# Patient Record
Sex: MALE | Race: WHITE | NOT HISPANIC OR LATINO | Employment: FULL TIME | URBAN - METROPOLITAN AREA
[De-identification: names, ages, dates, MRNs, and addresses within clinical notes are randomized per-mention and may not be internally consistent; named-entity substitution may affect disease eponyms.]

---

## 2017-12-27 ENCOUNTER — APPOINTMENT (EMERGENCY)
Dept: CT IMAGING | Facility: HOSPITAL | Age: 47
End: 2017-12-27
Payer: COMMERCIAL

## 2017-12-27 ENCOUNTER — HOSPITAL ENCOUNTER (EMERGENCY)
Facility: HOSPITAL | Age: 47
Discharge: HOME | End: 2017-12-27
Admitting: EMERGENCY MEDICINE
Payer: COMMERCIAL

## 2017-12-27 VITALS
OXYGEN SATURATION: 99 % | BODY MASS INDEX: 33.34 KG/M2 | WEIGHT: 220 LBS | DIASTOLIC BLOOD PRESSURE: 83 MMHG | HEIGHT: 68 IN | RESPIRATION RATE: 20 BRPM | SYSTOLIC BLOOD PRESSURE: 145 MMHG | TEMPERATURE: 98.5 F | HEART RATE: 89 BPM

## 2017-12-27 DIAGNOSIS — R10.9 RIGHT FLANK PAIN: Primary | ICD-10-CM

## 2017-12-27 LAB
ALBUMIN SERPL BCP-MCNC: 4.6 G/DL (ref 3.5–5)
ALP SERPL-CCNC: 83 U/L (ref 46–116)
ALT SERPL W P-5'-P-CCNC: 54 U/L (ref 12–78)
ANION GAP SERPL CALCULATED.3IONS-SCNC: 8 MMOL/L (ref 4–13)
AST SERPL W P-5'-P-CCNC: 28 U/L (ref 5–45)
BASOPHILS # BLD AUTO: 0.03 THOUSANDS/ΜL (ref 0–0.1)
BASOPHILS NFR BLD AUTO: 1 % (ref 0–1)
BILIRUB SERPL-MCNC: 0.5 MG/DL (ref 0.2–1)
BUN SERPL-MCNC: 9 MG/DL (ref 5–25)
CALCIUM SERPL-MCNC: 8.9 MG/DL (ref 8.3–10.1)
CHLORIDE SERPL-SCNC: 104 MMOL/L (ref 100–108)
CLARITY, POC: CLEAR
CO2 SERPL-SCNC: 27 MMOL/L (ref 21–32)
COLOR, POC: YELLOW
CREAT SERPL-MCNC: 1.04 MG/DL (ref 0.6–1.3)
EOSINOPHIL # BLD AUTO: 0.03 THOUSAND/ΜL (ref 0–0.61)
EOSINOPHIL NFR BLD AUTO: 1 % (ref 0–6)
ERYTHROCYTE [DISTWIDTH] IN BLOOD BY AUTOMATED COUNT: 12.4 % (ref 11.6–15.1)
EXT BILIRUBIN, UA: NORMAL
EXT BLOOD URINE: NORMAL
EXT GLUCOSE, UA: NORMAL
EXT KETONES: NORMAL
EXT NITRITE, UA: NORMAL
EXT PH, UA: 7.5
EXT PROTEIN, UA: NORMAL
EXT SPECIFIC GRAVITY, UA: 1.01
EXT UROBILINOGEN: 0.2
GFR SERPL CREATININE-BSD FRML MDRD: 86 ML/MIN/1.73SQ M
GLUCOSE SERPL-MCNC: 109 MG/DL (ref 65–140)
HCT VFR BLD AUTO: 47.9 % (ref 36.5–49.3)
HGB BLD-MCNC: 16.8 G/DL (ref 12–17)
LYMPHOCYTES # BLD AUTO: 0.88 THOUSANDS/ΜL (ref 0.6–4.47)
LYMPHOCYTES NFR BLD AUTO: 16 % (ref 14–44)
MCH RBC QN AUTO: 31.2 PG (ref 26.8–34.3)
MCHC RBC AUTO-ENTMCNC: 35.1 G/DL (ref 31.4–37.4)
MCV RBC AUTO: 89 FL (ref 82–98)
MONOCYTES # BLD AUTO: 0.41 THOUSAND/ΜL (ref 0.17–1.22)
MONOCYTES NFR BLD AUTO: 8 % (ref 4–12)
NEUTROPHILS # BLD AUTO: 4.06 THOUSANDS/ΜL (ref 1.85–7.62)
NEUTS SEG NFR BLD AUTO: 74 % (ref 43–75)
PLATELET # BLD AUTO: 199 THOUSANDS/UL (ref 149–390)
PMV BLD AUTO: 9.2 FL (ref 8.9–12.7)
POTASSIUM SERPL-SCNC: 4.1 MMOL/L (ref 3.5–5.3)
PROT SERPL-MCNC: 7.5 G/DL (ref 6.4–8.2)
RBC # BLD AUTO: 5.38 MILLION/UL (ref 3.88–5.62)
SODIUM SERPL-SCNC: 139 MMOL/L (ref 136–145)
WBC # BLD AUTO: 5.41 THOUSAND/UL (ref 4.31–10.16)
WBC # BLD EST: NORMAL 10*3/UL

## 2017-12-27 PROCEDURE — 80053 COMPREHEN METABOLIC PANEL: CPT | Performed by: PHYSICIAN ASSISTANT

## 2017-12-27 PROCEDURE — 36415 COLL VENOUS BLD VENIPUNCTURE: CPT | Performed by: PHYSICIAN ASSISTANT

## 2017-12-27 PROCEDURE — 85025 COMPLETE CBC W/AUTO DIFF WBC: CPT | Performed by: PHYSICIAN ASSISTANT

## 2017-12-27 PROCEDURE — 81002 URINALYSIS NONAUTO W/O SCOPE: CPT | Performed by: PHYSICIAN ASSISTANT

## 2017-12-27 PROCEDURE — 96374 THER/PROPH/DIAG INJ IV PUSH: CPT

## 2017-12-27 PROCEDURE — 74176 CT ABD & PELVIS W/O CONTRAST: CPT

## 2017-12-27 PROCEDURE — 99284 EMERGENCY DEPT VISIT MOD MDM: CPT

## 2017-12-27 RX ORDER — CYCLOBENZAPRINE HCL 10 MG
10 TABLET ORAL 3 TIMES DAILY PRN
Qty: 15 TABLET | Refills: 0 | Status: SHIPPED | OUTPATIENT
Start: 2017-12-27 | End: 2019-12-06 | Stop reason: ALTCHOICE

## 2017-12-27 RX ORDER — NAPROXEN 500 MG/1
500 TABLET ORAL 2 TIMES DAILY WITH MEALS
Qty: 20 TABLET | Refills: 0 | Status: SHIPPED | OUTPATIENT
Start: 2017-12-27 | End: 2019-12-06 | Stop reason: ALTCHOICE

## 2017-12-27 RX ORDER — LISINOPRIL 10 MG/1
TABLET ORAL
COMMUNITY
End: 2019-12-06 | Stop reason: ALTCHOICE

## 2017-12-27 RX ORDER — NAPROXEN 375 MG/1
TABLET ORAL
COMMUNITY
End: 2019-12-06 | Stop reason: ALTCHOICE

## 2017-12-27 RX ORDER — KETOROLAC TROMETHAMINE 30 MG/ML
30 INJECTION, SOLUTION INTRAMUSCULAR; INTRAVENOUS ONCE
Status: COMPLETED | OUTPATIENT
Start: 2017-12-27 | End: 2017-12-27

## 2017-12-27 RX ADMIN — KETOROLAC TROMETHAMINE 30 MG: 30 INJECTION, SOLUTION INTRAMUSCULAR at 11:42

## 2017-12-27 NOTE — DISCHARGE INSTRUCTIONS
Rest, ice for next 2 days, heat for 2 days  Take naprosyn twice a day for pain, flexeril 3 times a day for muscle spasm  Follow up with family doctor if no improvement in 5-7 days  Flank Pain   AMBULATORY CARE:   Flank pain  is felt in the area below your ribcage and above your hip bones, often in the lower back  Your pain may be dull or so severe that you cannot get comfortable  The pain may stay in one area or radiate to another area  It may worsen and lighten in waves  Flank pain is often a sign of problems with your urinary tract, such as a kidney stone or infection  Seek care immediately if:   · You have a fever  · Your heart is fluttering or jumping  · You see blood in your urine  · Your pain radiates into your lower abdomen and genital area  · You have intense pain in your low back next to your spine  · You are much more tired than usual and have no desire to eat  · You have a headache and your muscles jerk  Contact your healthcare provider if:   · You have an upset stomach and are vomiting  · You have to urinate more often, and with urgency  · Your pain worsens or does not improve, and you cannot get comfortable  · You pass a stone when you urinate  · You have questions or concerns about your condition or care  Treatment for flank pain  may include medicine to decrease pain or treat a bacterial infection  Follow up with your healthcare provider as directed:  Write down your questions so you remember to ask them during your visits  © 2017 [facility] Information is for End User's use only and may not be sold, redistributed or otherwise used for commercial purposes  All illustrations and images included in CareNotes® are the copyrighted property of [employer]  or [employer] Freeman  The above information is an  only  It is not intended as medical advice for individual conditions or treatments   Talk to your doctor, nurse or pharmacist before following any medical regimen to see if it is safe and effective for you

## 2017-12-27 NOTE — ED PROVIDER NOTES
History  Chief Complaint   Patient presents with    Flank Pain     To ED with c/o right flank pain with radiation to right groin for 2 weeks  Denies any fever or chills  Denies any radiation to pain to right LE  Patient brings to the ED with right back pain and flank pain that radiates around to RLQ  He has been taking aleve for the pain,but it hasn't been helping  He denies any injury to his back  He states lying down makes the pain better, nothing makes it worse  He denies numbness/tingling  He denies urinary symptoms  Patient does have a history of low back pain, but it normally is on the left  Patient states 3 days ago the pain worsened  History provided by:  Patient  Flank Pain   Pain location:  R flank  Pain quality: aching    Pain radiates to:  RLQ  Pain severity:  Moderate  Onset quality:  Gradual  Duration:  2 weeks  Timing:  Constant  Progression:  Worsening  Chronicity:  New  Context: not recent travel, not sick contacts and not trauma    Relieved by:  Lying down  Worsened by:  Nothing  Ineffective treatments:  NSAIDs  Associated symptoms: no chest pain, no chills, no constipation, no diarrhea, no dysuria, no fever, no hematuria, no nausea and no vomiting        Prior to Admission Medications   Prescriptions Last Dose Informant Patient Reported? Taking?   lisinopril (ZESTRIL) 10 mg tablet   Yes No   Sig: Take by mouth   naproxen (NAPROSYN) 375 mg tablet   Yes No   Sig: Take by mouth      Facility-Administered Medications: None       History reviewed  No pertinent past medical history  History reviewed  No pertinent surgical history  History reviewed  No pertinent family history  I have reviewed and agree with the history as documented  Social History   Substance Use Topics    Smoking status: Current Every Day Smoker    Smokeless tobacco: Never Used    Alcohol use Yes      Comment: social        Review of Systems   Constitutional: Negative for chills and fever  Cardiovascular: Negative for chest pain  Gastrointestinal: Negative for constipation, diarrhea, nausea and vomiting  Genitourinary: Positive for flank pain  Negative for difficulty urinating, dysuria, frequency, hematuria and urgency  Musculoskeletal: Positive for back pain  Negative for neck pain  Skin: Negative for color change and rash  Neurological: Negative for dizziness, weakness and numbness  Psychiatric/Behavioral: Negative for confusion  All other systems reviewed and are negative  Physical Exam  ED Triage Vitals [12/27/17 1121]   Temperature Pulse Respirations Blood Pressure SpO2   98 5 °F (36 9 °C) 104 20 (!) 175/105 98 %      Temp Source Heart Rate Source Patient Position - Orthostatic VS BP Location FiO2 (%)   Tympanic Monitor Sitting Right arm --      Pain Score       Worst Possible Pain           Orthostatic Vital Signs  Vitals:    12/27/17 1121 12/27/17 1411   BP: (!) 175/105 145/83   Pulse: 104 89   Patient Position - Orthostatic VS: Sitting        Physical Exam   Constitutional: He is oriented to person, place, and time  He appears well-developed and well-nourished  He is active and cooperative  He does not appear ill  No distress  HENT:   Head: Normocephalic and atraumatic  Right Ear: Hearing normal    Left Ear: Hearing normal    Nose: Nose normal    Mouth/Throat: Oropharynx is clear and moist and mucous membranes are normal    Eyes: Conjunctivae are normal    Neck: Normal range of motion  Cardiovascular: Normal rate, regular rhythm and normal heart sounds  No murmur heard  Pulmonary/Chest: Effort normal and breath sounds normal  He has no wheezes  He has no rhonchi  He has no rales  Abdominal: Soft  Normal appearance and bowel sounds are normal  There is no tenderness  There is no CVA tenderness  Musculoskeletal:        Thoracic back: Normal         Lumbar back: He exhibits normal range of motion, no tenderness, no bony tenderness and no swelling     Negative SLR on the left, Positive on the right  Neurological: He is alert and oriented to person, place, and time  He has normal strength  No cranial nerve deficit or sensory deficit  Skin: Skin is warm and dry  No rash noted  He is not diaphoretic  No pallor  Psychiatric: He has a normal mood and affect  Nursing note and vitals reviewed  ED Medications  Medications   ketorolac (TORADOL) injection 30 mg (30 mg Intravenous Given 12/27/17 1142)       Diagnostic Studies  Results Reviewed     Procedure Component Value Units Date/Time    Comprehensive metabolic panel [[id number]] Collected:  12/27/17 1141    Lab Status:  Final result Specimen:  Blood from Arm, Right Updated:  12/27/17 1209     Sodium 139 mmol/L      Potassium 4 1 mmol/L      Chloride 104 mmol/L      CO2 27 mmol/L      Anion Gap 8 mmol/L      BUN 9 mg/dL      Creatinine 1 04 mg/dL      Glucose 109 mg/dL      Calcium 8 9 mg/dL      AST 28 U/L      ALT 54 U/L      Alkaline Phosphatase 83 U/L      Total Protein 7 5 g/dL      Albumin 4 6 g/dL      Total Bilirubin 0 50 mg/dL      eGFR 86 ml/min/1 73sq m     Narrative:         National Kidney Disease Education Program recommendations are as follows:  GFR calculation is accurate only with a steady state creatinine  Chronic Kidney disease less than 60 ml/min/1 73 sq  meters  Kidney failure less than 15 ml/min/1 73 sq  meters      CBC and differential [[id number]]  (Normal) Collected:  12/27/17 1141    Lab Status:  Final result Specimen:  Blood from Arm, Right Updated:  12/27/17 1152     WBC 5 41 Thousand/uL      RBC 5 38 Million/uL      Hemoglobin 16 8 g/dL      Hematocrit 47 9 %      MCV 89 fL      MCH 31 2 pg      MCHC 35 1 g/dL      RDW 12 4 %      MPV 9 2 fL      Platelets 666 Thousands/uL      Neutrophils Relative 74 %      Lymphocytes Relative 16 %      Monocytes Relative 8 %      Eosinophils Relative 1 %      Basophils Relative 1 %      Neutrophils Absolute 4 06 Thousands/µL      Lymphocytes Absolute 0 88 Thousands/µL      Monocytes Absolute 0 41 Thousand/µL      Eosinophils Absolute 0 03 Thousand/µL      Basophils Absolute 0 03 Thousands/µL     POCT urinalysis dipstick [[id number]]  (Normal) Resulted:  12/27/17 1120    Lab Status:  Final result Specimen:  Urine Updated:  12/27/17 1147     Color, UA YELLOW     Clarity, UA CLEAR     EXT Glucose, UA NEG     EXT Bilirubin, UA (Ref: Negative) NEG     EXT Ketones, UA (Ref: Negative) NEG     EXT Spec Grav, UA 1 010     EXT Blood, UA (Ref: Negative) TARCE     EXT pH, UA 7 5     EXT Protein, UA (Ref: Negative) NEG     EXT Urobilinogen, UA (Ref: 0 2- 1 0) 0 2     EXT Leukocytes, UA (Ref: Negative) NEG     EXT Nitrite, UA (Ref: Negative) NEG                 CT renal stone study abdomen pelvis without contrast   Final Result by Lois Duran MD (12/27 0454)      No acute inflammatory changes within the abdomen or pelvis  Fatty infiltration of the partially imaged liver  No obstructive uropathy or appendicitis in this patient with right flank pain  Small fat-containing right inguinal hernia without apparent complications  Workstation performed: [id number]                    Procedures  Procedures       Phone Contacts  ED Phone Contact    ED Course  ED Course                                MDM  Number of Diagnoses or Management Options  Right flank pain: new and requires workup  Diagnosis management comments: Patient with right flank pain, will order CT to r/o stone           Amount and/or Complexity of Data Reviewed  Clinical lab tests: ordered and reviewed  Tests in the radiology section of CPT®: ordered and reviewed    Patient Progress  Patient progress: stable    CritCare Time    Disposition  Final diagnoses:   Right flank pain     Time reflects when diagnosis was documented in both MDM as applicable and the Disposition within this note     Time User Action Codes Description Comment    12/27/2017  1:59 PM Faiza Wellington Add [R10 9] Right flank pain       ED Disposition     ED Disposition Condition Comment    Discharge  Kerri Rodriguez discharge to home/self care  Condition at discharge: Stable        Follow-up Information     Follow up With Specialties Details Why Contact Info    Christella Skiff  In 1 week As needed, For recheck [address]  [address] [facility]  Shawn          Discharge Medication List as of 12/27/2017  2:01 PM      START taking these medications    Details   cyclobenzaprine (FLEXERIL) 10 mg tablet Take 1 tablet by mouth 3 (three) times a day as needed for muscle spasms, Starting Wed 12/27/2017, Print      !! naproxen (NAPROSYN) 500 mg tablet Take 1 tablet by mouth 2 (two) times a day with meals, Starting Wed 12/27/2017, Print       !! - Potential duplicate medications found  Please discuss with provider  CONTINUE these medications which have NOT CHANGED    Details   lisinopril (ZESTRIL) 10 mg tablet Take by mouth, Historical Med      !! naproxen (NAPROSYN) 375 mg tablet Take by mouth, Historical Med       !! - Potential duplicate medications found  Please discuss with provider  No discharge procedures on file      ED Provider  Electronically Signed by           Gilberto Beavers PA-C  12/27/17 2950

## 2019-12-06 ENCOUNTER — OFFICE VISIT (OUTPATIENT)
Dept: FAMILY MEDICINE CLINIC | Facility: CLINIC | Age: 49
End: 2019-12-06
Payer: COMMERCIAL

## 2019-12-06 VITALS
BODY MASS INDEX: 39.96 KG/M2 | RESPIRATION RATE: 16 BRPM | WEIGHT: 269.8 LBS | HEART RATE: 84 BPM | DIASTOLIC BLOOD PRESSURE: 100 MMHG | HEIGHT: 69 IN | SYSTOLIC BLOOD PRESSURE: 168 MMHG

## 2019-12-06 DIAGNOSIS — R53.83 FATIGUE, UNSPECIFIED TYPE: ICD-10-CM

## 2019-12-06 DIAGNOSIS — E66.01 MORBID OBESITY WITH BMI OF 40.0-44.9, ADULT (HCC): ICD-10-CM

## 2019-12-06 DIAGNOSIS — I10 ESSENTIAL HYPERTENSION: Primary | ICD-10-CM

## 2019-12-06 DIAGNOSIS — R51.9 NONINTRACTABLE HEADACHE, UNSPECIFIED CHRONICITY PATTERN, UNSPECIFIED HEADACHE TYPE: ICD-10-CM

## 2019-12-06 DIAGNOSIS — F17.200 SMOKING: ICD-10-CM

## 2019-12-06 PROCEDURE — 99203 OFFICE O/P NEW LOW 30 MIN: CPT | Performed by: PHYSICIAN ASSISTANT

## 2019-12-06 PROCEDURE — 93000 ELECTROCARDIOGRAM COMPLETE: CPT | Performed by: PHYSICIAN ASSISTANT

## 2019-12-06 RX ORDER — LISINOPRIL 10 MG/1
10 TABLET ORAL DAILY
Qty: 90 TABLET | Refills: 0 | Status: SHIPPED | OUTPATIENT
Start: 2019-12-06 | End: 2020-02-25

## 2019-12-06 NOTE — PROGRESS NOTES
Assessment/Plan:    1  Essential hypertension    - start Lisinopril once daily, stops NSAIDS, watch diet cut back on salt to less than 2 gr a day,get out and walk 4 x a week 30  Minutes, quit smoking, continue with good sleep 8 hours a night repeat BP in 1 week  - POCT ECG  - Lipid panel; Future  - Comprehensive metabolic panel; Future  - CBC and differential; Future  - TSH, 3rd generation with Free T4 reflex; Future  - UA (URINE) with reflex to Scope  - lisinopril (ZESTRIL) 10 mg tablet; Take 1 tablet (10 mg total) by mouth daily  Dispense: 90 tablet; Refill: 0    2  headache     - normal exam, due to #1, if headache gets worse must go to ER, patient agreeable    3  Morbid obesity    - encouraged patient to work on [employer], exercise  and adequate sleep for weight loss  Follow up if more help is needed    4  Smoking    - quit    F/u as needed  F/u 1 week, Tdap at visit    Subjective:   Chief Complaint   Patient presents with    Establish Care    Headache     Times 3 weeks     Dizziness     while coughing       Patient ID: Bj Hunter is a 54 y o  male  Past three weeks patient has noted a frontal headache  Resolves with sleep, but goes up as that day goes on  As soon as he showers and drinks coffee then gets to work the headache returns  Getting about 8 hours sleep  Taking Advil for headache  Patient gets dizzy only with coughing  Headache worse with bending over  Denies change in vision, memory  Has been checking BP at home 168/100  BP at home a little lower but still high diastolic in 23Q  Systolic consistenly in 225  Eats poorly, a lot of packaged frozen  Minimal vegetable and fruit  Denies exercise  Drinks water all day  Smoker 1/2 pack per day  Does not know parents      Was on lisinopril in the past but stopped because BP was normal       The following portions of the patient's history were reviewed and updated as appropriate: allergies, current medications, past family history, past medical history, past social history, past surgical history and problem list     History reviewed  No pertinent past medical history  Past Surgical History:   Procedure Laterality Date    CARPAL TUNNEL RELEASE Left      Family History   Problem Relation Age of Onset    Cancer Maternal Grandmother     Cancer Maternal Grandfather      Social History     Socioeconomic History    Marital status: Single     Spouse name: Not on file    Number of children: Not on file    Years of education: Not on file    Highest education level: Not on file   Occupational History    Not on file   Social Needs    Financial resource strain: Not on file    Food insecurity:     Worry: Not on file     Inability: Not on file    Transportation needs:     Medical: Not on file     Non-medical: Not on file   Tobacco Use    Smoking status: Current Every Day Smoker     Packs/day: 0 50    Smokeless tobacco: Never Used   Substance and Sexual Activity    Alcohol use: Yes     Comment: Everyday     Drug use: No    Sexual activity: Not on file   Lifestyle    Physical activity:     Days per week: Not on file     Minutes per session: Not on file    Stress: Not on file   Relationships    Social connections:     Talks on phone: Not on file     Gets together: Not on file     Attends [employer] service: Not on file     Active member of club or organization: Not on file     Attends meetings of clubs or organizations: Not on file     Relationship status: Not on file    Intimate partner violence:     Fear of current or ex partner: Not on file     Emotionally abused: Not on file     Physically abused: Not on file     Forced sexual activity: Not on file   Other Topics Concern    Not on file   Social History Narrative    Not on file     No current outpatient medications on file  Review of Systems   Constitutional: Negative  Eyes: Negative  Respiratory: Negative  Cardiovascular: Negative      Neurological: Positive for headaches  Objective:    Vitals:    12/06/19 1207   BP: 168/100   BP Location: Left arm   Patient Position: Sitting   Cuff Size: Adult   Pulse: 84   Resp: 16   Weight: 122 kg (269 lb 12 8 oz)   Height: 5' 8 5" (1 74 m)     BP L arm: 172/94  BP R arm: 168/90     Physical Exam   Constitutional: He is oriented to person, place, and time  He appears well-developed and well-nourished  Neck: Neck supple  Cardiovascular: Normal rate, regular rhythm, normal heart sounds and intact distal pulses  Pulmonary/Chest: Effort normal and breath sounds normal    Musculoskeletal: He exhibits no edema  Neurological: He is alert and oriented to person, place, and time  Skin: Skin is warm  Psychiatric: He has a normal mood and affect  BMI Counseling: Body mass index is 40 43 kg/m²  The BMI is above normal  Nutrition recommendations include reducing portion sizes, decreasing overall calorie intake and 3-5 servings of fruits/vegetables daily  Exercise recommendations include moderate aerobic physical activity for 150 minutes/week

## 2019-12-10 LAB
BASOPHILS # BLD AUTO: 0 X10E3/UL (ref 0–0.2)
BASOPHILS NFR BLD AUTO: 0 %
CHOLEST SERPL-MCNC: 201 MG/DL (ref 100–199)
EOSINOPHIL # BLD AUTO: 0.1 X10E3/UL (ref 0–0.4)
EOSINOPHIL NFR BLD AUTO: 3 %
ERYTHROCYTE [DISTWIDTH] IN BLOOD BY AUTOMATED COUNT: 13.6 % (ref 12.3–15.4)
HCT VFR BLD AUTO: 48.4 % (ref 37.5–51)
HDLC SERPL-MCNC: 37 MG/DL
HGB BLD-MCNC: 16.8 G/DL (ref 13–17.7)
IMM GRANULOCYTES # BLD: 0 X10E3/UL (ref 0–0.1)
IMM GRANULOCYTES NFR BLD: 0 %
LDLC SERPL CALC-MCNC: 109 MG/DL (ref 0–99)
LYMPHOCYTES # BLD AUTO: 1.2 X10E3/UL (ref 0.7–3.1)
LYMPHOCYTES NFR BLD AUTO: 22 %
MCH RBC QN AUTO: 32.3 PG (ref 26.6–33)
MCHC RBC AUTO-ENTMCNC: 34.7 G/DL (ref 31.5–35.7)
MCV RBC AUTO: 93 FL (ref 79–97)
MONOCYTES # BLD AUTO: 0.8 X10E3/UL (ref 0.1–0.9)
MONOCYTES NFR BLD AUTO: 15 %
NEUTROPHILS # BLD AUTO: 3.2 X10E3/UL (ref 1.4–7)
NEUTROPHILS NFR BLD AUTO: 60 %
PLATELET # BLD AUTO: 187 X10E3/UL (ref 150–450)
RBC # BLD AUTO: 5.2 X10E6/UL (ref 4.14–5.8)
SL AMB VLDL CHOLESTEROL CALC: 55 MG/DL (ref 5–40)
TRIGL SERPL-MCNC: 277 MG/DL (ref 0–149)
TSH SERPL DL<=0.005 MIU/L-ACNC: 2.26 UIU/ML (ref 0.45–4.5)
WBC # BLD AUTO: 5.3 X10E3/UL (ref 3.4–10.8)

## 2019-12-16 ENCOUNTER — OFFICE VISIT (OUTPATIENT)
Dept: FAMILY MEDICINE CLINIC | Facility: CLINIC | Age: 49
End: 2019-12-16
Payer: COMMERCIAL

## 2019-12-16 VITALS
HEART RATE: 84 BPM | WEIGHT: 265.2 LBS | RESPIRATION RATE: 16 BRPM | SYSTOLIC BLOOD PRESSURE: 130 MMHG | BODY MASS INDEX: 40.19 KG/M2 | DIASTOLIC BLOOD PRESSURE: 82 MMHG | HEIGHT: 68 IN

## 2019-12-16 DIAGNOSIS — E66.01 MORBID OBESITY WITH BMI OF 40.0-44.9, ADULT (HCC): ICD-10-CM

## 2019-12-16 DIAGNOSIS — F17.200 SMOKING: ICD-10-CM

## 2019-12-16 DIAGNOSIS — I10 ESSENTIAL HYPERTENSION: Primary | ICD-10-CM

## 2019-12-16 PROCEDURE — 3079F DIAST BP 80-89 MM HG: CPT | Performed by: PHYSICIAN ASSISTANT

## 2019-12-16 PROCEDURE — 99213 OFFICE O/P EST LOW 20 MIN: CPT | Performed by: PHYSICIAN ASSISTANT

## 2019-12-16 PROCEDURE — 3075F SYST BP GE 130 - 139MM HG: CPT | Performed by: PHYSICIAN ASSISTANT

## 2019-12-16 PROCEDURE — 3008F BODY MASS INDEX DOCD: CPT | Performed by: PHYSICIAN ASSISTANT

## 2019-12-16 NOTE — PROGRESS NOTES
Assessment/Plan:    1  Essential hypertension    - significant improvement with diet and lisinopril  Continue Lisinopril daily along with diet, work on quitting smoking and starting exercise  Recheck in 2-3 months  Continue with HBP monitoring    2  Smoking    - continue to work on quitting    3  Morbid obesity with BMI of 40 0-44 9, adult ([facility] [address] )  - encouraged patient to work on [employer], exercise  and adequate sleep for weight loss  Follow up if more help is neede    F/u as needed    Subjective:   Chief Complaint   Patient presents with    Hypertension      Patient ID: Aldo Magaña is a 54 y o  male  Patient here for follow up regarding BP  Patients headache completely gone  Threw out all packaged food,  Eating fruits, veggies and drinking more water  Has not started to work on quitting smoking  No complaints regarding lisinopril, denies side effects  Checking BP at home getting 130/80s  The following portions of the patient's history were reviewed and updated as appropriate: allergies, current medications, past family history, past medical history, past social history, past surgical history and problem list     No past medical history on file    Past Surgical History:   Procedure Laterality Date    CARPAL TUNNEL RELEASE Left      Family History   Problem Relation Age of Onset    No Known Problems Mother     No Known Problems Father     Cancer Maternal Grandmother     Cancer Maternal Grandfather     Substance Abuse Brother     Substance Abuse Brother     Alcohol abuse Neg Hx     Mental illness Neg Hx      Social History     Socioeconomic History    Marital status: Single     Spouse name: Not on file    Number of children: Not on file    Years of education: Not on file    Highest education level: Not on file   Occupational History    Not on file   Social Needs    Financial resource strain: Not on file    Food insecurity:     Worry: Not on file     Inability: Not on file   Aditya Hoyt Transportation needs:     Medical: Not on file     Non-medical: Not on file   Tobacco Use    Smoking status: Current Every Day Smoker     Packs/day: 0 50    Smokeless tobacco: Never Used   Substance and Sexual Activity    Alcohol use: Yes     Comment: Everyday     Drug use: No    Sexual activity: Not on file   Lifestyle    Physical activity:     Days per week: Not on file     Minutes per session: Not on file    Stress: Not on file   Relationships    Social connections:     Talks on phone: Not on file     Gets together: Not on file     Attends [employer] service: Not on file     Active member of club or organization: Not on file     Attends meetings of clubs or organizations: Not on file     Relationship status: Not on file    Intimate partner violence:     Fear of current or ex partner: Not on file     Emotionally abused: Not on file     Physically abused: Not on file     Forced sexual activity: Not on file   Other Topics Concern    Not on file   Social History Narrative    Not on file       Current Outpatient Medications:     lisinopril (ZESTRIL) 10 mg tablet, Take 1 tablet (10 mg total) by mouth daily, Disp: 90 tablet, Rfl: 0    Review of Systems   Constitutional: Negative  Eyes: Negative  Respiratory: Negative  Cardiovascular: Negative  Neurological: Negative  Objective:    Vitals:    12/16/19 0934   BP: 130/70   BP Location: Left arm   Patient Position: Sitting   Cuff Size: Large   Pulse: 84   Resp: 16   Weight: 120 kg (265 lb 3 2 oz)   Height: 5' 8 25" (1 734 m)     BP Readings from Last 3 Encounters:   12/16/19 130/82   12/06/19 168/100   12/27/17 145/83        Physical Exam   Constitutional: He is oriented to person, place, and time  He appears well-developed and well-nourished  Neck: Neck supple  Cardiovascular: Normal rate, regular rhythm, normal heart sounds and intact distal pulses     Pulmonary/Chest: Effort normal and breath sounds normal    Musculoskeletal: He exhibits no edema  Neurological: He is alert and oriented to person, place, and time  Skin: Skin is warm  Psychiatric: He has a normal mood and affect

## 2020-01-24 ENCOUNTER — TELEPHONE (OUTPATIENT)
Dept: FAMILY MEDICINE CLINIC | Facility: CLINIC | Age: 50
End: 2020-01-24

## 2020-01-24 ENCOUNTER — OFFICE VISIT (OUTPATIENT)
Dept: FAMILY MEDICINE CLINIC | Facility: CLINIC | Age: 50
End: 2020-01-24
Payer: COMMERCIAL

## 2020-01-24 ENCOUNTER — HOSPITAL ENCOUNTER (OUTPATIENT)
Dept: NON INVASIVE DIAGNOSTICS | Facility: HOSPITAL | Age: 50
Discharge: HOME | End: 2020-01-24
Payer: COMMERCIAL

## 2020-01-24 VITALS
HEIGHT: 68 IN | HEART RATE: 70 BPM | WEIGHT: 263.4 LBS | SYSTOLIC BLOOD PRESSURE: 124 MMHG | BODY MASS INDEX: 39.92 KG/M2 | DIASTOLIC BLOOD PRESSURE: 82 MMHG | TEMPERATURE: 98.4 F | RESPIRATION RATE: 18 BRPM

## 2020-01-24 DIAGNOSIS — M79.661 PAIN OF RIGHT CALF: ICD-10-CM

## 2020-01-24 DIAGNOSIS — E66.01 MORBID OBESITY WITH BMI OF 40.0-44.9, ADULT (HCC): ICD-10-CM

## 2020-01-24 DIAGNOSIS — I10 ESSENTIAL HYPERTENSION: ICD-10-CM

## 2020-01-24 DIAGNOSIS — M79.661 PAIN OF RIGHT CALF: Primary | ICD-10-CM

## 2020-01-24 PROCEDURE — 3079F DIAST BP 80-89 MM HG: CPT | Performed by: PHYSICIAN ASSISTANT

## 2020-01-24 PROCEDURE — 3008F BODY MASS INDEX DOCD: CPT | Performed by: PHYSICIAN ASSISTANT

## 2020-01-24 PROCEDURE — 99214 OFFICE O/P EST MOD 30 MIN: CPT | Performed by: PHYSICIAN ASSISTANT

## 2020-01-24 PROCEDURE — 3074F SYST BP LT 130 MM HG: CPT | Performed by: PHYSICIAN ASSISTANT

## 2020-01-24 PROCEDURE — 93971 EXTREMITY STUDY: CPT

## 2020-01-24 NOTE — PROGRESS NOTES
Assessment/Plan:    1  Pain of right calf    - will do STAT Doppler right LE to r/o DV, if positive will start Eliquis 10 mg bid x 7 days then 5 mg bid for a possible 3 months  Will repeat u/s for resolution in 3 months  Consider arterial doppler due to pain increasing with exercise/decrease at rest and history of heavy smoking  - VAS lower limb venous duplex study, unilateral/limited; Future    2  HTN    - excellent control,  C/w Lisinopril 10 mg once daily    3  Morbid obesity    - encouraged patient to work on [employer], exercise  and adequate sleep for weight loss  Follow up if more help is needed    F/u pending results  F/u as needed      Subjective:   Chief Complaint   Patient presents with    Calf pain     right leg       Patient ID: Bull Bradley is a 54 y o  male  Patient with pain in right leg starting a week ago, in calf region  No pain at rest, only with activity  Gets worse with activity, then stops better  Takes an hour lunch at work no problem  As soon as he gets up to walk to locker it is back  Walking on heel makes it better  Denies travel, trauma  Went to bed one night and woke up the next morning and had calf pain with waking  The following portions of the patient's history were reviewed and updated as appropriate: allergies, current medications, past family history, past medical history, past social history, past surgical history and problem list     History reviewed  No pertinent past medical history    Past Surgical History:   Procedure Laterality Date    CARPAL TUNNEL RELEASE Left      Family History   Problem Relation Age of Onset    No Known Problems Mother     No Known Problems Father     Cancer Maternal Grandmother     Cancer Maternal Grandfather     Substance Abuse Brother     Substance Abuse Brother     Alcohol abuse Neg Hx     Mental illness Neg Hx      Social History     Socioeconomic History    Marital status: Single     Spouse name: Not on file    Number of children: Not on file    Years of education: Not on file    Highest education level: Not on file   Occupational History    Not on file   Social Needs    Financial resource strain: Not on file    Food insecurity:     Worry: Not on file     Inability: Not on file    Transportation needs:     Medical: Not on file     Non-medical: Not on file   Tobacco Use    Smoking status: Current Every Day Smoker     Packs/day: 0 50    Smokeless tobacco: Never Used   Substance and Sexual Activity    Alcohol use: Yes     Comment: Everyday     Drug use: No    Sexual activity: Not on file   Lifestyle    Physical activity:     Days per week: Not on file     Minutes per session: Not on file    Stress: Not on file   Relationships    Social connections:     Talks on phone: Not on file     Gets together: Not on file     Attends Christian service: Not on file     Active member of club or organization: Not on file     Attends meetings of clubs or organizations: Not on file     Relationship status: Not on file    Intimate partner violence:     Fear of current or ex partner: Not on file     Emotionally abused: Not on file     Physically abused: Not on file     Forced sexual activity: Not on file   Other Topics Concern    Not on file   Social History Narrative    Not on file       Current Outpatient Medications:     lisinopril (ZESTRIL) 10 mg tablet, Take 1 tablet (10 mg total) by mouth daily, Disp: 90 tablet, Rfl: 0    Review of Systems          Objective:    Vitals:    01/24/20 1419   BP: 124/82   BP Location: Left arm   Patient Position: Sitting   Cuff Size: Large   Pulse: 70   Resp: 18   Temp: 98 4 °F (36 9 °C)   TempSrc: Oral   Weight: 119 kg (263 lb 6 4 oz)   Height: 5' 8" (1 727 m)        Physical Exam   Constitutional: He is oriented to person, place, and time  He appears well-developed and well-nourished  Neck: Neck supple  Cardiovascular: Normal rate, regular rhythm, normal heart sounds and intact distal pulses  Pulmonary/Chest: Effort normal and breath sounds normal    Musculoskeletal: He exhibits no edema  Right calf tender over medial gastrocnemius muscle, positive Hohmann, no swelling  Pulses +2, skin with normal color, appearance   Neurological: He is alert and oriented to person, place, and time  Skin: Skin is warm  Psychiatric: He has a normal mood and affect  BMI Counseling: Body mass index is 40 05 kg/m²  The BMI is above normal  Nutrition recommendations include reducing portion sizes and decreasing overall calorie intake  Exercise recommendations include moderate aerobic physical activity for 150 minutes/week

## 2020-01-24 NOTE — TELEPHONE ENCOUNTER
Per Lizzette Roque :     I called pt and told him there is no clot, he should rest all weekend, and put heat on his calf  If the symptoms get worse then he should go to the ER       I Left a MALENA

## 2020-01-25 PROCEDURE — 93971 EXTREMITY STUDY: CPT | Performed by: SURGERY

## 2020-01-27 ENCOUNTER — TELEPHONE (OUTPATIENT)
Dept: FAMILY MEDICINE CLINIC | Facility: CLINIC | Age: 50
End: 2020-01-27

## 2020-01-27 NOTE — TELEPHONE ENCOUNTER
----- Message from Monica Harris PA-C sent at 1/27/2020 11:10 AM EST -----  Please call patient and see how his leg pain is, he was notified already on Friday that there was no clot  Left detailed message

## 2020-02-25 DIAGNOSIS — I10 ESSENTIAL HYPERTENSION: ICD-10-CM

## 2020-02-25 RX ORDER — LISINOPRIL 10 MG/1
TABLET ORAL
Qty: 30 TABLET | Refills: 2 | Status: SHIPPED | OUTPATIENT
Start: 2020-02-25 | End: 2020-05-24

## 2020-03-17 ENCOUNTER — OFFICE VISIT (OUTPATIENT)
Dept: FAMILY MEDICINE CLINIC | Facility: CLINIC | Age: 50
End: 2020-03-17
Payer: COMMERCIAL

## 2020-03-17 VITALS
DIASTOLIC BLOOD PRESSURE: 76 MMHG | BODY MASS INDEX: 39.54 KG/M2 | HEART RATE: 80 BPM | HEIGHT: 68 IN | RESPIRATION RATE: 16 BRPM | SYSTOLIC BLOOD PRESSURE: 122 MMHG | WEIGHT: 260.9 LBS

## 2020-03-17 DIAGNOSIS — E66.01 CLASS 2 SEVERE OBESITY DUE TO EXCESS CALORIES WITH SERIOUS COMORBIDITY AND BODY MASS INDEX (BMI) OF 39.0 TO 39.9 IN ADULT (HCC): ICD-10-CM

## 2020-03-17 DIAGNOSIS — I10 ESSENTIAL HYPERTENSION: Primary | ICD-10-CM

## 2020-03-17 DIAGNOSIS — M62.838 NECK MUSCLE SPASM: ICD-10-CM

## 2020-03-17 PROBLEM — E66.812 CLASS 2 SEVERE OBESITY DUE TO EXCESS CALORIES WITH SERIOUS COMORBIDITY IN ADULT (HCC): Status: ACTIVE | Noted: 2020-03-17

## 2020-03-17 PROCEDURE — 3078F DIAST BP <80 MM HG: CPT | Performed by: PHYSICIAN ASSISTANT

## 2020-03-17 PROCEDURE — 3008F BODY MASS INDEX DOCD: CPT | Performed by: PHYSICIAN ASSISTANT

## 2020-03-17 PROCEDURE — 99214 OFFICE O/P EST MOD 30 MIN: CPT | Performed by: PHYSICIAN ASSISTANT

## 2020-03-17 PROCEDURE — 3074F SYST BP LT 130 MM HG: CPT | Performed by: PHYSICIAN ASSISTANT

## 2020-03-17 RX ORDER — CYCLOBENZAPRINE HCL 10 MG
10 TABLET ORAL
Qty: 20 TABLET | Refills: 0 | Status: SHIPPED | OUTPATIENT
Start: 2020-03-17 | End: 2020-04-02

## 2020-03-17 NOTE — PROGRESS NOTES
Assessment/Plan:    1  Essential hypertension    - excellent control, c/w diet, weight loss and lisinopril 10 mg once daily  Continue to work on quitting smoking    2  Neck muscle spasm    - heat and stretching discussed with patient, try Flexeril at night before bed, if no improvement can refer to PT or chiro, patient not interested at this time  - cyclobenzaprine (FLEXERIL) 10 mg tablet; Take 1 tablet (10 mg total) by mouth daily at bedtime as needed for muscle spasms  Dispense: 20 tablet; Refill: 0    3  Class 2 severe obesity due to excess calories with serious comorbidity and body mass index (BMI) of 39 0 to 39 9 in [facility])    - improving! Keep up the good work    F/u 6 months or sooner if needed        Subjective:   Chief Complaint   Patient presents with    Hypertension      Patient ID: Fam Arana is a 54 y o  male  Patient here for BP check  Also with a pain in neck starts at base of skull and goes into back  Has been going on for 2 weeks  Taking Aleve in morning with no relief  Only comfortable with laying on left side  Denies no radiation  No weakness  The following portions of the patient's history were reviewed and updated as appropriate: allergies, current medications, past family history, past medical history, past social history, past surgical history and problem list     History reviewed  No pertinent past medical history    Past Surgical History:   Procedure Laterality Date    CARPAL TUNNEL RELEASE Left      Family History   Problem Relation Age of Onset    No Known Problems Mother     No Known Problems Father     Cancer Maternal Grandmother     Cancer Maternal Grandfather     Substance Abuse Brother     Substance Abuse Brother     Alcohol abuse Neg Hx     Mental illness Neg Hx      Social History     Socioeconomic History    Marital status: Single     Spouse name: Not on file    Number of children: Not on file    Years of education: Not on file    Highest education level: Not on file   Occupational History    Not on file   Social Needs    Financial resource strain: Not on file    Food insecurity:     Worry: Not on file     Inability: Not on file    Transportation needs:     Medical: Not on file     Non-medical: Not on file   Tobacco Use    Smoking status: Current Every Day Smoker     Packs/day: 0 50    Smokeless tobacco: Never Used   Substance and Sexual Activity    Alcohol use: Yes     Comment: Everyday     Drug use: No    Sexual activity: Yes     Partners: Female   Lifestyle    Physical activity:     Days per week: Not on file     Minutes per session: Not on file    Stress: Not on file   Relationships    Social connections:     Talks on phone: Not on file     Gets together: Not on file     Attends Yazidism service: Not on file     Active member of club or organization: Not on file     Attends meetings of clubs or organizations: Not on file     Relationship status: Not on file    Intimate partner violence:     Fear of current or ex partner: Not on file     Emotionally abused: Not on file     Physically abused: Not on file     Forced sexual activity: Not on file   Other Topics Concern    Not on file   Social History Narrative    Not on file       Current Outpatient Medications:     lisinopril (ZESTRIL) 10 mg tablet, TAKE 1 TABLET BY MOUTH EVERY DAY, Disp: 30 tablet, Rfl: 2    Review of Systems          Objective:    Vitals:    03/17/20 1656   BP: 124/70   BP Location: Left arm   Patient Position: Sitting   Cuff Size: Large   Pulse: 80   Resp: 16   Weight: 118 kg (260 lb 14 4 oz)   Height: 5' 8" (1 727 m)     BP Readings from Last 3 Encounters:   03/17/20 122/76   01/24/20 124/82   12/16/19 130/82      Physical Exam   Constitutional: He is oriented to person, place, and time  He appears well-developed and well-nourished  Neck: Neck supple  Cardiovascular: Normal rate, regular rhythm, normal heart sounds and intact distal pulses     Pulmonary/Chest: Effort normal and breath sounds normal    Musculoskeletal: He exhibits no edema  c-spine not tender, right cervical spinal muscles tight with spasm, full ROM with some discomfort   Neurological: He is alert and oriented to person, place, and time  Skin: Skin is warm  Psychiatric: He has a normal mood and affect  BMI Counseling: Body mass index is 39 67 kg/m²  The BMI is above normal  Nutrition recommendations include reducing portion sizes and decreasing overall calorie intake  Exercise recommendations include vigorous aerobic physical activity for 75 minutes/week

## 2020-03-26 ENCOUNTER — TELEPHONE (OUTPATIENT)
Dept: FAMILY MEDICINE CLINIC | Facility: CLINIC | Age: 50
End: 2020-03-26

## 2020-03-26 ENCOUNTER — TELEMEDICINE (OUTPATIENT)
Dept: FAMILY MEDICINE CLINIC | Facility: CLINIC | Age: 50
End: 2020-03-26
Payer: COMMERCIAL

## 2020-03-26 VITALS — DIASTOLIC BLOOD PRESSURE: 64 MMHG | TEMPERATURE: 97.7 F | HEART RATE: 82 BPM | SYSTOLIC BLOOD PRESSURE: 122 MMHG

## 2020-03-26 DIAGNOSIS — I10 ESSENTIAL HYPERTENSION: ICD-10-CM

## 2020-03-26 DIAGNOSIS — J06.9 VIRAL URI: Primary | ICD-10-CM

## 2020-03-26 PROCEDURE — 99213 OFFICE O/P EST LOW 20 MIN: CPT | Performed by: PHYSICIAN ASSISTANT

## 2020-03-26 NOTE — PROGRESS NOTES
Virtual Regular Visit       Reason for visit is congestion    Encounter provider Martha Ledezma PA-C    Provider located at [address] [place] [address] [id number] [facility]  [phone]      Recent Visits  No visits were found meeting these conditions  Showing recent visits within past 7 days and meeting all other requirements     Today's Visits  Date Type Provider Dept   03/26/20 Telemedicine Martha Ledezma PA-C [address] [facility]   Showing today's visits and meeting all other requirements     Future Appointments  Date Type Provider Dept   03/26/20 Telemedicine Martha Ledezma PA-C [address] [facility]   Showing future appointments within next 150 days and meeting all other requirements        After connecting through [employer], the patient was identified by name and date of birth  Fam Arana was informed that this is a telemedicine visit and that the visit is being conducted through [employer] and patient was informed that this is not a secure, HIPAA-complaint platform  he agrees to proceed  which may not be secure and therefore, might not be HIPAA-compliant  My office door was closed  No one else was in the room  He acknowledged consent and understanding of privacy and security of the video platform  The patient has agreed to participate and understands they can discontinue the visit at any time  Lucía Lind is a 54 y o  male complaining of starting with slight sore throat with waking this morning, "feels like post nasal drip" and some nasal congestion, clear rhinorrhea  Denies cough, ear pain, fever, chills, sob, wheeze, body aches, chills, feeling sick  Tried no OTC  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  They do business with [employer] and have been getting nonstop [place] from [place]   The warehouse is taking proper precautions, no one is sick at work, they are taking daily temps  History reviewed  No pertinent past medical history  Past Surgical History:   Procedure Laterality Date    CARPAL TUNNEL RELEASE Left        Current Outpatient Medications   Medication Sig Dispense Refill    cyclobenzaprine (FLEXERIL) 10 mg tablet Take 1 tablet (10 mg total) by mouth daily at bedtime as needed for muscle spasms 20 tablet 0    lisinopril (ZESTRIL) 10 mg tablet TAKE 1 TABLET BY MOUTH EVERY DAY 30 tablet 2     No current facility-administered medications for this visit  No Known Allergies    Review of Systems   Constitutional: Negative  HENT: Positive for congestion, postnasal drip, rhinorrhea and sore throat  Negative for sinus pressure and sinus pain  Respiratory: Negative  Cardiovascular: Negative  Gastrointestinal: Negative  Genitourinary: Negative  Neurological: Negative  Psychiatric/Behavioral: Negative  Vitals:    03/26/20 1227   BP: 122/64   Pulse: 82   Temp: 97 7 °F (36 5 °C)         Physical Exam   Constitutional: He is oriented to person, place, and time  He appears well-developed and well-nourished  Neurological: He is alert and oriented to person, place, and time  Psychiatric: He has a normal mood and affect  Judgment normal       Assessment/Plan:    1  Viral URI - fluids, rest, reassurance, continue to monitor temp, take Tylenol as needed  If fever >100 F and cough/SOB please call    2  HTN - well controlled on Lisinopril, continue as is    Follow up as needed  Follow up as scheduled    I spent 15 minutes with the patient during this visit

## 2020-03-26 NOTE — LETTER
March 26, 2020     Patient: Law Dotson   YOB: 1970   Date of Visit: 3/26/2020       To Whom it May Concern:    Mindy Lora is under my professional care  He was seen via a televisit on 3/26/2020  He was told to stay home from work from 3/26 to return 3/30/2020 assuming he is feeling well  He will be calling on Monday for evaluation to be cleared to return on 3/30/2020  If you have any questions or concerns, please don't hesitate to call           Sincerely,          Debbie Green PA-C        CC: No Recipients

## 2020-03-26 NOTE — TELEPHONE ENCOUNTER
Willene Denver is requesting the note for work be faxed to his employer, [employer] at [phone] (fax)

## 2020-04-02 DIAGNOSIS — M62.838 NECK MUSCLE SPASM: ICD-10-CM

## 2020-04-02 RX ORDER — CYCLOBENZAPRINE HCL 10 MG
10 TABLET ORAL
Qty: 20 TABLET | Refills: 0 | Status: SHIPPED | OUTPATIENT
Start: 2020-04-02 | End: 2020-07-17

## 2020-05-24 DIAGNOSIS — I10 ESSENTIAL HYPERTENSION: ICD-10-CM

## 2020-05-24 RX ORDER — LISINOPRIL 10 MG/1
TABLET ORAL
Qty: 30 TABLET | Refills: 2 | Status: SHIPPED | OUTPATIENT
Start: 2020-05-24 | End: 2020-06-30

## 2020-06-24 ENCOUNTER — OFFICE VISIT (OUTPATIENT)
Dept: FAMILY MEDICINE CLINIC | Facility: CLINIC | Age: 50
End: 2020-06-24
Payer: COMMERCIAL

## 2020-06-24 VITALS
HEART RATE: 100 BPM | SYSTOLIC BLOOD PRESSURE: 132 MMHG | DIASTOLIC BLOOD PRESSURE: 80 MMHG | HEIGHT: 68 IN | TEMPERATURE: 98.9 F | RESPIRATION RATE: 16 BRPM | BODY MASS INDEX: 40.19 KG/M2 | WEIGHT: 265.2 LBS

## 2020-06-24 DIAGNOSIS — R06.81 WITNESSED EPISODE OF APNEA: ICD-10-CM

## 2020-06-24 DIAGNOSIS — G47.19 EXCESSIVE DAYTIME SLEEPINESS: ICD-10-CM

## 2020-06-24 DIAGNOSIS — R51.9 NONINTRACTABLE HEADACHE, UNSPECIFIED CHRONICITY PATTERN, UNSPECIFIED HEADACHE TYPE: Primary | ICD-10-CM

## 2020-06-24 DIAGNOSIS — I10 ESSENTIAL HYPERTENSION: ICD-10-CM

## 2020-06-24 PROCEDURE — 3075F SYST BP GE 130 - 139MM HG: CPT | Performed by: FAMILY MEDICINE

## 2020-06-24 PROCEDURE — 3008F BODY MASS INDEX DOCD: CPT | Performed by: FAMILY MEDICINE

## 2020-06-24 PROCEDURE — 3079F DIAST BP 80-89 MM HG: CPT | Performed by: FAMILY MEDICINE

## 2020-06-24 PROCEDURE — 99214 OFFICE O/P EST MOD 30 MIN: CPT | Performed by: FAMILY MEDICINE

## 2020-06-30 ENCOUNTER — TELEPHONE (OUTPATIENT)
Dept: FAMILY MEDICINE CLINIC | Facility: CLINIC | Age: 50
End: 2020-06-30

## 2020-06-30 ENCOUNTER — OFFICE VISIT (OUTPATIENT)
Dept: FAMILY MEDICINE CLINIC | Facility: CLINIC | Age: 50
End: 2020-06-30
Payer: COMMERCIAL

## 2020-06-30 VITALS
BODY MASS INDEX: 40.48 KG/M2 | DIASTOLIC BLOOD PRESSURE: 70 MMHG | HEIGHT: 68 IN | SYSTOLIC BLOOD PRESSURE: 122 MMHG | HEART RATE: 72 BPM | WEIGHT: 267.1 LBS | RESPIRATION RATE: 16 BRPM

## 2020-06-30 DIAGNOSIS — L25.9 CONTACT DERMATITIS, UNSPECIFIED CONTACT DERMATITIS TYPE, UNSPECIFIED TRIGGER: Primary | ICD-10-CM

## 2020-06-30 DIAGNOSIS — I10 ESSENTIAL HYPERTENSION: Primary | ICD-10-CM

## 2020-06-30 DIAGNOSIS — I10 ESSENTIAL HYPERTENSION: ICD-10-CM

## 2020-06-30 DIAGNOSIS — R55 SYNCOPE, UNSPECIFIED SYNCOPE TYPE: ICD-10-CM

## 2020-06-30 DIAGNOSIS — R51.9 ACUTE NONINTRACTABLE HEADACHE, UNSPECIFIED HEADACHE TYPE: ICD-10-CM

## 2020-06-30 LAB
ALBUMIN SERPL-MCNC: 4.4 G/DL (ref 4–5)
ALBUMIN/GLOB SERPL: 2.1 {RATIO} (ref 1.2–2.2)
ALP SERPL-CCNC: 69 IU/L (ref 39–117)
ALT SERPL-CCNC: 55 IU/L (ref 0–44)
APPEARANCE UR: CLEAR
AST SERPL-CCNC: 29 IU/L (ref 0–40)
B BURGDOR IGG+IGM SER-ACNC: <0.91 ISR (ref 0–0.9)
BACTERIA URNS QL MICRO: NORMAL
BASOPHILS # BLD AUTO: 0 X10E3/UL (ref 0–0.2)
BASOPHILS NFR BLD AUTO: 1 %
BILIRUB SERPL-MCNC: 0.4 MG/DL (ref 0–1.2)
BILIRUB UR QL STRIP: NEGATIVE
BUN SERPL-MCNC: 10 MG/DL (ref 6–24)
BUN/CREAT SERPL: 11 (ref 9–20)
CALCIUM SERPL-MCNC: 8.9 MG/DL (ref 8.7–10.2)
CHLORIDE SERPL-SCNC: 100 MMOL/L (ref 96–106)
CHOLEST SERPL-MCNC: 183 MG/DL (ref 100–199)
CO2 SERPL-SCNC: 26 MMOL/L (ref 20–29)
COLOR UR: YELLOW
CREAT SERPL-MCNC: 0.93 MG/DL (ref 0.76–1.27)
EOSINOPHIL # BLD AUTO: 0.1 X10E3/UL (ref 0–0.4)
EOSINOPHIL NFR BLD AUTO: 2 %
EPI CELLS #/AREA URNS HPF: NORMAL /HPF (ref 0–10)
ERYTHROCYTE [DISTWIDTH] IN BLOOD BY AUTOMATED COUNT: 12.2 % (ref 11.6–15.4)
GLOBULIN SER-MCNC: 2.1 G/DL (ref 1.5–4.5)
GLUCOSE SERPL-MCNC: 117 MG/DL (ref 65–99)
GLUCOSE UR QL: NEGATIVE
HCT VFR BLD AUTO: 45.3 % (ref 37.5–51)
HDLC SERPL-MCNC: 41 MG/DL
HGB BLD-MCNC: 15.4 G/DL (ref 13–17.7)
HGB UR QL STRIP: NEGATIVE
IMM GRANULOCYTES # BLD: 0 X10E3/UL (ref 0–0.1)
IMM GRANULOCYTES NFR BLD: 0 %
KETONES UR QL STRIP: NEGATIVE
LDLC SERPL CALC-MCNC: 119 MG/DL (ref 0–99)
LEUKOCYTE ESTERASE UR QL STRIP: NEGATIVE
LYMPHOCYTES # BLD AUTO: 0.9 X10E3/UL (ref 0.7–3.1)
LYMPHOCYTES NFR BLD AUTO: 19 %
MCH RBC QN AUTO: 31.8 PG (ref 26.6–33)
MCHC RBC AUTO-ENTMCNC: 34 G/DL (ref 31.5–35.7)
MCV RBC AUTO: 94 FL (ref 79–97)
MICRO URNS: ABNORMAL
MICRO URNS: ABNORMAL
MONOCYTES # BLD AUTO: 0.6 X10E3/UL (ref 0.1–0.9)
MONOCYTES NFR BLD AUTO: 12 %
NEUTROPHILS # BLD AUTO: 2.9 X10E3/UL (ref 1.4–7)
NEUTROPHILS NFR BLD AUTO: 66 %
NITRITE UR QL STRIP: NEGATIVE
PH UR STRIP: 8 [PH] (ref 5–7.5)
PLATELET # BLD AUTO: 194 X10E3/UL (ref 150–450)
POTASSIUM SERPL-SCNC: 4.6 MMOL/L (ref 3.5–5.2)
PROT SERPL-MCNC: 6.5 G/DL (ref 6–8.5)
PROT UR QL STRIP: NEGATIVE
RBC # BLD AUTO: 4.84 X10E6/UL (ref 4.14–5.8)
RBC #/AREA URNS HPF: NORMAL /HPF (ref 0–2)
SL AMB EGFR AFRICAN AMERICAN: 113 ML/MIN/1.73
SL AMB EGFR NON AFRICAN AMERICAN: 97 ML/MIN/1.73
SL AMB URINALYSIS REFLEX: ABNORMAL
SL AMB VLDL CHOLESTEROL CALC: 23 MG/DL (ref 5–40)
SODIUM SERPL-SCNC: 139 MMOL/L (ref 134–144)
SP GR UR: 1.01 (ref 1–1.03)
TRIGL SERPL-MCNC: 117 MG/DL (ref 0–149)
TSH SERPL DL<=0.005 MIU/L-ACNC: 2.25 UIU/ML (ref 0.45–4.5)
UROBILINOGEN UR STRIP-ACNC: 0.2 MG/DL (ref 0.2–1)
WBC # BLD AUTO: 4.5 X10E3/UL (ref 3.4–10.8)
WBC #/AREA URNS HPF: NORMAL /HPF (ref 0–5)

## 2020-06-30 PROCEDURE — 3074F SYST BP LT 130 MM HG: CPT | Performed by: PHYSICIAN ASSISTANT

## 2020-06-30 PROCEDURE — 99214 OFFICE O/P EST MOD 30 MIN: CPT | Performed by: PHYSICIAN ASSISTANT

## 2020-06-30 PROCEDURE — 3008F BODY MASS INDEX DOCD: CPT | Performed by: PHYSICIAN ASSISTANT

## 2020-06-30 PROCEDURE — 3078F DIAST BP <80 MM HG: CPT | Performed by: PHYSICIAN ASSISTANT

## 2020-06-30 RX ORDER — MOMETASONE FUROATE 1 MG/G
CREAM TOPICAL DAILY
Qty: 45 G | Refills: 0 | Status: SHIPPED | OUTPATIENT
Start: 2020-06-30 | End: 2020-10-06

## 2020-06-30 RX ORDER — LOSARTAN POTASSIUM 50 MG/1
50 TABLET ORAL DAILY
Qty: 90 TABLET | Refills: 3 | Status: SHIPPED | OUTPATIENT
Start: 2020-06-30 | End: 2021-06-15 | Stop reason: SDUPTHER

## 2020-06-30 RX ORDER — LOSARTAN POTASSIUM 50 MG/1
50 TABLET ORAL DAILY
Qty: 90 TABLET | Refills: 3 | Status: SHIPPED | OUTPATIENT
Start: 2020-06-30 | End: 2020-06-30 | Stop reason: SDUPTHER

## 2020-06-30 NOTE — PROGRESS NOTES
Assessment/Plan:    1  Essential hypertension    - will stop lisinopril 10 mg due to cough, will start Cozaar 50 mg once daily, work on diet, weight loss    2  Syncope, unspecified syncope type    - possible syncope? Will check MRI as patients cough seemed to trigger this almost syncopal episode  May need neuro  If s/s get worse will need to go to ER  - MRI brain w wo contrast; Future    3  Acute nonintractable headache, unspecified headache type    - new onset headache, increased HA with coughing causing almost syncopal episode r/o brain mass, if s/s get worse should go to ER   - MRI brain w wo contrast; Future    F/u pending labs  F/u 1 month for repeat BP    Subjective:   Chief Complaint   Patient presents with    Hypertension      Patient ID: Corinna Crouch is a 52 y o  male  Patient here c/o having a "killer headache"  Was seen last week for the headache  When patient coughs with headache will almost pass out  Coughed at home and headache got so bad brought him down and he bumped his head  Headache constant, taking Aleve with some relief  Started wearing glasses because having trouble seeing but no improvement in headache  Does not wake up with it, gets worse as the day goes on  10:10  Started getting ehadache while on vacation last week then came here  Taking Lisinopril 10 mg once daily and BP looks good  Saw eye doctor 4 months, dilated eye exam and was given new glasses  The following portions of the patient's history were reviewed and updated as appropriate: allergies, current medications, past family history, past medical history, past social history, past surgical history and problem list     History reviewed  No pertinent past medical history    Past Surgical History:   Procedure Laterality Date    CARPAL TUNNEL RELEASE Left      Family History   Problem Relation Age of Onset    No Known Problems Mother     No Known Problems Father     Cancer Maternal Grandmother     Cancer Maternal Grandfather     Substance Abuse Brother     Substance Abuse Brother     Alcohol abuse Neg Hx     Mental illness Neg Hx      Social History     Socioeconomic History    Marital status: Single     Spouse name: Not on file    Number of children: Not on file    Years of education: Not on file    Highest education level: Not on file   Occupational History    Not on file   Social Needs    Financial resource strain: Not on file    Food insecurity:     Worry: Not on file     Inability: Not on file    Transportation needs:     Medical: Not on file     Non-medical: Not on file   Tobacco Use    Smoking status: Current Every Day Smoker     Packs/day: 0 50    Smokeless tobacco: Never Used   Substance and Sexual Activity    Alcohol use: Yes     Comment: Everyday     Drug use: No    Sexual activity: Yes     Partners: Female   Lifestyle    Physical activity:     Days per week: Not on file     Minutes per session: Not on file    Stress: Not on file   Relationships    Social connections:     Talks on phone: Not on file     Gets together: Not on file     Attends Quaker service: Not on file     Active member of club or organization: Not on file     Attends meetings of clubs or organizations: Not on file     Relationship status: Not on file    Intimate partner violence:     Fear of current or ex partner: Not on file     Emotionally abused: Not on file     Physically abused: Not on file     Forced sexual activity: Not on file   Other Topics Concern    Not on file   Social History Narrative    Not on file       Current Outpatient Medications:     cyclobenzaprine (FLEXERIL) 10 mg tablet, TAKE 1 TABLET (10 MG TOTAL) BY MOUTH DAILY AT BEDTIME AS NEEDED FOR MUSCLE SPASMS, Disp: 20 tablet, Rfl: 0    lisinopril (ZESTRIL) 10 mg tablet, TAKE 1 TABLET BY MOUTH EVERY DAY, Disp: 30 tablet, Rfl: 2    Review of Systems          Objective:    Vitals:    06/30/20 1422   BP: 122/70   BP Location: Left arm   Patient Position: Sitting   Cuff Size: Large   Pulse: 72   Resp: 16   Weight: 121 kg (267 lb 1 6 oz)   Height: 5' 8 25" (1 734 m)        Physical Exam   Constitutional: He is oriented to person, place, and time  He appears well-developed and well-nourished  HENT:   Head: Normocephalic and atraumatic  Right Ear: Hearing, tympanic membrane, external ear and ear canal normal    Left Ear: Hearing, tympanic membrane, external ear and ear canal normal    Nose: Nose normal    Mouth/Throat: Oropharynx is clear and moist    Eyes: Pupils are equal, round, and reactive to light  Conjunctivae and EOM are normal    Neck: Normal range of motion  Neck supple  No thyromegaly present  Cardiovascular: Normal rate, regular rhythm, normal heart sounds and intact distal pulses  No murmur heard  Pulmonary/Chest: Effort normal and breath sounds normal  No respiratory distress  He has no wheezes  He has no rales  Musculoskeletal: Normal range of motion  He exhibits no edema  Lymphadenopathy:     He has no cervical adenopathy  Neurological: He is alert and oriented to person, place, and time  He has normal reflexes  He displays normal reflexes  No cranial nerve deficit  Coordination normal    Skin: Skin is warm and dry  Psychiatric: He has a normal mood and affect  Judgment normal          No visits with results within 1 Day(s) from this visit     Latest known visit with results is:   Orders Only on 06/27/2020   Component Date Value Ref Range Status    White Blood Cell Count 06/27/2020 4 5  3 4 - 10 8 x10E3/uL Final    Red Blood Cell Count 06/27/2020 4 84  4 14 - 5 80 x10E6/uL Final    Hemoglobin 06/27/2020 15 4  13 0 - 17 7 g/dL Final    HCT 06/27/2020 45 3  37 5 - 51 0 % Final    MCV 06/27/2020 94  79 - 97 fL Final    MCH 06/27/2020 31 8  26 6 - 33 0 pg Final    MCHC 06/27/2020 34 0  31 5 - 35 7 g/dL Final    RDW 06/27/2020 12 2  11 6 - 15 4 % Final    Platelet Count 75/17/0120 194  150 - 450 x10E3/uL Final    Neutrophils 06/27/2020 66  Not Estab  % Final    Lymphocytes 06/27/2020 19  Not Estab  % Final    Monocytes 06/27/2020 12  Not Estab  % Final    Eosinophils 06/27/2020 2  Not Estab  % Final    Basophils PCT 06/27/2020 1  Not Estab  % Final    Neutrophils (Absolute) 06/27/2020 2 9  1 4 - 7 0 x10E3/uL Final    Lymphocytes (Absolute) 06/27/2020 0 9  0 7 - 3 1 x10E3/uL Final    Monocytes (Absolute) 06/27/2020 0 6  0 1 - 0 9 x10E3/uL Final    Eosinophils (Absolute) 06/27/2020 0 1  0 0 - 0 4 x10E3/uL Final    Basophils ABS 06/27/2020 0 0  0 0 - 0 2 x10E3/uL Final    Immature Granulocytes 06/27/2020 0  Not Estab  % Final    Immature Granulocytes (Absolute) 06/27/2020 0 0  0 0 - 0 1 x10E3/uL Final    Comment: A hand-written panel/profile was received from your office  In  accordance with the [facility] Ambiguous Test Code Policy dated July 5278, we have assigned CBC with Differential/Platelet, Test Code  #178608 to this request  If this is not the testing you wished to  receive on this specimen, please contact the [facility] Department to clarify the test order  We  appreciate your business        Glucose, Random 06/27/2020 117* 65 - 99 mg/dL Final    BUN 06/27/2020 10  6 - 24 mg/dL Final    Creatinine 06/27/2020 0 93  0 76 - 1 27 mg/dL Final    eGFR Non African American 06/27/2020 97  >59 mL/min/1 73 Final    eGFR  06/27/2020 113  >59 mL/min/1 73 Final    SL AMB BUN/CREATININE RATIO 06/27/2020 11  9 - 20 Final    Sodium 06/27/2020 139  134 - 144 mmol/L Final    Potassium 06/27/2020 4 6  3 5 - 5 2 mmol/L Final    Chloride 06/27/2020 100  96 - 106 mmol/L Final    CO2 06/27/2020 26  20 - 29 mmol/L Final    CALCIUM 06/27/2020 8 9  8 7 - 10 2 mg/dL Final    Protein, Total 06/27/2020 6 5  6 0 - 8 5 g/dL Final    Albumin 06/27/2020 4 4  4 0 - 5 0 g/dL Final    Globulin, Total 06/27/2020 2 1  1 5 - 4 5 g/dL Final    Albumin/Globulin Ratio 06/27/2020 2 1  1 2 - 2 2 Final  TOTAL BILIRUBIN 06/27/2020 0 4  0 0 - 1 2 mg/dL Final    Alk Phos Isoenzymes 06/27/2020 69  39 - 117 IU/L Final    AST 06/27/2020 29  0 - 40 IU/L Final    ALT 06/27/2020 55* 0 - 44 IU/L Final    Specific Gravity 06/27/2020 1 007  1 005 - 1 030 Final    Ph 06/27/2020 8 0* 5 0 - 7 5 Final    Color UA 06/27/2020 Yellow  Yellow Final    Urine Appearance 06/27/2020 Clear  Clear Final    Leukocyte Esterase 06/27/2020 Negative  Negative Final    Protein 06/27/2020 Negative  Negative/Trace Final    Glucose, 24 HR Urine 06/27/2020 Negative  Negative Final    Ketone, Urine 06/27/2020 Negative  Negative Final    Blood, Urine 06/27/2020 Negative  Negative Final    Bilirubin, Urine 06/27/2020 Negative  Negative Final    Urobilinogen Urine 06/27/2020 0 2  0 2 - 1 0 mg/dL Final    SL AMB NITRITES URINE, QUAL  06/27/2020 Negative  Negative Final    Microscopic Examination 06/27/2020 Comment   Final    Microscopic follows if indicated   Microscopic Examination 06/27/2020 See below:   Final    Urinalysis Reflex 06/27/2020 Comment   Final    This specimen will not reflex to a Urine Culture      SL AMB WBC, URINE 06/27/2020 0-5  0 - 5 /hpf Final    RBC, Urine 06/27/2020 0-2  0 - 2 /hpf Final    Epithelial Cells (non renal) 06/27/2020 None seen  0 - 10 /hpf Final    Bacteria, Urine 06/27/2020 None seen  None seen/Few Final    Cholesterol, Total 06/27/2020 183  100 - 199 mg/dL Final    Triglycerides 06/27/2020 117  0 - 149 mg/dL Final    HDL 06/27/2020 41  >39 mg/dL Final    VLDL Cholesterol Calculated 06/27/2020 23  5 - 40 mg/dL Final    LDL Calculated 06/27/2020 119* 0 - 99 mg/dL Final    TSH 06/27/2020 2 250  0 450 - 4 500 uIU/mL Final   ]

## 2020-07-08 ENCOUNTER — HOSPITAL ENCOUNTER (OUTPATIENT)
Dept: MRI IMAGING | Facility: HOSPITAL | Age: 50
Discharge: HOME | End: 2020-07-08
Payer: COMMERCIAL

## 2020-07-08 DIAGNOSIS — R51.9 ACUTE NONINTRACTABLE HEADACHE, UNSPECIFIED HEADACHE TYPE: ICD-10-CM

## 2020-07-08 DIAGNOSIS — R55 SYNCOPE, UNSPECIFIED SYNCOPE TYPE: ICD-10-CM

## 2020-07-08 PROCEDURE — A9585 GADOBUTROL INJECTION: HCPCS | Performed by: RADIOLOGY

## 2020-07-08 PROCEDURE — 70553 MRI BRAIN STEM W/O & W/DYE: CPT

## 2020-07-08 RX ADMIN — GADOBUTROL 12 ML: 604.72 INJECTION INTRAVENOUS at 10:45

## 2020-07-14 ENCOUNTER — TELEPHONE (OUTPATIENT)
Dept: FAMILY MEDICINE CLINIC | Facility: CLINIC | Age: 50
End: 2020-07-14

## 2020-07-14 NOTE — TELEPHONE ENCOUNTER
----- Message from Amy Pindea PA-C sent at 7/14/2020  1:00 PM EDT -----  Please call patient, let him know he has a SLIGHT abnormality on his MRI, I would look him to follow up with neuro  Is he still having headache?

## 2020-07-14 NOTE — TELEPHONE ENCOUNTER
Patient called back, he said    He has a bad headache,  He needs an order to see neuro,  And he would like something other then Aleve for the headache because it is not helping him  And    He wants to know what the abnormality is  He would also like us to help him with scheduling      [facility]    [phone]

## 2020-07-14 NOTE — TELEPHONE ENCOUNTER
Patient was informed and given [facility]  And [address] [facility] phone number  He states that he is still having headaches   MRP

## 2020-07-14 NOTE — TELEPHONE ENCOUNTER
Can you call neuro  I asked him to go to the ER for the headache and he is refusing due to the $500 copay  Can you beg for an appointment  If not let him know and he will have to go to the Er  Thank you

## 2020-07-15 ENCOUNTER — HOSPITAL ENCOUNTER (EMERGENCY)
Facility: HOSPITAL | Age: 50
Discharge: HOME | End: 2020-07-15
Attending: EMERGENCY MEDICINE | Admitting: EMERGENCY MEDICINE
Payer: COMMERCIAL

## 2020-07-15 ENCOUNTER — APPOINTMENT (EMERGENCY)
Dept: RADIOLOGY | Facility: HOSPITAL | Age: 50
End: 2020-07-15
Attending: EMERGENCY MEDICINE
Payer: COMMERCIAL

## 2020-07-15 VITALS
RESPIRATION RATE: 18 BRPM | WEIGHT: 267 LBS | HEART RATE: 73 BPM | SYSTOLIC BLOOD PRESSURE: 131 MMHG | TEMPERATURE: 98.1 F | OXYGEN SATURATION: 96 % | DIASTOLIC BLOOD PRESSURE: 72 MMHG | BODY MASS INDEX: 40.3 KG/M2

## 2020-07-15 DIAGNOSIS — R90.89 ABNORMAL BRAIN MRI: ICD-10-CM

## 2020-07-15 DIAGNOSIS — R51.9 HEADACHE: Primary | ICD-10-CM

## 2020-07-15 LAB
ANION GAP SERPL CALCULATED.3IONS-SCNC: 7 MMOL/L (ref 4–13)
BASOPHILS # BLD AUTO: 0.03 THOUSANDS/ΜL (ref 0–0.1)
BASOPHILS NFR BLD AUTO: 1 % (ref 0–1)
BUN SERPL-MCNC: 11 MG/DL (ref 5–25)
CALCIUM SERPL-MCNC: 8.8 MG/DL (ref 8.3–10.1)
CHLORIDE SERPL-SCNC: 104 MMOL/L (ref 100–108)
CO2 SERPL-SCNC: 29 MMOL/L (ref 21–32)
CREAT SERPL-MCNC: 1.11 MG/DL (ref 0.6–1.3)
EOSINOPHIL # BLD AUTO: 0.09 THOUSAND/ΜL (ref 0–0.61)
EOSINOPHIL NFR BLD AUTO: 2 % (ref 0–6)
ERYTHROCYTE [DISTWIDTH] IN BLOOD BY AUTOMATED COUNT: 11.6 % (ref 11.6–15.1)
GFR SERPL CREATININE-BSD FRML MDRD: 78 ML/MIN/1.73SQ M
GLUCOSE SERPL-MCNC: 109 MG/DL (ref 65–140)
HCT VFR BLD AUTO: 45.4 % (ref 36.5–49.3)
HGB BLD-MCNC: 15.7 G/DL (ref 12–17)
IMM GRANULOCYTES # BLD AUTO: 0.02 THOUSAND/UL (ref 0–0.2)
IMM GRANULOCYTES NFR BLD AUTO: 1 % (ref 0–2)
LYMPHOCYTES # BLD AUTO: 0.92 THOUSANDS/ΜL (ref 0.6–4.47)
LYMPHOCYTES NFR BLD AUTO: 23 % (ref 14–44)
MCH RBC QN AUTO: 32.4 PG (ref 26.8–34.3)
MCHC RBC AUTO-ENTMCNC: 34.6 G/DL (ref 31.4–37.4)
MCV RBC AUTO: 94 FL (ref 82–98)
MONOCYTES # BLD AUTO: 0.66 THOUSAND/ΜL (ref 0.17–1.22)
MONOCYTES NFR BLD AUTO: 16 % (ref 4–12)
NEUTROPHILS # BLD AUTO: 2.34 THOUSANDS/ΜL (ref 1.85–7.62)
NEUTS SEG NFR BLD AUTO: 57 % (ref 43–75)
NRBC BLD AUTO-RTO: 0 /100 WBCS
PLATELET # BLD AUTO: 175 THOUSANDS/UL (ref 149–390)
PMV BLD AUTO: 9.3 FL (ref 8.9–12.7)
POTASSIUM SERPL-SCNC: 4.1 MMOL/L (ref 3.5–5.3)
RBC # BLD AUTO: 4.85 MILLION/UL (ref 3.88–5.62)
SODIUM SERPL-SCNC: 140 MMOL/L (ref 136–145)
TROPONIN I SERPL-MCNC: <0.02 NG/ML
WBC # BLD AUTO: 4.06 THOUSAND/UL (ref 4.31–10.16)

## 2020-07-15 PROCEDURE — 84484 ASSAY OF TROPONIN QUANT: CPT | Performed by: EMERGENCY MEDICINE

## 2020-07-15 PROCEDURE — 99284 EMERGENCY DEPT VISIT MOD MDM: CPT

## 2020-07-15 PROCEDURE — 96365 THER/PROPH/DIAG IV INF INIT: CPT

## 2020-07-15 PROCEDURE — 80048 BASIC METABOLIC PNL TOTAL CA: CPT | Performed by: EMERGENCY MEDICINE

## 2020-07-15 PROCEDURE — 71045 X-RAY EXAM CHEST 1 VIEW: CPT

## 2020-07-15 PROCEDURE — 85025 COMPLETE CBC W/AUTO DIFF WBC: CPT | Performed by: EMERGENCY MEDICINE

## 2020-07-15 PROCEDURE — 96375 TX/PRO/DX INJ NEW DRUG ADDON: CPT

## 2020-07-15 PROCEDURE — 93005 ELECTROCARDIOGRAM TRACING: CPT

## 2020-07-15 PROCEDURE — 36415 COLL VENOUS BLD VENIPUNCTURE: CPT | Performed by: EMERGENCY MEDICINE

## 2020-07-15 PROCEDURE — 99285 EMERGENCY DEPT VISIT HI MDM: CPT | Performed by: EMERGENCY MEDICINE

## 2020-07-15 RX ORDER — METHYLPREDNISOLONE 4 MG/1
TABLET ORAL
Qty: 21 TABLET | Refills: 0 | Status: SHIPPED | OUTPATIENT
Start: 2020-07-15 | End: 2020-07-23

## 2020-07-15 RX ORDER — NAPROXEN 500 MG/1
500 TABLET ORAL 2 TIMES DAILY WITH MEALS
Qty: 14 TABLET | Refills: 0 | Status: SHIPPED | OUTPATIENT
Start: 2020-07-15 | End: 2020-08-04

## 2020-07-15 RX ORDER — DIPHENHYDRAMINE HYDROCHLORIDE 50 MG/ML
25 INJECTION INTRAMUSCULAR; INTRAVENOUS ONCE
Status: COMPLETED | OUTPATIENT
Start: 2020-07-15 | End: 2020-07-15

## 2020-07-15 RX ORDER — KETOROLAC TROMETHAMINE 30 MG/ML
15 INJECTION, SOLUTION INTRAMUSCULAR; INTRAVENOUS ONCE
Status: COMPLETED | OUTPATIENT
Start: 2020-07-15 | End: 2020-07-15

## 2020-07-15 RX ORDER — MAGNESIUM SULFATE HEPTAHYDRATE 40 MG/ML
2 INJECTION, SOLUTION INTRAVENOUS ONCE
Status: COMPLETED | OUTPATIENT
Start: 2020-07-15 | End: 2020-07-15

## 2020-07-15 RX ORDER — METOCLOPRAMIDE HYDROCHLORIDE 5 MG/ML
10 INJECTION INTRAMUSCULAR; INTRAVENOUS ONCE
Status: COMPLETED | OUTPATIENT
Start: 2020-07-15 | End: 2020-07-15

## 2020-07-15 RX ORDER — DEXAMETHASONE SODIUM PHOSPHATE 10 MG/ML
10 INJECTION, SOLUTION INTRAMUSCULAR; INTRAVENOUS ONCE
Status: COMPLETED | OUTPATIENT
Start: 2020-07-15 | End: 2020-07-15

## 2020-07-15 RX ADMIN — MAGNESIUM SULFATE HEPTAHYDRATE 2 G: 40 INJECTION, SOLUTION INTRAVENOUS at 06:40

## 2020-07-15 RX ADMIN — KETOROLAC TROMETHAMINE 15 MG: 30 INJECTION, SOLUTION INTRAMUSCULAR at 06:36

## 2020-07-15 RX ADMIN — METOCLOPRAMIDE HYDROCHLORIDE 10 MG: 5 INJECTION INTRAMUSCULAR; INTRAVENOUS at 06:39

## 2020-07-15 RX ADMIN — SODIUM CHLORIDE 1000 ML: 0.9 INJECTION, SOLUTION INTRAVENOUS at 06:35

## 2020-07-15 RX ADMIN — DIPHENHYDRAMINE HYDROCHLORIDE 25 MG: 50 INJECTION INTRAMUSCULAR; INTRAVENOUS at 06:38

## 2020-07-15 RX ADMIN — DEXAMETHASONE SODIUM PHOSPHATE 10 MG: 10 INJECTION, SOLUTION INTRAMUSCULAR; INTRAVENOUS at 07:24

## 2020-07-15 NOTE — ED PROVIDER NOTES
History  Chief Complaint   Patient presents with    Headache     extreme ongoing headaches 10/10 pain     57-year-old male with no significant past medical history presents for evaluation of headaches which have been going on for approximately 1 month  Patient states that the headaches are constant throughout the day though he does not noticed him when he sleeps  The headaches do not wax and wane, there are not positional, not exertional   There only minimally relieved by ibuprofen which he last took last night  There is no associated nausea or vomiting, no associated neck pain, no associated visual change  No numbness or tingling extremities  No fevers or chills  He does state that every few days in the morning he has a coughing fit that last several minutes, he usually feels like he is about to "black out" and did syncopal eyes once or twice, most recently approximately 2 weeks ago  He is under the care of his primary care provider who had blood work done, sent him for an MRI which showed 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, otherwise no abnormalities  Blood work was unremarkable including Lyme and TSH  He is in the process of getting in touch with the neurologist however because he continued having symptoms and called his PCP for further treatment recommendations he was told to come to the emergency department for evaluation given continued symptoms  Patient denies any new or worsening symptoms  No further syncopal episodes  Of note he recently got switched from lisinopril to losartan because of this continued cough though continues to have coughing episodes mostly in the morning when he wakes up, cough is not productive no associated shortness of breath  No coughing throughout the day  Patient's brother had Chiari malformation which the patient states was severe and symptomatic, however he himself has never been diagnosed with that or has ever had symptoms prior to this  Prior to Admission Medications   Prescriptions Last Dose Informant Patient Reported? Taking? cyclobenzaprine (FLEXERIL) 10 mg tablet   No No   Sig: TAKE 1 TABLET (10 MG TOTAL) BY MOUTH DAILY AT BEDTIME AS NEEDED FOR MUSCLE SPASMS   losartan (Cozaar) 50 mg tablet   No No   Sig: Take 1 tablet (50 mg total) by mouth daily   mometasone (ELOCON) 0 1 % cream   No No   Sig: Apply topically daily      Facility-Administered Medications: None       No past medical history on file  Past Surgical History:   Procedure Laterality Date    CARPAL TUNNEL RELEASE Left        Family History   Problem Relation Age of Onset    No Known Problems Mother     No Known Problems Father     Cancer Maternal Grandmother     Cancer Maternal Grandfather     Substance Abuse Brother     Substance Abuse Brother     Alcohol abuse Neg Hx     Mental illness Neg Hx      I have reviewed and agree with the history as documented  E-Cigarette/Vaping    E-Cigarette Use Never User      E-Cigarette/Vaping Substances     Social History     Tobacco Use    Smoking status: Current Every Day Smoker     Packs/day: 0 50    Smokeless tobacco: Never Used   Substance Use Topics    Alcohol use: Yes     Comment: Everyday     Drug use: No       Review of Systems   Constitutional: Negative for appetite change, chills and fever  HENT: Negative for rhinorrhea and sore throat  Eyes: Negative for photophobia and visual disturbance  Respiratory: Positive for cough  Negative for shortness of breath  Cardiovascular: Negative for chest pain and palpitations  Gastrointestinal: Negative for abdominal pain and diarrhea  Genitourinary: Negative for dysuria, frequency and urgency  Skin: Negative for rash  Neurological: Positive for syncope, light-headedness and headaches  Negative for dizziness and weakness  All other systems reviewed and are negative        Physical Exam  Physical Exam   Constitutional: He is oriented to person, place, and time  He appears well-developed and well-nourished  HENT:   Head: Normocephalic and atraumatic  Right Ear: External ear normal    Left Ear: External ear normal    Mouth/Throat: Oropharynx is clear and moist    Eyes: Pupils are equal, round, and reactive to light  Conjunctivae and EOM are normal    Neck: Normal range of motion  Neck supple  No JVD present  No tracheal deviation present  Cardiovascular: Normal rate, regular rhythm and normal heart sounds  Exam reveals no gallop and no friction rub  No murmur heard  Pulmonary/Chest: Effort normal  No stridor  No respiratory distress  He has no wheezes  He has no rales  Abdominal: Soft  He exhibits no distension and no mass  There is no tenderness  There is no rebound and no guarding  Musculoskeletal: Normal range of motion  He exhibits no edema  Neurological: He is alert and oriented to person, place, and time  No cranial nerve deficit or sensory deficit  He exhibits normal muscle tone  Coordination normal    Normal speech, normal gait, extraocular movements intact with no nystagmus, visual fields full by confrontation    No dysmetria  No drift  Muscle strength 5/5 bilateral upper lower extremity   Skin: Skin is warm and dry  No rash noted  No erythema  No pallor  Psychiatric: He has a normal mood and affect  Nursing note and vitals reviewed        Vital Signs  ED Triage Vitals   Temperature Pulse Respirations Blood Pressure SpO2   07/15/20 0619 07/15/20 0619 07/15/20 0619 07/15/20 0619 07/15/20 0619   98 1 °F (36 7 °C) 91 18 (!) 173/98 98 %      Temp Source Heart Rate Source Patient Position - Orthostatic VS BP Location FiO2 (%)   07/15/20 0619 07/15/20 0619 07/15/20 0619 07/15/20 0619 --   Temporal Monitor Lying Right arm       Pain Score       07/15/20 0622       Worst Possible Pain           Vitals:    07/15/20 0630 07/15/20 0700 07/15/20 0715 07/15/20 0730   BP: 149/84 140/75  131/72   Pulse: 81 81 78 73   Patient Position - Orthostatic VS: Visual Acuity  Visual Acuity      Most Recent Value   L Pupil Size (mm)  3   R Pupil Size (mm)  3          ED Medications  Medications   sodium chloride 0 9 % bolus 1,000 mL (0 mL Intravenous Stopped 7/15/20 0727)   ketorolac (TORADOL) injection 15 mg (15 mg Intravenous Given 7/15/20 0636)   metoclopramide (REGLAN) injection 10 mg (10 mg Intravenous Given 7/15/20 0639)   diphenhydrAMINE (BENADRYL) injection 25 mg (25 mg Intravenous Given 7/15/20 0638)   magnesium sulfate 2 g/50 mL IVPB (premix) 2 g (0 g Intravenous Stopped 7/15/20 0727)   dexamethasone (PF) (DECADRON) injection 10 mg (10 mg Intravenous Given 7/15/20 0724)       Diagnostic Studies  Results Reviewed     Procedure Component Value Units Date/Time    Troponin I [[id number]]  (Normal) Collected:  07/15/20 0640    Lab Status:  Final result Specimen:  Blood from Arm, Right Updated:  07/15/20 0708     Troponin I <0 02 ng/mL     Basic metabolic panel [[id number]] Collected:  07/15/20 0640    Lab Status:  Final result Specimen:  Blood from Arm, Right Updated:  07/15/20 0700     Sodium 140 mmol/L      Potassium 4 1 mmol/L      Chloride 104 mmol/L      CO2 29 mmol/L      ANION GAP 7 mmol/L      BUN 11 mg/dL      Creatinine 1 11 mg/dL      Glucose 109 mg/dL      Calcium 8 8 mg/dL      eGFR 78 ml/min/1 73sq m     Narrative:       Samuel guidelines for Chronic Kidney Disease (CKD):     Stage 1 with normal or high GFR (GFR > 90 mL/min/1 73 square meters)    Stage 2 Mild CKD (GFR = 60-89 mL/min/1 73 square meters)    Stage 3A Moderate CKD (GFR = 45-59 mL/min/1 73 square meters)    Stage 3B Moderate CKD (GFR = 30-44 mL/min/1 73 square meters)    Stage 4 Severe CKD (GFR = 15-29 mL/min/1 73 square meters)    Stage 5 End Stage CKD (GFR <15 mL/min/1 73 square meters)  Note: GFR calculation is accurate only with a steady state creatinine    CBC and differential [[id number]]  (Abnormal) Collected:  07/15/20 0640    Lab Status:  Final result Specimen:  Blood from Arm, Right Updated:  07/15/20 0648     WBC 4 06 Thousand/uL      RBC 4 85 Million/uL      Hemoglobin 15 7 g/dL      Hematocrit 45 4 %      MCV 94 fL      MCH 32 4 pg      MCHC 34 6 g/dL      RDW 11 6 %      MPV 9 3 fL      Platelets 306 Thousands/uL      nRBC 0 /100 WBCs      Neutrophils Relative 57 %      Immat GRANS % 1 %      Lymphocytes Relative 23 %      Monocytes Relative 16 %      Eosinophils Relative 2 %      Basophils Relative 1 %      Neutrophils Absolute 2 34 Thousands/µL      Immature Grans Absolute 0 02 Thousand/uL      Lymphocytes Absolute 0 92 Thousands/µL      Monocytes Absolute 0 66 Thousand/µL      Eosinophils Absolute 0 09 Thousand/µL      Basophils Absolute 0 03 Thousands/µL                  XR chest portable   Final Result by Cooper Jeff MD (07/15 0725)      No acute cardiopulmonary disease  Workstation performed: [id number]                    Procedures  Procedures         ED Course  ED Course as of Jul 15 2244   Wed Jul 15, 2020   3906 MRI No mass effect, acute intracranial hemorrhage or evidence of recent infarction  No abnormal parenchymal or leptomeningeal enhancement identified      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  0710 Procedure Note: EKG  Date/Time: 07/15/20 7:10 AM   Performed by: Omar Crowe  Authorized by: Omar Crowe  Indications / Diagnosis: CP  ECG reviewed by me, the ED Provider: yes   The EKG demonstrates:  Rhythm: normal sinus  Intervals: normal intervals  Axis: normal axis  QRS/Blocks: normal QRS  ST Changes: No acute ST Changes, no STD/LISSETH           8826 Spoke to Dr Joy Forman, Neurology on call , discussed the case including imaging and patient exam, given that patient is neurologically intact neurology recommends symptomatic treatment with migraine cocktail and starting on Medrol Dosepak, patient's information provided to Neurology and the office will be contacting patient for an appointment as soon as possible  Careful return precautions provided          US AUDIT      Most Recent Value   Initial Alcohol Screen: US AUDIT-C    1  How often do you have a drink containing alcohol? 6 Filed at: 07/15/2020 0624   2  How many drinks containing alcohol do you have on a typical day you are drinking? 3 Filed at: 07/15/2020 0624   3a  Male UNDER 65: How often do you have five or more drinks on one occasion? 0 Filed at: 07/15/2020 0624   3b  FEMALE Any Age, or MALE 65+: How often do you have 4 or more drinks on one occassion? 0 Filed at: 07/15/2020 0624   Audit-C Score  (!) 9 Filed at: 07/15/2020 5614   Full Alcohol Screen: US AUDIT   4  How often during the last year have you found that you were not able to stop drinking once you had started? 0 Filed at: 07/15/2020 0624   5  How often during past year have you failed to do what was normally expected of you because of drinking? 0 Filed at: 07/15/2020 0624   6  How often in past year have you needed a first drink in the morning to get yourself going after a heavy drinking session? 0 Filed at: 07/15/2020 0624   7  How often in past year have you had feeling of guilt or remorse after drinking? 0 Filed at: 07/15/2020 0624   8  How often in past year have you been unable to remember what happened night before because you had been drinking? 0 Filed at: 07/15/2020 0624   9  Have you or someone else been injured as a result of your drinking? 0 Filed at: 07/15/2020 0624   10  Has a relative, friend, doctor or other health worker been concerned about your drinking and suggested you cut down?   0 Filed at: 07/15/2020 9987   AUDIT Total Score  9 Filed at: 07/15/2020 4675                  AMARI/DAST-10      Most Recent Value   How many times in the past year have you    Used an illegal drug or used a prescription medication for non-medical reasons?   Never Filed at: 07/15/2020 0959                                MDM  Number of Diagnoses or Management Options  Diagnosis management comments: 81-year-old male presents for evaluation of continued headaches x1 month, recent MRI with 5 mm of cerebellar ectopia  Neuro exam unremarkable  Will contact neurology for further input        Disposition  Final diagnoses:   Headache   Abnormal brain MRI     Time reflects when diagnosis was documented in both MDM as applicable and the Disposition within this note     Time User Action Codes Description Comment    7/15/2020  7:16 AM Gary Earing Add [R51] Headache     7/15/2020  7:16 AM Gary Earing Add [R93 89] Abnormal MRI     7/15/2020  7:16 AM Gary Earing Remove [R93 89] Abnormal MRI     7/15/2020  7:16 AM Gary Earing Add [R90 89] Abnormal brain MRI       ED Disposition     ED Disposition Condition Date/Time Comment    Discharge Stable Wed Jul 15, 2020  7:59 AM Una Polite discharge to home/self care              Follow-up Information     Follow up With Specialties Details Why Contact Info Additional Information    Abdullahi Luis PA-C Family Medicine, Physician Assistant Schedule an appointment as soon as possible for a visit  As needed [address] [facility], [address] [facility] 207 Emergency Department Emergency Medicine  If symptoms worsen [address] [place], [address]  [phone]  ED, [address] [facility], [facility], [address]    [facility] Neurology Schedule an appointment as soon as possible for a visit   [address] [facility] [phone]  [address] [facility] Neurology [address], [address], [address] [facility], [place], [place], [address] [facility]    [address] [place] [address] Neurosurgery Schedule an appointment as soon as possible for a visit   [address] [facility] [address] [address]  [address] [facility], [address], [address], [place], [place], [address]   [phone]          Discharge Medication List as of 7/15/2020  8:00 AM      START taking these medications    Details   methylPREDNISolone 4 MG tablet therapy pack Use as directed on package, Normal      naproxen (NAPROSYN) 500 mg tablet Take 1 tablet (500 mg total) by mouth 2 (two) times a day with meals, Starting Wed 7/15/2020, Normal         CONTINUE these medications which have NOT CHANGED    Details   cyclobenzaprine (FLEXERIL) 10 mg tablet TAKE 1 TABLET (10 MG TOTAL) BY MOUTH DAILY AT BEDTIME AS NEEDED FOR MUSCLE SPASMS, Starting Thu 4/2/2020, Normal      losartan (Cozaar) 50 mg tablet Take 1 tablet (50 mg total) by mouth daily, Starting Tue 6/30/2020, Normal      mometasone (ELOCON) 0 1 % cream Apply topically daily, Starting Tue 6/30/2020, Normal               PDMP Review     None          ED Provider  Electronically Signed by           Maria Antonia Evans MD  07/15/20 1078

## 2020-07-15 NOTE — DISCHARGE INSTRUCTIONS
Please follow-up with neurology as soon as possible for further care, if symptoms worsen or you develop visual changes, vomiting, numbness or tingling of your hands or feet, weakness or any other concerning symptoms please return to the emergency department

## 2020-07-15 NOTE — ED CARE HANDOFF
Emergency Department Sign Out Note        Sign out and transfer of care from Dr Jasen Horner  See Separate Emergency Department note  The patient, Mario Sena, was evaluated by the previous provider for headache with abnormal MRI  Workup Completed:  Neurology consulted  Patient to receive medrol dose pack for symptomatic treatment with outpatient follow up with neurology and neurosurgery  Currently receiving migraine cocktail  ED Course / Workup Pending (followup): Patient reports headache improved on re-assessment  Discussed return precautions with patient  Procedures  MDM    Disposition  Final diagnoses:   Headache   Abnormal brain MRI     Time reflects when diagnosis was documented in both MDM as applicable and the Disposition within this note     Time User Action Codes Description Comment    7/15/2020  7:16 AM Janet Civatte Add [R51] Headache     7/15/2020  7:16 AM Janet Civatte Add [R93 89] Abnormal MRI     7/15/2020  7:16 AM Janet Civatte Remove [R93 89] Abnormal MRI     7/15/2020  7:16 AM Janet Civatte Add [R90 89] Abnormal brain MRI       ED Disposition     ED Disposition Condition Date/Time Comment    Discharge Stable Wed Jul 15, 2020  7:59 AM Mario Sena discharge to home/self care              Follow-up Information     Follow up With Specialties Details Why Contact Info Additional Information    Lacey Sandoval PA-C Family Medicine, Physician Assistant Schedule an appointment as soon as possible for a visit  As needed [address] [facility] [address]70 Emergency Department Emergency Medicine  If symptoms worsen [address] [place],[address]  [phone]  ED, [address] [place], [facility], [address]    [facility] Neurology Schedule an appointment as soon as possible for a visit   [address] [facility] [address] [facility] [address]  [address] [facility] [address], [address], [address], [place], [place], [address]    T[facility] [address] Neurosurgery Schedule an appointment as soon as possible for a visit   [address] [facility] [address] [address]  Darryn 1 Neurosurgical [address], [address], [address], [place], [place], [address] [phone]        Patient's Medications   Discharge Prescriptions    METHYLPREDNISOLONE 4 MG TABLET THERAPY PACK    Use as directed on package       Start Date: 7/15/2020 End Date: --       Order Dose: --       Quantity: 21 tablet    Refills: 0    NAPROXEN (NAPROSYN) 500 MG TABLET    Take 1 tablet (500 mg total) by mouth 2 (two) times a day with meals       Start Date: 7/15/2020 End Date: --       Order Dose: 500 mg       Quantity: 14 tablet    Refills: 0            ED Provider  Electronically Signed by     Bishop Maryjane MD  07/15/20 5586

## 2020-07-16 ENCOUNTER — TELEPHONE (OUTPATIENT)
Dept: NEUROLOGY | Facility: CLINIC | Age: 50
End: 2020-07-16

## 2020-07-16 NOTE — TELEPHONE ENCOUNTER
Best contact number for patient:  [phone]  Emergency Contact name and number:  NPVT  Referring provider and telephone number:  Lisa Roberto  Primary Care Provider Name and if affiliated with [facility]:     Reason for Appointment/Dx:  Headache - abnormal MRI  Neurology Location patient would like to be seen:  [place]  Order received? Yes                                                 Records Received? Yes    Have you ever seen another Neurologist?       No    Insurance Information    Insurance Name:  Raelene Kussmaul  ID/Policy #:    Secondary Insurance:    ID/Policy#: Workman's Comp/ Accident/ School  Information      Workman's Comp/Accident/School related? No    If yes name of Insurance company:    Date of Injury:    Type of Injury:    [address] Name and Telephone Number:    Notes:                   Appointment date:     New pt appt made with Dr Lizeth Sinclair 7/17 @ 1:45 - asked pt to arrive early for ppwk

## 2020-07-16 NOTE — TELEPHONE ENCOUNTER
Please schedule patient for HOSP F/U  Thank you! Also transferred the call to Central Scheduling  Thank you!

## 2020-07-16 NOTE — TELEPHONE ENCOUNTER
Patient called, said he has not heard anything from neuro and he needs a note for work stating that with him blacking out it is dangerous for him to be working  I told him that he should really be getting that from Neuro and that I will call them and try to get him in  I called, and spoke to them, " the awesome  and " got him in for tomorrow to see Dr Jomar Peraza in the [facility] office @ 1:30  I just did not want him to fall thru the cracks  Called patient, gave him time and address of appointment

## 2020-07-17 ENCOUNTER — CONSULT (OUTPATIENT)
Dept: NEUROLOGY | Facility: CLINIC | Age: 50
End: 2020-07-17
Payer: COMMERCIAL

## 2020-07-17 VITALS
HEIGHT: 68 IN | DIASTOLIC BLOOD PRESSURE: 81 MMHG | TEMPERATURE: 97.6 F | RESPIRATION RATE: 18 BRPM | HEART RATE: 82 BPM | WEIGHT: 270 LBS | SYSTOLIC BLOOD PRESSURE: 138 MMHG | BODY MASS INDEX: 40.92 KG/M2

## 2020-07-17 DIAGNOSIS — R90.89 ABNORMAL BRAIN MRI: ICD-10-CM

## 2020-07-17 DIAGNOSIS — R06.83 SNORING: ICD-10-CM

## 2020-07-17 DIAGNOSIS — G44.52 NEW DAILY PERSISTENT HEADACHE: Primary | ICD-10-CM

## 2020-07-17 DIAGNOSIS — R55 NEAR SYNCOPE: ICD-10-CM

## 2020-07-17 PROCEDURE — 99244 OFF/OP CNSLTJ NEW/EST MOD 40: CPT | Performed by: PSYCHIATRY & NEUROLOGY

## 2020-07-17 RX ORDER — BACLOFEN 10 MG/1
TABLET ORAL
Qty: 90 TABLET | Refills: 2 | Status: SHIPPED | OUTPATIENT
Start: 2020-07-17 | End: 2020-10-06

## 2020-07-17 NOTE — LETTER
2020     Patient: Jesse Barbosa   YOB: 1970   Date of Visit: 2020       To Whom it May Concern:    Nina [place]oswood is under my professional care  He was seen in my office on 2020  Given the nature of his his job, he is not to return to work until cleared following further evaluation  If you have any questions or concerns, please don't hesitate to call           Sincerely,          Karthik Corbett MD        CC: No Recipients

## 2020-07-17 NOTE — ASSESSMENT & PLAN NOTE
Reportedly excessively with observed apneic gaps  Despite the fact that he is currently not describing daytime sleepiness, obstructive sleep apnea is being considered  --ambulatory referral to sleep medicine has already been placed

## 2020-07-17 NOTE — PATIENT INSTRUCTIONS
Begin baclofen 10 mg tablets taking 1 tablet at bedtime daily for 3 days then 1 tablet twice daily  Continue not using your Flexeril since she or starting baclofen  Call in 2 weeks with a status update or before then should you have any questions or concerns  Please discuss with your primary care provider an appropriate approach to smoking cessation

## 2020-07-17 NOTE — LETTER
July 17, 2020     William Rosales PA-C  [address] [place] [address]    Patient: Mario Valencia   YOB: 1970   Date of Visit: 7/17/2020       Dear Dr Cheatham Page: Thank you for referring Lindsay Singh to me for evaluation  Below are my notes for this consultation  If you have questions, please do not hesitate to call me  I look forward to following your patient along with you  Sincerely,        Nichelle Mejia MD        CC: MD Nichelle Humphries MD  7/17/2020  2:55 PM  Sign at close encounter  Patient ID: Mario Valencia is a 52 y o  male  Assessment/Plan:    New daily persistent headache  As described fits best into a tension-type or myogenic origin  Has taken on an every day all day presence  Question the possibility of some aggravation in the presence of a possible primary sleep disturbance  --for symptomatic purposes, trial on baclofen 10 mg tablets beginning with 1 tablet at bedtime daily for 3 days followed by 1 tablet twice daily  This can be further advanced pending clinical response and tolerance  Potential side effects reviewed  --patient has been prescribed Flexeril in the past but has not been taking it and is aware that he is not to take it while on baclofen  --instructed to call the office in 2 weeks with a status update and to discuss possible further change in his baclofen schedule  He will call before then should he have questions or concerns  Near syncope, recurrent  As described appears to represent cough near-syncope, with 1 brief episode of loss of conscious in the recent past   Likely aggravated by the fact that he continues smoking  Will require additional study to confirm  --sleep-deprived EEG   --24 hour Holter monitor study  --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  He is presently not working as result    He will continue out of work pending the results of his additional studies  --patient advised to speak with his PCP regarding an appropriate smoking cessation program     Snoring  Reportedly excessively with observed apneic gaps  Despite the fact that he is currently not describing daytime sleepiness, obstructive sleep apnea is being considered  --ambulatory referral to sleep medicine has already been placed  Abnormal brain MRI  Appears to represent an incidental finding  Study demonstrates 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  --being scheduled to see Neurosurgery  He will follow up in 8 weeks    Subjective:    HPI  Patient, 52years of age and right-handed, with the additional diagnosis hypertension, presents for further evaluation regarding several issues  The 1st is that of headache  Over the past several months he has been experiencing the presence of a holocranial headache, described as a tightness which has become present from the moment he gets up through till the moment he goes to bed at night  He he generally does not use any over-the-counter agent for symptomatic purposes ingest works with the headache  He denies any significant posterior cervical issues in association  He describes any past history of similar headache or other significant headache issues  A 2nd issue is that of recurrent episodes of lightheadedness and 1 recent past episode of brief loss of consciousness that occur in the immediate aftermath of a persistent coughing fit  He has no past history of documented seizure  He has no known significant cardiac history  He is, however, smoker  He did have a brain MRI performed  That study was reviewed  It was unremarkable except for a 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  He is also to be seen by Neurosurgery for this issue  His last issue is that of heavy snoring with witnessed apneic gaps    He comments that he does not have significant issues with daytime sleepiness but nonetheless is in the process of an appointment with Sleep Medicine for further evaluation  Studies reviewed:  -brain MRI as outlined above  -CBC with hemoglobin 15 7, hematocrit 45 4, white count 4 06 and platelet count 093  -BMP with sodium 140, potassium 4 1 and creatinine 1 11  -AST 29 and ALT 55  History reviewed  No pertinent past medical history    Past Surgical History:   Procedure Laterality Date    CARPAL TUNNEL RELEASE Left      Social History     Socioeconomic History    Marital status: Single     Spouse name: None    Number of children: None    Years of education: None    Highest education level: None   Occupational History    None   Social Needs    Financial resource strain: None    Food insecurity:     Worry: None     Inability: None    Transportation needs:     Medical: None     Non-medical: None   Tobacco Use    Smoking status: Current Every Day Smoker     Packs/day: 0 50    Smokeless tobacco: Never Used   Substance and Sexual Activity    Alcohol use: Yes     Comment: Everyday     Drug use: No    Sexual activity: Yes     Partners: Female   Lifestyle    Physical activity:     Days per week: None     Minutes per session: None    Stress: None   Relationships    Social connections:     Talks on phone: None     Gets together: None     Attends Oriental orthodox service: None     Active member of club or organization: None     Attends meetings of clubs or organizations: None     Relationship status: None    Intimate partner violence:     Fear of current or ex partner: None     Emotionally abused: None     Physically abused: None     Forced sexual activity: None   Other Topics Concern    None   Social History Narrative    None     Family History   Problem Relation Age of Onset    No Known Problems Mother     No Known Problems Father     Cancer Maternal Grandmother     Cancer Maternal Grandfather     Substance Abuse Brother     Substance Abuse Brother     Alcohol abuse Neg Hx     Mental illness Neg Hx      Allergies   Allergen Reactions    Lisinopril Cough       Current Outpatient Medications:     losartan (Cozaar) 50 mg tablet, Take 1 tablet (50 mg total) by mouth daily, Disp: 90 tablet, Rfl: 3    methylPREDNISolone 4 MG tablet therapy pack, Use as directed on package, Disp: 21 tablet, Rfl: 0    mometasone (ELOCON) 0 1 % cream, Apply topically daily, Disp: 45 g, Rfl: 0    naproxen (NAPROSYN) 500 mg tablet, Take 1 tablet (500 mg total) by mouth 2 (two) times a day with meals, Disp: 14 tablet, Rfl: 0    baclofen 10 mg tablet, By mouth take 1 tablet 3 times daily or as directed, Disp: 90 tablet, Rfl: 2    Objective:    Blood pressure 138/81, pulse 82, temperature 97 6 °F (36 4 °C), resp  rate 18, height 5' 8" (1 727 m), weight 122 kg (270 lb)  Physical Exam  BMI 41 05  Head normocephalic  Eyes nonicteric  Very, very small posterior oropharyngeal aperture  No audible head or anterior neck bruits  Lungs clear to auscultation  Rhythm regular  GI (abdomen) soft nontender with bowel sounds present  No significant lower extremity edema  Neurological Exam  Alert  Pleasantly and appropriately conversational   Fully oriented  No dysarthria  Unremarkable spontaneous gait  Able to tandem walk  Romberg maneuver performed unremarkably  Cranial Nerves:   I: Olfactory sense intact bilaterally  II: Visual fields full to confrontation  Pupils equal, round, reactive to light with normal accomodation  Fundus: with bilaterally marginated discs  III,IV,VI: Extraocular muscles EOMI, no nystagmus  V: Masseter and pterygoid strength  Sensation in the V1 through V3 distributions intact to pinprick and light touch bilaterally  VII: Face is symmetric with no weakness noted  VIII: Audition intact to finger rub bilaterally  IX/X: Uvula midline  Soft palate elevation symmetric  XI: Trapezius and SCM strength 5/5 bilaterally    XII: Tongue midline with no atrophy or fasciculations with appropriate movement  Accurate with finger-to-nose and heel-to-shin maneuvers bilaterally  Full symmetrical strength throughout the 4 extremities  No upper extremity drift  Sensory testing grossly intact to pin, position and vibration throughout  Muscle stretch reflexes bilaterally 1 throughout the upper extremities and bilaterally 1+ at the knees and bilaterally 2 at the ankles  Toe response downgoing bilaterally  ROS:    Review of Systems   Constitutional: Negative  Negative for appetite change and fever  HENT: Negative  Negative for hearing loss, tinnitus, trouble swallowing and voice change  Eyes: Negative  Negative for photophobia and pain  Respiratory: Negative  Negative for shortness of breath  Cardiovascular: Negative  Negative for palpitations  Gastrointestinal: Negative  Negative for nausea and vomiting  Endocrine: Negative  Negative for cold intolerance  Genitourinary: Negative  Negative for dysuria, frequency and urgency  Musculoskeletal: Negative  Negative for myalgias and neck pain  Skin: Negative  Negative for rash  Allergic/Immunologic: Negative  Neurological: Positive for dizziness, syncope (2 weeks ago), light-headedness, numbness (right ear goes down to neck and right shoulder at times) and headaches  Negative for tremors, seizures, facial asymmetry, speech difficulty and weakness  Hematological: Negative  Does not bruise/bleed easily  Psychiatric/Behavioral: Negative  Negative for confusion, hallucinations and sleep disturbance  I personally reviewed the ROS as entered by the medical assistant  *Please note this document was created using voice recognition software and may contain sound-alike word errors  *

## 2020-07-17 NOTE — ASSESSMENT & PLAN NOTE
As described fits best into a tension-type or myogenic origin  Has taken on an every day all day presence  Question the possibility of some aggravation in the presence of a possible primary sleep disturbance  --for symptomatic purposes, trial on baclofen 10 mg tablets beginning with 1 tablet at bedtime daily for 3 days followed by 1 tablet twice daily  This can be further advanced pending clinical response and tolerance  Potential side effects reviewed  --patient has been prescribed Flexeril in the past but has not been taking it and is aware that he is not to take it while on baclofen  --instructed to call the office in 2 weeks with a status update and to discuss possible further change in his baclofen schedule  He will call before then should he have questions or concerns

## 2020-07-17 NOTE — PROGRESS NOTES
Patient ID: Debbie Stacy is a 52 y o  male  Assessment/Plan:    New daily persistent headache  As described fits best into a tension-type or myogenic origin  Has taken on an every day all day presence  Question the possibility of some aggravation in the presence of a possible primary sleep disturbance  --for symptomatic purposes, trial on baclofen 10 mg tablets beginning with 1 tablet at bedtime daily for 3 days followed by 1 tablet twice daily  This can be further advanced pending clinical response and tolerance  Potential side effects reviewed  --patient has been prescribed Flexeril in the past but has not been taking it and is aware that he is not to take it while on baclofen  --instructed to call the office in 2 weeks with a status update and to discuss possible further change in his baclofen schedule  He will call before then should he have questions or concerns  Near syncope, recurrent  As described appears to represent cough near-syncope, with 1 brief episode of loss of conscious in the recent past   Likely aggravated by the fact that he continues smoking  Will require additional study to confirm  --sleep-deprived EEG   --24 hour Holter monitor study  --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  He is presently not working as result  He will continue out of work pending the results of his additional studies  --patient advised to speak with his PCP regarding an appropriate smoking cessation program     Snoring  Reportedly excessively with observed apneic gaps  Despite the fact that he is currently not describing daytime sleepiness, obstructive sleep apnea is being considered  --ambulatory referral to sleep medicine has already been placed  Abnormal brain MRI  Appears to represent an incidental finding  Study demonstrates 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    --being scheduled to see Neurosurgery  He will follow up in 8 weeks    Subjective:    HPI  Patient, 52years of age and right-handed, with the additional diagnosis hypertension, presents for further evaluation regarding several issues  The 1st is that of headache  Over the past several months he has been experiencing the presence of a holocranial headache, described as a tightness which has become present from the moment he gets up through till the moment he goes to bed at night  He he generally does not use any over-the-counter agent for symptomatic purposes ingest works with the headache  He denies any significant posterior cervical issues in association  He describes any past history of similar headache or other significant headache issues  A 2nd issue is that of recurrent episodes of lightheadedness and 1 recent past episode of brief loss of consciousness that occur in the immediate aftermath of a persistent coughing fit  He has no past history of documented seizure  He has no known significant cardiac history  He is, however, smoker  He did have a brain MRI performed  That study was reviewed  It was unremarkable except for a 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  He is also to be seen by Neurosurgery for this issue  His last issue is that of heavy snoring with witnessed apneic gaps  He comments that he does not have significant issues with daytime sleepiness but nonetheless is in the process of an appointment with Sleep Medicine for further evaluation  Studies reviewed:  -brain MRI as outlined above  -CBC with hemoglobin 15 7, hematocrit 45 4, white count 4 06 and platelet count 039  -BMP with sodium 140, potassium 4 1 and creatinine 1 11  -AST 29 and ALT 55  History reviewed  No pertinent past medical history    Past Surgical History:   Procedure Laterality Date    CARPAL TUNNEL RELEASE Left      Social History     Socioeconomic History    Marital status: Single     Spouse name: None  Number of children: None    Years of education: None    Highest education level: None   Occupational History    None   Social Needs    Financial resource strain: None    Food insecurity:     Worry: None     Inability: None    Transportation needs:     Medical: None     Non-medical: None   Tobacco Use    Smoking status: Current Every Day Smoker     Packs/day: 0 50    Smokeless tobacco: Never Used   Substance and Sexual Activity    Alcohol use: Yes     Comment: Everyday     Drug use: No    Sexual activity: Yes     Partners: Female   Lifestyle    Physical activity:     Days per week: None     Minutes per session: None    Stress: None   Relationships    Social connections:     Talks on phone: None     Gets together: None     Attends Rastafarian service: None     Active member of club or organization: None     Attends meetings of clubs or organizations: None     Relationship status: None    Intimate partner violence:     Fear of current or ex partner: None     Emotionally abused: None     Physically abused: None     Forced sexual activity: None   Other Topics Concern    None   Social History Narrative    None     Family History   Problem Relation Age of Onset    No Known Problems Mother     No Known Problems Father     Cancer Maternal Grandmother     Cancer Maternal Grandfather     Substance Abuse Brother     Substance Abuse Brother     Alcohol abuse Neg Hx     Mental illness Neg Hx      Allergies   Allergen Reactions    Lisinopril Cough       Current Outpatient Medications:     losartan (Cozaar) 50 mg tablet, Take 1 tablet (50 mg total) by mouth daily, Disp: 90 tablet, Rfl: 3    methylPREDNISolone 4 MG tablet therapy pack, Use as directed on package, Disp: 21 tablet, Rfl: 0    mometasone (ELOCON) 0 1 % cream, Apply topically daily, Disp: 45 g, Rfl: 0    naproxen (NAPROSYN) 500 mg tablet, Take 1 tablet (500 mg total) by mouth 2 (two) times a day with meals, Disp: 14 tablet, Rfl: 0   baclofen 10 mg tablet, By mouth take 1 tablet 3 times daily or as directed, Disp: 90 tablet, Rfl: 2    Objective:    Blood pressure 138/81, pulse 82, temperature 97 6 °F (36 4 °C), resp  rate 18, height 5' 8" (1 727 m), weight 122 kg (270 lb)  Physical Exam  BMI 41 05  Head normocephalic  Eyes nonicteric  Very, very small posterior oropharyngeal aperture  No audible head or anterior neck bruits  Lungs clear to auscultation  Rhythm regular  GI (abdomen) soft nontender with bowel sounds present  No significant lower extremity edema  Neurological Exam  Alert  Pleasantly and appropriately conversational   Fully oriented  No dysarthria  Unremarkable spontaneous gait  Able to tandem walk  Romberg maneuver performed unremarkably  Cranial Nerves:   I: Olfactory sense intact bilaterally  II: Visual fields full to confrontation  Pupils equal, round, reactive to light with normal accomodation  Fundus: with bilaterally marginated discs  III,IV,VI: Extraocular muscles EOMI, no nystagmus  V: Masseter and pterygoid strength  Sensation in the V1 through V3 distributions intact to pinprick and light touch bilaterally  VII: Face is symmetric with no weakness noted  VIII: Audition intact to finger rub bilaterally  IX/X: Uvula midline  Soft palate elevation symmetric  XI: Trapezius and SCM strength 5/5 bilaterally  XII: Tongue midline with no atrophy or fasciculations with appropriate movement  Accurate with finger-to-nose and heel-to-shin maneuvers bilaterally  Full symmetrical strength throughout the 4 extremities  No upper extremity drift  Sensory testing grossly intact to pin, position and vibration throughout  Muscle stretch reflexes bilaterally 1 throughout the upper extremities and bilaterally 1+ at the knees and bilaterally 2 at the ankles  Toe response downgoing bilaterally  ROS:    Review of Systems   Constitutional: Negative  Negative for appetite change and fever  HENT: Negative  Negative for hearing loss, tinnitus, trouble swallowing and voice change  Eyes: Negative  Negative for photophobia and pain  Respiratory: Negative  Negative for shortness of breath  Cardiovascular: Negative  Negative for palpitations  Gastrointestinal: Negative  Negative for nausea and vomiting  Endocrine: Negative  Negative for cold intolerance  Genitourinary: Negative  Negative for dysuria, frequency and urgency  Musculoskeletal: Negative  Negative for myalgias and neck pain  Skin: Negative  Negative for rash  Allergic/Immunologic: Negative  Neurological: Positive for dizziness, syncope (2 weeks ago), light-headedness, numbness (right ear goes down to neck and right shoulder at times) and headaches  Negative for tremors, seizures, facial asymmetry, speech difficulty and weakness  Hematological: Negative  Does not bruise/bleed easily  Psychiatric/Behavioral: Negative  Negative for confusion, hallucinations and sleep disturbance  I personally reviewed the ROS as entered by the medical assistant  *Please note this document was created using voice recognition software and may contain sound-alike word errors  *

## 2020-07-17 NOTE — ASSESSMENT & PLAN NOTE
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

## 2020-07-17 NOTE — ASSESSMENT & PLAN NOTE
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

## 2020-07-18 LAB
ATRIAL RATE: 90 BPM
P AXIS: 77 DEGREES
PR INTERVAL: 150 MS
QRS AXIS: 91 DEGREES
QRSD INTERVAL: 102 MS
QT INTERVAL: 374 MS
QTC INTERVAL: 457 MS
T WAVE AXIS: 56 DEGREES
VENTRICULAR RATE: 90 BPM

## 2020-07-18 PROCEDURE — 93010 ELECTROCARDIOGRAM REPORT: CPT | Performed by: INTERNAL MEDICINE

## 2020-07-20 ENCOUNTER — TELEPHONE (OUTPATIENT)
Dept: FAMILY MEDICINE CLINIC | Facility: CLINIC | Age: 50
End: 2020-07-20

## 2020-07-20 ENCOUNTER — TELEPHONE (OUTPATIENT)
Dept: NEUROLOGY | Facility: CLINIC | Age: 50
End: 2020-07-20

## 2020-07-20 NOTE — TELEPHONE ENCOUNTER
Patient calling in stating he is supposed to see a neurorugeon  Made him aware that a referral was placed and they should reach out to him to schedule  Gave patient neurosx phone # and transferred call to neurosx for patient to schedule

## 2020-07-20 NOTE — TELEPHONE ENCOUNTER
This is Stephanie's patient  He was sent to neurology and had his consult last week  The neurologist said he wants Batool Jamesmihaela to see a neurosurgeon but did not give him any info  He was given an order when he went to the ER, but it doesn't have any specific provider on it  Who should he see?

## 2020-07-21 ENCOUNTER — HOSPITAL ENCOUNTER (OUTPATIENT)
Dept: NON INVASIVE DIAGNOSTICS | Age: 50
Discharge: HOME | End: 2020-07-21
Payer: COMMERCIAL

## 2020-07-21 ENCOUNTER — TELEPHONE (OUTPATIENT)
Dept: NEUROLOGY | Facility: CLINIC | Age: 50
End: 2020-07-21

## 2020-07-21 DIAGNOSIS — R55 NEAR SYNCOPE: ICD-10-CM

## 2020-07-21 DIAGNOSIS — G44.52 NEW DAILY PERSISTENT HEADACHE: Primary | ICD-10-CM

## 2020-07-21 PROCEDURE — 93226 XTRNL ECG REC<48 HR SCAN A/R: CPT

## 2020-07-21 PROCEDURE — 93225 XTRNL ECG REC<48 HRS REC: CPT

## 2020-07-21 RX ORDER — ACETAMINOPHEN, ASPIRIN AND CAFFEINE 250; 250; 65 MG/1; MG/1; MG/1
1 TABLET, FILM COATED ORAL EVERY 6 HOURS PRN
Qty: 7 TABLET | Refills: 0 | Status: SHIPPED | OUTPATIENT
Start: 2020-07-21 | End: 2020-08-04

## 2020-07-21 NOTE — TELEPHONE ENCOUNTER
Patient calling in stating that his head / pressure feels unchanged since appt on 7/17  HE has been taking Baclofen 10mg tab, 1 tab TID, but feels that this is not helpful for him at all  He has also tried naproxen but that didn't help either  Wondering if there is another medication that he could try   Please advise    [phone]  ok to leave a detailed message

## 2020-07-21 NOTE — TELEPHONE ENCOUNTER
Patient calling about message below  Informed patient that Dr Richard Ventura was out of the office and has a covering physician that is also treating patients  Informed patient that if pain becomes unbearable too seek treatment in the ED  States understanding  Will wait to hear from our office

## 2020-07-22 ENCOUNTER — TELEPHONE (OUTPATIENT)
Dept: NEUROLOGY | Facility: CLINIC | Age: 50
End: 2020-07-22

## 2020-07-22 NOTE — TELEPHONE ENCOUNTER
Covering for Dr Vitaly Bush  I see Dr Vitaly Bush saw him for the first time on 7/17/20  It has only been four days since then  Patient was to try the baclofen plan for two weeks before giving an update  The clinic instructions were to take the 10mg qHS for three days before advancing to 10mg BID  The script instructions however says just to take it 10mg TID so I'm not sure which is correct  The note did not say what he has tried previously on  Given he is thought to have a tension type headache and maybe even triggered by his sleep apnea, therapies with caffeine, acetaminophen and aspirin combo are superior as an acute treatment to any one of them alone  Script for this , 7 pills, sent to pharmacy if interested  Bringing this to Dr Kayli Helms attention

## 2020-07-22 NOTE — TELEPHONE ENCOUNTER
Received request for records from [facility]  Scanned request into media and faxed to [phone]  Also included with request is Attending Physician's Statement  Attached to this encounter for review    If willing to complete form please advise so charges can be dropped

## 2020-07-22 NOTE — TELEPHONE ENCOUNTER
Patient calling and made aware of message  States that he will give the new medication a try and will continue to take baclofen for at least 2 weeks  Did confirm that patient is taking medication tid because those are the instructions that are on the bottle  He will call the office to update us  Does say that the ED is not an option at the moment because he is out of work and it will cost him $500 if he would go

## 2020-07-23 ENCOUNTER — CONSULT (OUTPATIENT)
Dept: NEUROSURGERY | Facility: CLINIC | Age: 50
End: 2020-07-23
Payer: COMMERCIAL

## 2020-07-23 VITALS
DIASTOLIC BLOOD PRESSURE: 100 MMHG | RESPIRATION RATE: 16 BRPM | HEART RATE: 99 BPM | HEIGHT: 68 IN | WEIGHT: 270 LBS | SYSTOLIC BLOOD PRESSURE: 152 MMHG | BODY MASS INDEX: 40.92 KG/M2 | TEMPERATURE: 98.6 F

## 2020-07-23 DIAGNOSIS — R90.89 ABNORMAL BRAIN MRI: ICD-10-CM

## 2020-07-23 DIAGNOSIS — G44.52 NEW DAILY PERSISTENT HEADACHE: Primary | ICD-10-CM

## 2020-07-23 PROCEDURE — 93227 XTRNL ECG REC<48 HR R&I: CPT | Performed by: INTERNAL MEDICINE

## 2020-07-23 PROCEDURE — 99243 OFF/OP CNSLTJ NEW/EST LOW 30: CPT | Performed by: PHYSICIAN ASSISTANT

## 2020-07-23 NOTE — LETTER
2020     Brain MARILIA Hernandez  [address] [facility][1], [address] [place] [address]    Patient: Jesse Barbosa   YOB: 1970   Date of Visit: 2020       Dear Dr Allen Salvador: Thank you for referring Nina Cotton to me for evaluation  Below are my notes for this consultation  If you have questions, please do not hesitate to call me  I look forward to following your patient along with you  Sincerely,        Alicia Dumont PA-C        CC: MD Karthik Vargas MD Dawna Slay, PA-C  2020  9:44 AM  Sign at close encounter  Patient ID: Jesse Barbosa is a 52 y o  male  Diagnoses and all orders for this visit:    New daily persistent headache  -     MRI cervical spine with and without contrast; Future  -     MRI thoracic spine with and without contrast; Future    Abnormal brain MRI  -     Ambulatory referral to Neurosurgery  -     MRI cervical spine with and without contrast; Future  -     MRI thoracic spine with and without contrast; Future          Assessment/Plan:    Very pleasant 77-year-old male, referred by PCP, Dr Wendi Sanchez to evaluate MRI brain, cerebellar tonsillar ectopy  The patient had a history of new onset of headaches approximately 1 month ago, he reports these are present every day, all day long, and involve his entire head  He is unaware of any event traumatic are otherwise preceded the onset of these headaches  As part of his ongoing evaluation he underwent MRI of the brain 20, this study was carefully reviewed in detail, the radiologist reports 5 mm of cerebellar ectopia below the foramen magnum, on review there is no excessive crowding, and this finding is felt to be within normal limits and not meet the typical standards for significant cut Chiari malformation, in the portion of the cervical spine which was visible, there was no evidence of syrinx (syringomyelia) noted      On questioning the patient denies tussive headaches, that is specifically associated with cough, sneeze, Valsalva maneuver, bending and lifting, they are not posterior headaches, they are not associated with exertion  He also notes that occasionally with a cough or a sneeze he will have ("a brief episode of passing out") likely a syncopal episode,  This is also new with his headache onset  He otherwise denies muscle weakness in either the upper or lower extremities, denies any radicular pain in the upper or lower extremities  Denies double vision or blurred vision  Denies numbness and tingling in the hands or the feet or any change in hand dexterity (no reported myelopathic symptoms)    Denies bladder issues, continence, incomplete emptying, urgency or frequency  Denies swallowing difficulties  He reports he has had cardiac imaging and has pending follow-up with cardiology to review the results of these studies  He has consultation pending with sleep medicine to rule out obstructive sleep apnea  On examination today he is awake, alert, oriented x3, motor exam of the upper and lower extremities is 5 x 5 for power, reflexes; triceps, biceps and brachioradialis as well as patella and Achilles are intact, symmetric, +2  [place] was negative, there was no ankle clonus Sensation sharp dull and vibratory were intact throughout, rapid alternating movements are intact, finger-nose is intact, Romberg was negative, sharpend Romberg was also negative  There is no pronator drift, cranial nerves are grossly intact  At this juncture further follow-up is advised after MRI cervical spine and MRI thoracic spine to rule out a skipped or missed syrinx/syringomyelia  Return to Neurosurgery after imaging for further management recommendations  Continue to follow with Dr Destiny Macdonald, Neurology per his planned testing  Keep planned consultation with sleep medicine  Keep follow-up with cardiology      These findings, impressions  and recommendations are reviewed in great detail with the patient, he expressed understanding and agreement, his questions were answered completely and to his satisfaction  Return in about 6 weeks (around 9/3/2020) for To review MRI cervical and thoracic spine  Chief Complaint  Chronic daily headache, review MRI findings    HPI       The following portions of the patient's history were reviewed and updated as appropriate: allergies, current medications, past family history, past medical history, past social history and past surgical history  Review of Systems   Constitutional: Negative  HENT: Negative  Eyes: Negative for visual disturbance  Eye pain: with HA  Respiratory: Negative  Cardiovascular: Negative  Gastrointestinal: Negative  Endocrine: Negative  Genitourinary: Negative  Musculoskeletal: Negative  Skin: Negative  Allergic/Immunologic: Negative  Neurological: Positive for dizziness, syncope (with coughing or sneezing), light-headedness, numbness (behind right ear running down to shoulder) and headaches (all over,started one month ago  Constant ever since)  Hematological: Negative  Objective:    Physical Exam   Constitutional: He is oriented to person, place, and time  He appears well-developed and well-nourished  Morbidly obese male, BMI 41  HENT:   Head: Normocephalic and atraumatic  Eyes: Pupils are equal, round, and reactive to light  EOM are normal    Cardiovascular: Normal rate  Pulmonary/Chest: Effort normal and breath sounds normal    Neurological: He is alert and oriented to person, place, and time  Skin: Skin is warm and dry  Psychiatric: He has a normal mood and affect  Vitals reviewed  Neurologic Exam     Mental Status   Oriented to person, place, and time  Cranial Nerves     CN III, IV, VI   Pupils are equal, round, and reactive to light    Extraocular motions are normal           MRI BRAIN WITH AND WITHOUT CONTRAST 7/8/20     INDICATION: R55: Syncope and collapse  R51: Headache      COMPARISON:  None      TECHNIQUE:  Sagittal T1, axial T2, axial FLAIR, axial T1, axial [place], axial diffusion  Sagittal, axial T1 postcontrast   Axial bravo postcontrast with coronal reconstructions           IV Contrast:  12 mL of gadobutrol injection (MULTI-DOSE)      IMAGE QUALITY:   Diagnostic      FINDINGS:     BRAIN PARENCHYMA:  There is no discrete mass, mass effect or midline shift  There is no intracranial hemorrhage  Normal posterior fossa  Diffusion imaging is unremarkable        There are no white matter changes in the cerebral hemispheres      Postcontrast imaging of the brain demonstrates no abnormal enhancement      VENTRICLES:  Normal for the patient's age      SELLA AND PITUITARY GLAND:  Normal      ORBITS:  Normal      PARANASAL SINUSES:  Normal      VASCULATURE:  Evaluation of the major intracranial vasculature demonstrates appropriate flow voids      CALVARIUM AND SKULL BASE:  There is cerebellar ectopia measuring up to 5 mm      EXTRACRANIAL SOFT TISSUES:  Unremarkable      IMPRESSION:     No mass effect, acute intracranial hemorrhage or evidence of recent infarction  No abnormal parenchymal or leptomeningeal enhancement identified      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

## 2020-07-23 NOTE — PROGRESS NOTES
Patient ID: Phillip Bueno is a 52 y o  male  Diagnoses and all orders for this visit:    New daily persistent headache  -     MRI cervical spine with and without contrast; Future  -     MRI thoracic spine with and without contrast; Future    Abnormal brain MRI  -     Ambulatory referral to Neurosurgery  -     MRI cervical spine with and without contrast; Future  -     MRI thoracic spine with and without contrast; Future          Assessment/Plan:    Very pleasant 57-year-old male, referred by PCP, Dr Jose Crespo to evaluate MRI brain, cerebellar tonsillar ectopy  The patient had a history of new onset of headaches approximately 1 month ago, he reports these are present every day, all day long, and involve his entire head  He is unaware of any event traumatic are otherwise preceded the onset of these headaches  As part of his ongoing evaluation he underwent MRI of the brain 7/8/20, this study was carefully reviewed in detail, the radiologist reports 5 mm of cerebellar ectopia below the foramen magnum, on review there is no excessive crowding, and this finding is felt to be within normal limits and not meet the typical standards for significant cut Chiari malformation, in the portion of the cervical spine which was visible, there was no evidence of syrinx (syringomyelia) noted  On questioning the patient denies tussive headaches, that is specifically associated with cough, sneeze, Valsalva maneuver, bending and lifting, they are not posterior headaches, they are not associated with exertion  He also notes that occasionally with a cough or a sneeze he will have ("a brief episode of passing out") likely a syncopal episode,  This is also new with his headache onset  He otherwise denies muscle weakness in either the upper or lower extremities, denies any radicular pain in the upper or lower extremities  Denies double vision or blurred vision      Denies numbness and tingling in the hands or the feet or any change in hand dexterity (no reported myelopathic symptoms)    Denies bladder issues, continence, incomplete emptying, urgency or frequency  Denies swallowing difficulties  He reports he has had cardiac imaging and has pending follow-up with cardiology to review the results of these studies  He has consultation pending with sleep medicine to rule out obstructive sleep apnea  On examination today he is awake, alert, oriented x3, motor exam of the upper and lower extremities is 5 x 5 for power, reflexes; triceps, biceps and brachioradialis as well as patella and Achilles are intact, symmetric, +2  Raiza Silverio was negative, there was no ankle clonus Sensation sharp dull and vibratory were intact throughout, rapid alternating movements are intact, finger-nose is intact, Romberg was negative, sharpend Romberg was also negative  There is no pronator drift, cranial nerves are grossly intact  At this juncture further follow-up is advised after MRI cervical spine and MRI thoracic spine to rule out a skipped or missed syrinx/syringomyelia  Return to Neurosurgery after imaging for further management recommendations  Continue to follow with Dr Benjamin Sky, Neurology per his planned testing  Keep planned consultation with sleep medicine  Keep follow-up with cardiology  These findings, impressions  and recommendations are reviewed in great detail with the patient, he expressed understanding and agreement, his questions were answered completely and to his satisfaction  Return in about 6 weeks (around 9/3/2020) for To review MRI cervical and thoracic spine  Chief Complaint  Chronic daily headache, review MRI findings    HPI       The following portions of the patient's history were reviewed and updated as appropriate: allergies, current medications, past family history, past medical history, past social history and past surgical history  Review of Systems   Constitutional: Negative  HENT: Negative  Eyes: Negative for visual disturbance  Eye pain: with HA  Respiratory: Negative  Cardiovascular: Negative  Gastrointestinal: Negative  Endocrine: Negative  Genitourinary: Negative  Musculoskeletal: Negative  Skin: Negative  Allergic/Immunologic: Negative  Neurological: Positive for dizziness, syncope (with coughing or sneezing), light-headedness, numbness (behind right ear running down to shoulder) and headaches (all over,started one month ago  Constant ever since)  Hematological: Negative  Objective:    Physical Exam   Constitutional: He is oriented to person, place, and time  He appears well-developed and well-nourished  Morbidly obese male, BMI 41  HENT:   Head: Normocephalic and atraumatic  Eyes: Pupils are equal, round, and reactive to light  EOM are normal    Cardiovascular: Normal rate  Pulmonary/Chest: Effort normal and breath sounds normal    Neurological: He is alert and oriented to person, place, and time  Skin: Skin is warm and dry  Psychiatric: He has a normal mood and affect  Vitals reviewed  Neurologic Exam     Mental Status   Oriented to person, place, and time  Cranial Nerves     CN III, IV, VI   Pupils are equal, round, and reactive to light  Extraocular motions are normal           MRI BRAIN WITH AND WITHOUT CONTRAST   7/8/20     INDICATION: R55: Syncope and collapse  R51: Headache      COMPARISON:  None      TECHNIQUE:  Sagittal T1, axial T2, axial FLAIR, axial T1, axial [place], axial diffusion  Sagittal, axial T1 postcontrast   Axial bravo postcontrast with coronal reconstructions           IV Contrast:  12 mL of gadobutrol injection (MULTI-DOSE)      IMAGE QUALITY:   Diagnostic      FINDINGS:     BRAIN PARENCHYMA:  There is no discrete mass, mass effect or midline shift  There is no intracranial hemorrhage  Normal posterior fossa    Diffusion imaging is unremarkable        There are no white matter changes in the cerebral hemispheres      Postcontrast imaging of the brain demonstrates no abnormal enhancement      VENTRICLES:  Normal for the patient's age      SELLA AND PITUITARY GLAND:  Normal      ORBITS:  Normal      PARANASAL SINUSES:  Normal      VASCULATURE:  Evaluation of the major intracranial vasculature demonstrates appropriate flow voids      CALVARIUM AND SKULL BASE:  There is cerebellar ectopia measuring up to 5 mm      EXTRACRANIAL SOFT TISSUES:  Unremarkable      IMPRESSION:     No mass effect, acute intracranial hemorrhage or evidence of recent infarction  No abnormal parenchymal or leptomeningeal enhancement identified      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

## 2020-07-23 NOTE — PATIENT INSTRUCTIONS
Per seed for MRI cervical spine as discussed  Proceed for MRI thoracic spine as discussed  Return to Neurosurgery after imaging for further management recommendations  Continue to follow with Neurology per Dr Jennifer Dominguez protocol  Proceed for testing as advised by Dr Jennifer Dominguez

## 2020-07-24 ENCOUNTER — HOSPITAL ENCOUNTER (EMERGENCY)
Facility: HOSPITAL | Age: 50
Discharge: HOME | End: 2020-07-24
Attending: EMERGENCY MEDICINE | Admitting: EMERGENCY MEDICINE
Payer: COMMERCIAL

## 2020-07-24 ENCOUNTER — TELEPHONE (OUTPATIENT)
Dept: NEUROSURGERY | Facility: CLINIC | Age: 50
End: 2020-07-24

## 2020-07-24 ENCOUNTER — TELEPHONE (OUTPATIENT)
Dept: FAMILY MEDICINE CLINIC | Facility: CLINIC | Age: 50
End: 2020-07-24

## 2020-07-24 VITALS
WEIGHT: 268 LBS | DIASTOLIC BLOOD PRESSURE: 69 MMHG | SYSTOLIC BLOOD PRESSURE: 130 MMHG | BODY MASS INDEX: 40.75 KG/M2 | RESPIRATION RATE: 18 BRPM | HEART RATE: 92 BPM | TEMPERATURE: 97.5 F | OXYGEN SATURATION: 96 %

## 2020-07-24 DIAGNOSIS — R51.9 HEADACHE: Primary | ICD-10-CM

## 2020-07-24 PROCEDURE — 96361 HYDRATE IV INFUSION ADD-ON: CPT

## 2020-07-24 PROCEDURE — 96374 THER/PROPH/DIAG INJ IV PUSH: CPT

## 2020-07-24 PROCEDURE — 99283 EMERGENCY DEPT VISIT LOW MDM: CPT

## 2020-07-24 PROCEDURE — 96375 TX/PRO/DX INJ NEW DRUG ADDON: CPT

## 2020-07-24 PROCEDURE — 99284 EMERGENCY DEPT VISIT MOD MDM: CPT | Performed by: EMERGENCY MEDICINE

## 2020-07-24 RX ORDER — BACLOFEN 20 MG/1
20 TABLET ORAL 3 TIMES DAILY
Qty: 21 TABLET | Refills: 0 | Status: SHIPPED | OUTPATIENT
Start: 2020-07-24 | End: 2020-08-04

## 2020-07-24 RX ORDER — KETOROLAC TROMETHAMINE 30 MG/ML
15 INJECTION, SOLUTION INTRAMUSCULAR; INTRAVENOUS ONCE
Status: COMPLETED | OUTPATIENT
Start: 2020-07-24 | End: 2020-07-24

## 2020-07-24 RX ORDER — METOCLOPRAMIDE HYDROCHLORIDE 5 MG/ML
10 INJECTION INTRAMUSCULAR; INTRAVENOUS ONCE
Status: COMPLETED | OUTPATIENT
Start: 2020-07-24 | End: 2020-07-24

## 2020-07-24 RX ADMIN — SODIUM CHLORIDE 1000 ML: 0.9 INJECTION, SOLUTION INTRAVENOUS at 12:06

## 2020-07-24 RX ADMIN — KETOROLAC TROMETHAMINE 15 MG: 30 INJECTION, SOLUTION INTRAMUSCULAR at 12:05

## 2020-07-24 RX ADMIN — METOCLOPRAMIDE 10 MG: 5 INJECTION, SOLUTION INTRAMUSCULAR; INTRAVENOUS at 12:05

## 2020-07-24 NOTE — DISCHARGE INSTRUCTIONS
You have been seen for headache  You should return to the ED if you develop persistent vomiting, headache that is different from her normal headaches, or other worsening symptoms  Follow up with your primary care doctor to discuss continuing Excedrin for headache  Follow-up with your neurologist to discuss past often does and if he should continue a higher dose  Follow-up with Neurosurgery to discuss the MRI findings from the prior visit  Continue to take baclofen as directed for headaches  Take 20 mg 3 times a day  Go for sleep study to diagnose sleep apnea

## 2020-07-24 NOTE — ED ATTENDING ATTESTATION
7/24/2020  I, Justen Galeano MD, saw and evaluated the patient  I have discussed the patient with the resident/non-physician practitioner and agree with the resident's/non-physician practitioner's findings, Plan of Care, and MDM as documented in the resident's/non-physician practitioner's note, except where noted  All available labs and Radiology studies were reviewed  I was present for key portions of any procedure(s) performed by the resident/non-physician practitioner and I was immediately available to provide assistance  At this point I agree with the current assessment done in the Emergency Department  I have conducted an independent evaluation of this patient a history and physical is as follows:    ED Course     82-year-old male presenting to the emergency department for evaluation of headache  Patient reports that he has been getting chronic daily headaches that begin sometimes in the morning, gradually worsened throughout the day, getting more severe at night  Patient reports no headache when he is sleeping  Patient has had evaluation including MRI, has seen a neurologist, was started on Excedrin and baclofen, and had an appointment with a neurosurgeon  Patient denies any fever, change in his vision, difficulty speaking or swallowing, chest pain, cough, shortness of breath, abdominal pain  Ten systems reviewed negative except as noted in the history of present illness  The patient is resting comfortably on a stretcher in no acute respiratory distress  The patient appears nontoxic  HEENT reveals moist mucous membranes  Head is normocephalic and atraumatic  Conjunctiva and sclera are normal  Neck is nontender and supple with full range of motion to flexion, extension, lateral rotation  No meningismus appreciated  No masses are appreciated  Lungs are clear to auscultation bilaterally without any wheezes, rales or rhonchi   Heart is regular rate and rhythm without any murmurs, rubs or gallops  Abdomen is soft and nontender without any rebound or guarding  Extremities appear grossly normal without any significant arthropathy  Patient is awake, alert, and oriented x3  The patient has normal interaction  Cranial nerves 2-12 are intact  Motor is 5 out of 5 bilateral upper lower extremities  No pronator drift  Normal finger-to-nose  Assessment and plan:  63-year-old male presenting to the emergency department for evaluation of headache  Patient has had previous imaging  Headache does not seem to be consistent with subarachnoid hemorrhage or meningitis/encephalitis  Patient has had previous blood work to look for Lyme disease  History is not consistent with elevated carboxyhemoglobin  Would consider the possibility of pseudotumor cerebral a, and will recommend that the patient be referred to Ophthalmology to undergo a dilated exam to evaluate for papilledema  Patient will be treated for headache in the emergency department            Critical Care Time  Procedures

## 2020-07-24 NOTE — TELEPHONE ENCOUNTER
Received call from [address] from patient's PCP office informing me that patient had called their office complaining of severe headache with associated nausea and vomiting  They are sending him to [facility]  I advised them that I would let our first call PA know in case we are consulted    I sent a TT to [facility], [place]
normal...

## 2020-07-24 NOTE — ED PROVIDER NOTES
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  Patient has since followed up with Neurology, who started him on baclofen and Excedrin for pain  They recommended he see neurosurgery  Patient saw neurosurgery PA yesterday, who he was not happy with  Patient thinks moving forward he would like a 2nd opinion from a different neurosurgeon  The neurosurgery PA did not seem to want to operate and did not have any recommendations for different pain control  Patient had an episode of vomiting yesterday and he called his neurologist who recommended he come to the ED  Patient states he only vomited 1 time and it was nonbloody and non-bilious  Otherwise patient states that headaches have been normal for him  They usually start suddenly every morning sometime after he wakes up and last throughout the day  They are bilateral and diffusely spread over his head  There is some minor associated photophobia, but no phonophobia  The pain is not resolved by baclofen or Excedrin  Patient would like pain control today as he does not think his pain is being controlled well with baclofen and Excedrin  He has had no nausea or vomiting today  Patient states that he is also supposed to be evaluated in the next few weeks for sleep apnea with a sleep study  Patient believes he does have sleep apnea as his family members have found him not breathing while sleeping  Patient has had a couple episodes of syncope after coughing or sneezing throughout the past month    He has worn a Holter monitor and had an EEG and is following with cardiology for this  Prior to Admission Medications   Prescriptions Last Dose Informant Patient Reported? Taking?   aspirin-acetaminophen-caffeine ([facility]) 250-250-65 MG per tablet   No No   Sig: Take 1 tablet by mouth every 6 (six) hours as needed for headaches   baclofen 10 mg tablet   No No   Sig: By mouth take 1 tablet 3 times daily or as directed   losartan (Cozaar) 50 mg tablet   No No   Sig: Take 1 tablet (50 mg total) by mouth daily   mometasone (ELOCON) 0 1 % cream   No No   Sig: Apply topically daily   Patient not taking: Reported on 7/23/2020   naproxen (NAPROSYN) 500 mg tablet   No No   Sig: Take 1 tablet (500 mg total) by mouth 2 (two) times a day with meals   Patient not taking: Reported on 7/23/2020      Facility-Administered Medications: None       Past Medical History:   Diagnosis Date    Head ache        Past Surgical History:   Procedure Laterality Date    CARPAL TUNNEL RELEASE Left        Family History   Problem Relation Age of Onset    No Known Problems Mother     No Known Problems Father     Cancer Maternal Grandmother     Cancer Maternal Grandfather     Substance Abuse Brother     Substance Abuse Brother     Alcohol abuse Neg Hx     Mental illness Neg Hx      I have reviewed and agree with the history as documented  E-Cigarette/Vaping    E-Cigarette Use Never User      E-Cigarette/Vaping Substances     Social History     Tobacco Use    Smoking status: Current Every Day Smoker     Packs/day: 0 50    Smokeless tobacco: Never Used   Substance Use Topics    Alcohol use: Yes     Comment: Everyday     Drug use: No        Review of Systems   Constitutional: Negative for chills, fatigue and fever  HENT: Negative for congestion, ear pain, hearing loss, sinus pain, sore throat and tinnitus  Respiratory: Negative for cough, chest tightness and shortness of breath  Cardiovascular: Negative for chest pain and palpitations     Gastrointestinal: Positive for nausea and vomiting  Negative for abdominal pain and diarrhea  Genitourinary: Negative for difficulty urinating  Musculoskeletal: Negative for arthralgias, back pain, gait problem, neck pain and neck stiffness  Skin: Negative for color change and pallor  Neurological: Positive for headaches  Negative for dizziness, weakness and light-headedness  All other systems reviewed and are negative  Physical Exam  ED Triage Vitals [07/24/20 1056]   Temperature Pulse Respirations Blood Pressure SpO2   97 5 °F (36 4 °C) (!) 113 20 168/89 97 %      Temp Source Heart Rate Source Patient Position - Orthostatic VS BP Location FiO2 (%)   Tympanic Monitor Lying Left arm --      Pain Score       Worst Possible Pain             Orthostatic Vital Signs  Vitals:    07/24/20 1056 07/24/20 1255 07/24/20 1430   BP: 168/89 151/68 130/69   Pulse: (!) 113 95 92   Patient Position - Orthostatic VS: Lying Lying        Physical Exam   Constitutional: He is oriented to person, place, and time  He appears well-developed and well-nourished  HENT:   Head: Normocephalic and atraumatic  Eyes: Conjunctivae and EOM are normal  Right eye exhibits no discharge  Left eye exhibits no discharge  Neck: Normal range of motion  Neck supple  Cardiovascular: Normal rate and regular rhythm  No murmur heard  Pulmonary/Chest: Effort normal and breath sounds normal  No respiratory distress  He has no wheezes  He has no rales  Abdominal: Soft  Musculoskeletal: Normal range of motion  He exhibits no edema  Neurological: He is alert and oriented to person, place, and time  No cranial nerve deficit or sensory deficit  He exhibits normal muscle tone  Strength 5/5 in all extremities   Skin: Skin is warm and dry         ED Medications  Medications   sodium chloride 0 9 % bolus 1,000 mL (0 mL Intravenous Stopped 7/24/20 1439)   ketorolac (TORADOL) injection 15 mg (15 mg Intravenous Given 7/24/20 1205)   metoclopramide (REGLAN) injection 10 mg (10 mg Intravenous Given 7/24/20 1205)       Diagnostic Studies  Results Reviewed     None                 No orders to display         Procedures  Procedures      ED Course  ED Course as of Jul 24 1455   Fri Jul 24, 2020   1223 Pt is feeling better after receiving fluids, toradol, and reglan  1233 I spoke with pt's neurologist, Dr Marisela Laura  He recommended that moving forward pt can increase his baclofen  He believes the medication still needs some time to take full effect  He does not recommend adding other medications to his daily regimen at this time  He believes pt's sleep apnea is playing a major roll in his headaches and pt will start to feel better once he gets this diagnosed and gets a CPAP  US AUDIT      Most Recent Value   Initial Alcohol Screen: US AUDIT-C    1  How often do you have a drink containing alcohol? 6 Filed at: 07/24/2020 1105   2  How many drinks containing alcohol do you have on a typical day you are drinking? 4 Filed at: 07/24/2020 1105   3a  Male UNDER 65: How often do you have five or more drinks on one occasion? 6 Filed at: 07/24/2020 1105   3b  FEMALE Any Age, or MALE 65+: How often do you have 4 or more drinks on one occassion? 6 Filed at: 07/24/2020 1105   Audit-C Score  (!) 22 Filed at: 07/24/2020 1105                  AMARI/DAST-10      Most Recent Value   How many times in the past year have you    Used an illegal drug or used a prescription medication for non-medical reasons? Never Filed at: 07/24/2020 1105                              MDM  Number of Diagnoses or Management Options  Headache: established and improving  Diagnosis management comments: 32-year-old male with past medical history of hypertension presents with headache  Patient has been having headaches for past month  He saw neurology and has been started on baclofen and Excedrin    Patient states that headaches have not been getting any better and neurosurgery did not have any recommendations time  Since patient has been following with Neurology and Neurosurgery and has had prior imaging, we will treat his symptoms today with medication  I will reach out to his neurologist to discuss changing medications moving forward  Risk of Complications, Morbidity, and/or Mortality  Presenting problems: moderate  Diagnostic procedures: moderate  Management options: moderate    Patient Progress  Patient progress: improved    Patient is feeling better after receiving medication  I spoke with Dr Osei Bealuieu, his neurologist, who recommended we increase the dose of baclofen  He also recommended that we tell the patient the baclofen will still take while to reach maximum effect  Dr Tessy Browning and I agree that patient will likely feel better when he is diagnosed with sleep apnea and can get set up with a CPAP machine  Patient was told all of this and he hopes that the higher dose of baclofen can get him feeling better until he can get tested for sleep apnea  Patient was told to follow-up with Dr Tessy Browning to discuss if the higher dose of baclofen is working  Disposition  Final diagnoses:   Headache     Time reflects when diagnosis was documented in both MDM as applicable and the Disposition within this note     Time User Action Codes Description Comment    7/24/2020  2:44 PM Mike, Jessica0 Josephine Mckayvard Headache       ED Disposition     ED Disposition Condition Date/Time Comment    Discharge Good Fri Jul 24, 2020  2:44 PM Mario Flick discharge to home/self care              Follow-up Information     Follow up With Specialties Details Why Contact Info    William Rosales PA-C Family Medicine, Physician Assistant In 2 days to discuss whether or not to continue [id number] [address] [address] [address], MD Neurology Go to  to discuss baclofen and other possible treatment options [address] [id number] [place] [address] [address] [facility]  [phone] Patient's Medications   Discharge Prescriptions    BACLOFEN 20 MG TABLET    Take 1 tablet (20 mg total) by mouth 3 (three) times a day for 7 days       Start Date: 7/24/2020 End Date: 7/31/2020       Order Dose: 20 mg       Quantity: 21 tablet    Refills: 0     No discharge procedures on file  PDMP Review     None           ED Provider  Attending physically available and evaluated Jani Wheeler  DAKSHA managed the patient along with the ED Attending      Electronically Signed by         Herminio Gonzalez DO  07/24/20 [address] [place] [address] Baljinder Henriquez MD  07/24/20 1573

## 2020-07-24 NOTE — TELEPHONE ENCOUNTER
Form printed and placed in clinical bin  Please review form under media and advise if you are agreeable to completing form        Will also need to have charges enter and contact pt

## 2020-07-28 NOTE — TELEPHONE ENCOUNTER
Dropped charges for 1 Page Form-$15 dated 7/28/20  Contacted patient, collected payment over phone via [address] [place] [address]  Offered to mail copy of receipt, patient declined      Please notify patient when form is complete and fax form back to [facility]

## 2020-07-28 NOTE — TELEPHONE ENCOUNTER
Completed form to the best of my ability  Called patient  He has been out of work since 7/16/20  Dr Mallory Mueller - please complete # 22 and #24  Emailed form to [employer] - once completed and signed, fax to [phone], scan into chart and call patient   TY

## 2020-07-31 ENCOUNTER — HOSPITAL ENCOUNTER (OUTPATIENT)
Dept: MRI IMAGING | Facility: HOSPITAL | Age: 50
Discharge: HOME | End: 2020-07-31
Payer: COMMERCIAL

## 2020-07-31 DIAGNOSIS — G44.52 NEW DAILY PERSISTENT HEADACHE: ICD-10-CM

## 2020-07-31 DIAGNOSIS — R90.89 ABNORMAL BRAIN MRI: ICD-10-CM

## 2020-07-31 PROCEDURE — A9585 GADOBUTROL INJECTION: HCPCS | Performed by: PHYSICIAN ASSISTANT

## 2020-07-31 PROCEDURE — 72156 MRI NECK SPINE W/O & W/DYE: CPT

## 2020-07-31 PROCEDURE — 72157 MRI CHEST SPINE W/O & W/DYE: CPT

## 2020-07-31 RX ADMIN — GADOBUTROL 12 ML: 604.72 INJECTION INTRAVENOUS at 16:05

## 2020-08-03 ENCOUNTER — TELEPHONE (OUTPATIENT)
Dept: NEUROSURGERY | Facility: CLINIC | Age: 50
End: 2020-08-03

## 2020-08-04 ENCOUNTER — TELEPHONE (OUTPATIENT)
Dept: NEUROLOGY | Facility: CLINIC | Age: 50
End: 2020-08-04

## 2020-08-04 ENCOUNTER — OFFICE VISIT (OUTPATIENT)
Dept: NEUROSURGERY | Facility: CLINIC | Age: 50
End: 2020-08-04
Payer: COMMERCIAL

## 2020-08-04 VITALS
HEART RATE: 105 BPM | DIASTOLIC BLOOD PRESSURE: 88 MMHG | SYSTOLIC BLOOD PRESSURE: 183 MMHG | WEIGHT: 278 LBS | BODY MASS INDEX: 42.13 KG/M2 | RESPIRATION RATE: 16 BRPM | TEMPERATURE: 100 F | HEIGHT: 68 IN

## 2020-08-04 DIAGNOSIS — S14.0XXA CONCUSSION AND EDEMA OF CERVICAL SPINAL CORD, INITIAL ENCOUNTER (HCC): Primary | ICD-10-CM

## 2020-08-04 DIAGNOSIS — G44.52 NEW DAILY PERSISTENT HEADACHE: ICD-10-CM

## 2020-08-04 DIAGNOSIS — G95.19 EDEMA OF SPINAL CORD (HCC): ICD-10-CM

## 2020-08-04 DIAGNOSIS — Q04.8 CEREBELLAR TONSILLAR ECTOPIA (HCC): ICD-10-CM

## 2020-08-04 PROCEDURE — 3077F SYST BP >= 140 MM HG: CPT | Performed by: PHYSICIAN ASSISTANT

## 2020-08-04 PROCEDURE — 3008F BODY MASS INDEX DOCD: CPT | Performed by: PHYSICIAN ASSISTANT

## 2020-08-04 PROCEDURE — 99213 OFFICE O/P EST LOW 20 MIN: CPT | Performed by: PHYSICIAN ASSISTANT

## 2020-08-04 PROCEDURE — 3079F DIAST BP 80-89 MM HG: CPT | Performed by: PHYSICIAN ASSISTANT

## 2020-08-04 NOTE — ASSESSMENT & PLAN NOTE
Appreciate neurology input  Work-up in progress    Mri brain without colloid cyst or evidence of increased intracranial pressure

## 2020-08-04 NOTE — PROGRESS NOTES
Neurosurgery Office Note  Elissa Leonard 52 y o  male MRN: [id number]      Assessment/Plan     Cerebellar tonsillar ectopia [facility])  MRI brain imaging completed for one month of diffuse daily headaches  · Denies tussive headaches    Imaging:  · MRI brain July 8, 2020:  5 mm of cerebellar ectopia below the foramen magnum with no excessive crowding  Plan:  · MRI finding felt to be within normal limits  · No neurosurgical intervention or follow-up indicated    Edema of spinal cord [facility])  MRI cervical thoracic spine completed to evaluate for skip or missed syrinx/syringomyelia  · Denies any symptoms of myelopathy    Imaging:  · MRI cervical spine with without 7/31/20:  Abnormal edema in the upper cervical cord extending from the mid portion of C2 to C4-5 disc space  No clear cord expansion or abnormal enhancement  Cervical degenerative changes C3 through C7 with which joint hypertrophy  Moderate right foraminal stenosis C3-4, C4-5 and C6-7  At C6-7 there is a broad-based central disc herniation effacing the ventral CSF space and Flattening the cord without cord edema at this level  · MRI thoracic spine with without contrast 7/31/20:  C107 an year old disc bulge and dried hypertrophy with mild to moderate canal stenosis and foraminal narrowing  Minimal disc bulge at T11-12 with mild canal stenosis and no nerve impingement  Plan:  · Continue to monitor neurological exam   Discussed symptoms of myelopathy  · MRI imaging reviewed in detail with patient  · Discuss possible etiology including B12 deficiency vs myelitis  Ordered B12 lab work  Denies any history of Lyme or varicella zoster  · Recommend ongoing follow-up with neurology as scheduled  · No neurosurgical intervention indicated  May follow-up on an as needed basis  Reviewed symptoms of Myelopathy for which he should follow-up if they were to develop       New daily persistent headache  · ongoing follow-up with neurology  · Possibly related to an underlying undiagnosed sleep apnea  Has sleep medicine appointment August 18th           Near syncope, recurrent  Appreciate neurology input  Work-up in progress    Mri brain without colloid cyst or evidence of increased intracranial pressure  Diagnoses and all orders for this visit:    Concussion and edema of cervical spinal cord, initial encounter ([facility] [address] )  -     Methylmalonic acid, serum; Future  -     Vitamin B12; Future    New daily persistent headache    Cerebellar tonsillar ectopia (HCC)    Edema of spinal cord ([facility] [address] )            CHIEF COMPLAINT    Chief Complaint   Patient presents with    Follow-up     F/U TO MRI THORACIC, CERVICAL SPINE 7/31/2020       HISTORY    History of Present Illness     52 y o   male with past medical history significant for hypertension who presents for follow-up of MRI cervical and thoracic spine  Patient was originally seen in consultation for referral of 5 mm cerebellar tonsillar ectopia demonstrated on MRI brain completed for new onset headache  Patient states approximately in mid June he developed bifrontal headaches which originally improved with over-the-counter nonsteroidal anti-inflammatory medications  Headaches became progressive, severe, daily, diffuse and poorly controlled  He recently trailed baclofen prescribed by Neurology without any benefit  He admits to occasional neck pain and stiffness  Denies any radicular pain, numbness, tingling or weakness  Denies gait dysfunction  Denies bowel/bladder dysfunction  Denies fine motor difficulties  MRI cervical and thoracic spine completed to evaluate for syrinx for which he presents today to review  REVIEW OF SYSTEMS    Review of Systems   Constitutional: Negative  HENT: Negative  Eyes: Positive for pain (with severe HA)  Respiratory: Negative  Cardiovascular: Negative  Gastrointestinal: Negative  Endocrine: Negative  Genitourinary: Negative      Musculoskeletal: Positive for neck pain (occasional) and neck stiffness (due to pain)  Skin: Negative  Allergic/Immunologic: Negative  Neurological: Positive for dizziness, syncope (with coughing or sneezing), light-headedness, numbness (behind right ear running down to shoulder) and headaches (constant severe headaches from the time he wakes up until he goes to bed)  Hematological: Negative  Psychiatric/Behavioral: Positive for sleep disturbance (2-3x @ night; he states that he has an upcoming sleep study)  All other systems reviewed and are negative  ROS personally reviewed  Meds/Allergies     Current Outpatient Medications   Medication Sig Dispense Refill    baclofen 10 mg tablet By mouth take 1 tablet 3 times daily or as directed 90 tablet 2    losartan (Cozaar) 50 mg tablet Take 1 tablet (50 mg total) by mouth daily 90 tablet 3    mometasone (ELOCON) 0 1 % cream Apply topically daily (Patient taking differently: Apply topically as needed ) 45 g 0     No current facility-administered medications for this visit  Allergies   Allergen Reactions    Lisinopril Cough       PAST HISTORY    Past Medical History:   Diagnosis Date    Head ache     Hypertension        Past Surgical History:   Procedure Laterality Date    CARPAL TUNNEL RELEASE Left        Social History     Tobacco Use    Smoking status: Current Every Day Smoker     Packs/day: 0 50    Smokeless tobacco: Never Used   Substance Use Topics    Alcohol use: Yes     Comment: Everyday     Drug use: No       Family History   Problem Relation Age of Onset    No Known Problems Mother     No Known Problems Father     Cancer Maternal Grandmother     Cancer Maternal Grandfather     Substance Abuse Brother     Substance Abuse Brother     Alcohol abuse Neg Hx     Mental illness Neg Hx          Above history personally reviewed  EXAM    Vitals:Blood pressure (!) 183/88, pulse 105, temperature 100 °F (37 8 °C), temperature source Probe, resp   rate 16, height 5' 8", weight 126 kg (278 lb)  ,Body mass index is 42 27 kg/m²  Physical Exam   Constitutional: He is oriented to person, place, and time  He appears well-developed  No distress  HENT:   Head: Normocephalic and atraumatic  Right Ear: External ear normal    Left Ear: External ear normal    Nose: Nose normal    Eyes: Pupils are equal, round, and reactive to light  Conjunctivae and EOM are normal  Right eye exhibits no discharge  Left eye exhibits no discharge  No scleral icterus  Neck: Normal range of motion  Neck supple  No muscular tenderness present  Cardiovascular: Normal rate  Pulmonary/Chest: Effort normal  No respiratory distress  Abdominal: Soft  He exhibits no distension  Musculoskeletal:         General: No tenderness  Neurological: He is alert and oriented to person, place, and time  He has normal strength  Gait normal    Reflex Scores:       Bicep reflexes are 2+ on the right side and 2+ on the left side  Brachioradialis reflexes are 2+ on the right side and 2+ on the left side  Patellar reflexes are 2+ on the right side and 1+ on the left side  Skin: Skin is warm and dry  Psychiatric: His speech is normal and behavior is normal  Mood, judgment and thought content normal    Vitals reviewed  Neurologic Exam     Mental Status   Oriented to person, place, and time  Follows 3 step commands  Attention: normal  Concentration: normal    Speech: speech is normal   Level of consciousness: alert  Knowledge: good  Normal comprehension  Cranial Nerves     CN III, IV, VI   Pupils are equal, round, and reactive to light  Extraocular motions are normal    Right pupil: Shape: regular  Left pupil: Shape: regular  Nystagmus: none   Upgaze: normal  Conjugate gaze: present    CN VII   Facial expression full, symmetric       CN VIII   Hearing: intact    CN XI   Right trapezius strength: normal  Left trapezius strength: normal    CN XII   Tongue: not atrophic  Fasciculations: absent  Tongue deviation: none    Motor Exam   Muscle bulk: normal  Overall muscle tone: normal    Strength   Strength 5/5 throughout  Sensory Exam   Light touch normal    Vibration normal    Pinprick normal      Gait, Coordination, and Reflexes     Gait  Gait: normal    Tremor   Resting tremor: absent  Intention tremor: absent  Action tremor: absent    Reflexes   Right brachioradialis: 2+  Left brachioradialis: 2+  Right biceps: 2+  Left biceps: 2+  Right patellar: 2+  Left patellar: 1+  Right Spaulding: absent  Left Spaulding: absent  Right ankle clonus: absent  Left ankle clonus: absent        MEDICAL DECISION MAKING    Imaging Studies:     Xr Chest Portable    Result Date: 7/15/2020  Narrative: CHEST INDICATION:   Acute Resp Failure  COMPARISON:  1/31/2015 EXAM PERFORMED/VIEWS:  XR CHEST PORTABLE FINDINGS: Cardiomediastinal silhouette appears unremarkable  The lungs are clear  No pneumothorax or pleural effusion  Osseous structures appear within normal limits for patient age  Impression: No acute cardiopulmonary disease  Workstation performed: [id number]     Mri Brain W Wo Contrast    Result Date: 7/8/2020  Narrative: MRI BRAIN WITH AND WITHOUT CONTRAST INDICATION: R55: Syncope and collapse R51: Headache  COMPARISON:  None  TECHNIQUE: Sagittal T1, axial T2, axial FLAIR, axial T1, axial [place], axial diffusion  Sagittal, axial T1 postcontrast   Axial bravo postcontrast with coronal reconstructions  IV Contrast:  12 mL of gadobutrol injection (MULTI-DOSE)  IMAGE QUALITY:   Diagnostic  FINDINGS: BRAIN PARENCHYMA:  There is no discrete mass, mass effect or midline shift  There is no intracranial hemorrhage  Normal posterior fossa  Diffusion imaging is unremarkable  There are no white matter changes in the cerebral hemispheres  Postcontrast imaging of the brain demonstrates no abnormal enhancement  VENTRICLES:  Normal for the patient's age   SELLA AND PITUITARY GLAND:  Normal  ORBITS: Normal  PARANASAL SINUSES:  Normal  VASCULATURE:  Evaluation of the major intracranial vasculature demonstrates appropriate flow voids  CALVARIUM AND SKULL BASE:  There is cerebellar ectopia measuring up to 5 mm  EXTRACRANIAL SOFT TISSUES:  Unremarkable  Impression: No mass effect, acute intracranial hemorrhage or evidence of recent infarction  No abnormal parenchymal or leptomeningeal enhancement identified  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  Workstation performed: [id number]     Mri Cervical Spine With And Without Contrast    Result Date: 8/3/2020  Narrative: MRI CERVICAL SPINE WITH AND WITHOUT CONTRAST INDICATION: R90 89: Other abnormal findings on diagnostic imaging of central nervous system G44 52: New daily persistent headache (ndph)  COMPARISON:  None  TECHNIQUE:  Sagittal T1, sagittal T2, sagittal inversion recovery, axial 2D merge and axial T2  Sagittal T1 and axial T1 postcontrast   IV Contrast:  12 mL of Gadobutrol injection (SINGLE-DOSE) IMAGE QUALITY:  Diagnostic  FINDINGS: ALIGNMENT:  Straightening of the normal cervical lordosis with slight kyphotic reversal at the level of C6-7  No subluxation  No compression fracture  No scoliosis  MARROW SIGNAL:  Mild endplate marrow degenerative change C4-5  CERVICAL AND VISUALIZED UPPER THORACIC CORD:  There is abnormal edema present within the cervical cord extending from the midportion of C2 to the level of the C4-5 disc space best seen series 2 image 8  This abnormal signal is located within the central  aspect of the cord with no associated enhancement  There is cord flattening at the level of the C6-7 level as a result of degenerative disc disease  No focal cord edema noted within the lower cervical or upper thoracic cord  PREVERTEBRAL AND PARASPINAL SOFT TISSUES:  Normal  VISUALIZED POSTERIOR FOSSA:  Mild cerebellar tonsillar ectopia with tonsils extending slightly below the foramen magnum    However, the tonsils are not pointed or compressed and there is no mass effect upon the cervical medullary junction  There is CSF peripheral to the cerebellar tonsils  CERVICAL DISC SPACES: C2-C3:  Normal  C3-C4:  There is loss of disc height with mild annular bulging and mild right uncinate joint hypertrophic change  Mild canal stenosis without left foraminal narrowing  There is moderate right foraminal narrowing  C4-C5:  There is loss of disc height  Annular bulging with slight paramedian endplate degenerative change and slight right uncinate joint degenerative change  There is no canal stenosis  Moderate right foraminal narrowing  C5-C6:  Slight loss of disc height with a small central disc herniation  Mild canal stenosis without foraminal narrowing  C6-C7:  Disc desiccation and loss of disc height with annular bulging and a moderate broad-based central disc protrusion effacing the ventral CSF space and slightly flattening the cord  Moderate right and mild left foraminal narrowing  C7-T1:  Mild annular bulging  There is no canal stenosis  Mild right foraminal narrowing secondary to slight foraminal degenerative disc disease  UPPER THORACIC DISC SPACES:  Normal  POSTCONTRAST IMAGING: No abnormal enhancement  Impression: There is abnormal edema present within the upper cervical cord extending from the midportion of C2 to the level of the C4-5 disc space best seen on series 2 image 8  No clear cord expansion or abnormal enhancement  Findings are nonspecific and despite lack of enhancement differential considerations would include transverse myelitis  Cervical degenerative disc disease C3-4 through C6-7 with annular bulging and uncinate joint hypertrophic change  Moderate right foraminal narrowing is seen at the C3-4, C4-5 and C6-7 levels  At C6-7 there is a broad-based central disc herniation effacing the ventral CSF space and flattening the cord  No focal cord edema at this level   Workstation performed: [id number]     [facility] Spine With And Without Contrast    Result Date: 8/3/2020  Narrative: MRI THORACIC SPINE WITH AND WITHOUT CONTRAST INDICATION: R90 89: Other abnormal findings on diagnostic imaging of central nervous system G44 52: New daily persistent headache (ndph)  COMPARISON:  None  TECHNIQUE:  Sagittal T1, sagittal T2, sagittal inversion recovery, axial T2,  axial 2D MERGE  Sagittal and axial T1 postcontrast  IV Contrast:  12 mL of Gadobutrol injection (SINGLE-DOSE) IMAGE QUALITY:  Diagnostic  FINDINGS: ALIGNMENT:  Straightening of the normal thoracic kyphosis  No subluxation  No scoliosis  No compression fracture  MARROW SIGNAL:  Normal marrow signal is identified within the visualized bony structures  No discrete marrow lesion  There is a small chronic Schmorl's node within the inferior endplate of D97  THORACIC CORD:  Normal signal within the thoracic cord  PREVERTEBRAL AND PARASPINAL SOFT TISSUES:   Normal  THORACIC DEGENERATIVE CHANGE:  At the C6-C7 level there is annular bulging and uncinate joint hypertrophic degenerative change resulting in mild to moderate canal stenosis and foraminal narrowing  Slight annular bulging at T11-12  Mild canal stenosis without foraminal narrowing  POSTCONTRAST:  No abnormal enhancement  Impression: C6-7 annular bulging and uncinate joint hypertrophic change at the superior margin of this examination with mild to moderate canal stenosis and foraminal narrowing  At the T11-12 level there is slight annular bulging with mild canal stenosis but no nerve impingement  Workstation performed: [id number]       I have personally reviewed pertinent reports     and I have personally reviewed pertinent films in PACS

## 2020-08-04 NOTE — LETTER
August 4, 2020     [address], [address] [id number] [facility]    Patient: Suzi Singh   YOB: 1970   Date of Visit: 8/4/2020       Dear Dr Adan Must: Thank you for referring Jamison Barr to me for evaluation  Below are my notes for this consultation  If you have questions, please do not hesitate to call me  I look forward to following your patient along with you  Sincerely,        Hans Vasquez PA-C        CC: MD Hans Forte PA-C  8/4/2020 10:36 AM  [facility]  Neurosurgery Office Note  Suzi Singh 52 y o  male MRN: [id number]      Assessment/Plan     Cerebellar tonsillar ectopia [facility])  MRI brain imaging completed for one month of diffuse daily headaches  · Denies tussive headaches    Imaging:  · MRI brain July 8, 2020:  5 mm of cerebellar ectopia below the foramen magnum with no excessive crowding  Plan:  · MRI finding felt to be within normal limits  · No neurosurgical intervention or follow-up indicated    Edema of spinal cord [facility])  MRI cervical thoracic spine completed to evaluate for skip or missed syrinx/syringomyelia  · Denies any symptoms of myelopathy    Imaging:  · MRI cervical spine with without 7/31/20:  Abnormal edema in the upper cervical cord extending from the mid portion of C2 to C4-5 disc space  No clear cord expansion or abnormal enhancement  Cervical degenerative changes C3 through C7 with which joint hypertrophy  Moderate right foraminal stenosis C3-4, C4-5 and C6-7  At C6-7 there is a broad-based central disc herniation effacing the ventral CSF space and Flattening the cord without cord edema at this level  · MRI thoracic spine with without contrast 7/31/20:  C107 an year old disc bulge and dried hypertrophy with mild to moderate canal stenosis and foraminal narrowing  Minimal disc bulge at T11-12 with mild canal stenosis and no nerve impingement      Plan:  · Continue to monitor neurological exam   Discussed symptoms of myelopathy  · MRI imaging reviewed in detail with patient  · Discuss possible etiology including B12 deficiency vs myelitis  Ordered B12 lab work  Denies any history of Lyme or varicella zoster  · Recommend ongoing follow-up with neurology as scheduled  · No neurosurgical intervention indicated  May follow-up on an as needed basis  Reviewed symptoms of Myelopathy for which he should follow-up if they were to develop  New daily persistent headache  · ongoing follow-up with neurology  · Possibly related to an underlying nerve sleep apnea  Has sleep medicine appointment August 18th              Diagnoses and all orders for this visit:    Concussion and edema of cervical spinal cord, initial encounter ([facility] [address] )  -     Methylmalonic acid, serum; Future  -     Vitamin B12; Future            CHIEF COMPLAINT    Chief Complaint   Patient presents with    Follow-up     F/U TO MRI THORACIC, CERVICAL SPINE 7/31/2020       HISTORY    History of Present Illness     52 y o   male         REVIEW OF SYSTEMS    Review of Systems   Constitutional: Negative  HENT: Negative  Eyes: Positive for pain (with severe HA)  Respiratory: Negative  Cardiovascular: Negative  Gastrointestinal: Negative  Endocrine: Negative  Genitourinary: Negative  Musculoskeletal: Positive for neck pain (occasional) and neck stiffness (due to pain)  Skin: Negative  Allergic/Immunologic: Negative  Neurological: Positive for dizziness, syncope (with coughing or sneezing), light-headedness, numbness (behind right ear running down to shoulder) and headaches (constant severe headaches from the time he wakes up until he goes to bed)  Hematological: Negative  Psychiatric/Behavioral: Positive for sleep disturbance (2-3x @ night; he states that he has an upcoming sleep study)  All other systems reviewed and are negative          Meds/Allergies     Current Outpatient Medications Medication Sig Dispense Refill    baclofen 10 mg tablet By mouth take 1 tablet 3 times daily or as directed 90 tablet 2    losartan (Cozaar) 50 mg tablet Take 1 tablet (50 mg total) by mouth daily 90 tablet 3    mometasone (ELOCON) 0 1 % cream Apply topically daily (Patient taking differently: Apply topically as needed ) 45 g 0     No current facility-administered medications for this visit  Allergies   Allergen Reactions    Lisinopril Cough       PAST HISTORY    Past Medical History:   Diagnosis Date    Head ache     Hypertension        Past Surgical History:   Procedure Laterality Date    CARPAL TUNNEL RELEASE Left        Social History     Tobacco Use    Smoking status: Current Every Day Smoker     Packs/day: 0 50    Smokeless tobacco: Never Used   Substance Use Topics    Alcohol use: Yes     Comment: Everyday     Drug use: No       Family History   Problem Relation Age of Onset    No Known Problems Mother     No Known Problems Father     Cancer Maternal Grandmother     Cancer Maternal Grandfather     Substance Abuse Brother     Substance Abuse Brother     Alcohol abuse Neg Hx     Mental illness Neg Hx          Above history personally reviewed  EXAM    Vitals:Blood pressure (!) 183/88, pulse 105, temperature 100 °F (37 8 °C), temperature source Probe, resp  rate 16, height 5' 8", weight 126 kg (278 lb)  ,Body mass index is 42 27 kg/m²  Physical Exam   Constitutional: He is oriented to person, place, and time  He appears well-developed  No distress  HENT:   Head: Normocephalic and atraumatic  Right Ear: External ear normal    Left Ear: External ear normal    Nose: Nose normal    Eyes: Pupils are equal, round, and reactive to light  Conjunctivae and EOM are normal  Right eye exhibits no discharge  Left eye exhibits no discharge  No scleral icterus  Neck: Normal range of motion  Neck supple  No muscular tenderness present  Cardiovascular: Normal rate     Pulmonary/Chest: Effort normal  No respiratory distress  Abdominal: Soft  He exhibits no distension  Musculoskeletal:         General: No tenderness  Neurological: He is alert and oriented to person, place, and time  He has normal strength  Gait normal    Reflex Scores:       Bicep reflexes are 2+ on the right side and 2+ on the left side  Brachioradialis reflexes are 2+ on the right side and 2+ on the left side  Patellar reflexes are 2+ on the right side and 1+ on the left side  Skin: Skin is warm and dry  Psychiatric: His speech is normal and behavior is normal  Mood, judgment and thought content normal    Vitals reviewed  Neurologic Exam     Mental Status   Oriented to person, place, and time  Follows 3 step commands  Attention: normal  Concentration: normal    Speech: speech is normal   Level of consciousness: alert  Knowledge: good  Normal comprehension  Cranial Nerves     CN III, IV, VI   Pupils are equal, round, and reactive to light  Extraocular motions are normal    Right pupil: Shape: regular  Left pupil: Shape: regular  Nystagmus: none   Upgaze: normal  Conjugate gaze: present    CN VII   Facial expression full, symmetric  CN VIII   Hearing: intact    CN XI   Right trapezius strength: normal  Left trapezius strength: normal    CN XII   Tongue: not atrophic  Fasciculations: absent  Tongue deviation: none    Motor Exam   Muscle bulk: normal  Overall muscle tone: normal    Strength   Strength 5/5 throughout       Sensory Exam   Light touch normal    Vibration normal    Pinprick normal      Gait, Coordination, and Reflexes     Gait  Gait: normal    Tremor   Resting tremor: absent  Intention tremor: absent  Action tremor: absent    Reflexes   Right brachioradialis: 2+  Left brachioradialis: 2+  Right biceps: 2+  Left biceps: 2+  Right patellar: 2+  Left patellar: 1+  Right Spaulding: absent  Left Spaulding: absent  Right ankle clonus: absent  Left ankle clonus: absent        MEDICAL DECISION MAKING    Imaging Studies:     Xr Chest Portable    Result Date: 7/15/2020  Narrative: CHEST INDICATION:   Acute Resp Failure  COMPARISON:  1/31/2015 EXAM PERFORMED/VIEWS:  XR CHEST PORTABLE FINDINGS: Cardiomediastinal silhouette appears unremarkable  The lungs are clear  No pneumothorax or pleural effusion  Osseous structures appear within normal limits for patient age  Impression: No acute cardiopulmonary disease  Workstation performed: [id number]     Mri Brain W Wo Contrast    Result Date: 7/8/2020  Narrative: MRI BRAIN WITH AND WITHOUT CONTRAST INDICATION: R55: Syncope and collapse R51: Headache  COMPARISON:  None  TECHNIQUE: Sagittal T1, axial T2, axial FLAIR, axial T1, axial [place], axial diffusion  Sagittal, axial T1 postcontrast   Axial bravo postcontrast with coronal reconstructions  IV Contrast:  12 mL of gadobutrol injection (MULTI-DOSE)  IMAGE QUALITY:   Diagnostic  FINDINGS: BRAIN PARENCHYMA:  There is no discrete mass, mass effect or midline shift  There is no intracranial hemorrhage  Normal posterior fossa  Diffusion imaging is unremarkable  There are no white matter changes in the cerebral hemispheres  Postcontrast imaging of the brain demonstrates no abnormal enhancement  VENTRICLES:  Normal for the patient's age  SELLA AND PITUITARY GLAND:  Normal  ORBITS:  Normal  PARANASAL SINUSES:  Normal  VASCULATURE:  Evaluation of the major intracranial vasculature demonstrates appropriate flow voids  CALVARIUM AND SKULL BASE:  There is cerebellar ectopia measuring up to 5 mm  EXTRACRANIAL SOFT TISSUES:  Unremarkable  Impression: No mass effect, acute intracranial hemorrhage or evidence of recent infarction  No abnormal parenchymal or leptomeningeal enhancement identified  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   Workstation performed: [id number]     Mri Cervical Spine With And Without Contrast    Result Date: 8/3/2020  Narrative: MRI CERVICAL SPINE WITH AND WITHOUT CONTRAST INDICATION: R90 89: Other abnormal findings on diagnostic imaging of central nervous system G44 52: New daily persistent headache (ndph)  COMPARISON:  None  TECHNIQUE:  Sagittal T1, sagittal T2, sagittal inversion recovery, axial 2D merge and axial T2  Sagittal T1 and axial T1 postcontrast   IV Contrast:  12 mL of Gadobutrol injection (SINGLE-DOSE) IMAGE QUALITY:  Diagnostic  FINDINGS: ALIGNMENT:  Straightening of the normal cervical lordosis with slight kyphotic reversal at the level of C6-7  No subluxation  No compression fracture  No scoliosis  MARROW SIGNAL:  Mild endplate marrow degenerative change C4-5  CERVICAL AND VISUALIZED UPPER THORACIC CORD:  There is abnormal edema present within the cervical cord extending from the midportion of C2 to the level of the C4-5 disc space best seen series 2 image 8  This abnormal signal is located within the central  aspect of the cord with no associated enhancement  There is cord flattening at the level of the C6-7 level as a result of degenerative disc disease  No focal cord edema noted within the lower cervical or upper thoracic cord  PREVERTEBRAL AND PARASPINAL SOFT TISSUES:  Normal  VISUALIZED POSTERIOR FOSSA:  Mild cerebellar tonsillar ectopia with tonsils extending slightly below the foramen magnum  However, the tonsils are not pointed or compressed and there is no mass effect upon the cervical medullary junction  There is CSF peripheral to the cerebellar tonsils  CERVICAL DISC SPACES: C2-C3:  Normal  C3-C4:  There is loss of disc height with mild annular bulging and mild right uncinate joint hypertrophic change  Mild canal stenosis without left foraminal narrowing  There is moderate right foraminal narrowing  C4-C5:  There is loss of disc height  Annular bulging with slight paramedian endplate degenerative change and slight right uncinate joint degenerative change  There is no canal stenosis  Moderate right foraminal narrowing   C5-C6:  Slight loss of disc height with a small central disc herniation  Mild canal stenosis without foraminal narrowing  C6-C7:  Disc desiccation and loss of disc height with annular bulging and a moderate broad-based central disc protrusion effacing the ventral CSF space and slightly flattening the cord  Moderate right and mild left foraminal narrowing  C7-T1:  Mild annular bulging  There is no canal stenosis  Mild right foraminal narrowing secondary to slight foraminal degenerative disc disease  UPPER THORACIC DISC SPACES:  Normal  POSTCONTRAST IMAGING: No abnormal enhancement  Impression: There is abnormal edema present within the upper cervical cord extending from the midportion of C2 to the level of the C4-5 disc space best seen on series 2 image 8  No clear cord expansion or abnormal enhancement  Findings are nonspecific and despite lack of enhancement differential considerations would include transverse myelitis  Cervical degenerative disc disease C3-4 through C6-7 with annular bulging and uncinate joint hypertrophic change  Moderate right foraminal narrowing is seen at the C3-4, C4-5 and C6-7 levels  At C6-7 there is a broad-based central disc herniation effacing the ventral CSF space and flattening the cord  No focal cord edema at this level  Workstation performed: [id number]     Mri Thoracic Spine With And Without Contrast    Result Date: 8/3/2020  Narrative: MRI THORACIC SPINE WITH AND WITHOUT CONTRAST INDICATION: R90 89: Other abnormal findings on diagnostic imaging of central nervous system G44 52: New daily persistent headache (ndph)  COMPARISON:  None  TECHNIQUE:  Sagittal T1, sagittal T2, sagittal inversion recovery, axial T2,  axial 2D MERGE  Sagittal and axial T1 postcontrast  IV Contrast:  12 mL of Gadobutrol injection (SINGLE-DOSE) IMAGE QUALITY:  Diagnostic  FINDINGS: ALIGNMENT:  Straightening of the normal thoracic kyphosis  No subluxation  No scoliosis  No compression fracture   MARROW SIGNAL: Normal marrow signal is identified within the visualized bony structures  No discrete marrow lesion  There is a small chronic Schmorl's node within the inferior endplate of R79  THORACIC CORD:  Normal signal within the thoracic cord  PREVERTEBRAL AND PARASPINAL SOFT TISSUES:   Normal  THORACIC DEGENERATIVE CHANGE:  At the C6-C7 level there is annular bulging and uncinate joint hypertrophic degenerative change resulting in mild to moderate canal stenosis and foraminal narrowing  Slight annular bulging at T11-12  Mild canal stenosis without foraminal narrowing  POSTCONTRAST:  No abnormal enhancement  Impression: C6-7 annular bulging and uncinate joint hypertrophic change at the superior margin of this examination with mild to moderate canal stenosis and foraminal narrowing  At the T11-12 level there is slight annular bulging with mild canal stenosis but no nerve impingement  Workstation performed: [id number]       I have personally reviewed pertinent reports  and I have personally reviewed pertinent films in PACS    Jael Fonseca  8/4/2020 10:37 AM  [facility]  Neurosurgery Office Note  Meghann Gil 52 y o  male MRN: [id number]      Assessment/Plan     Cerebellar tonsillar ectopia [facility])  MRI brain imaging completed for one month of diffuse daily headaches  · Denies tussive headaches    Imaging:  · MRI brain July 8, 2020:  5 mm of cerebellar ectopia below the foramen magnum with no excessive crowding  Plan:  · MRI finding felt to be within normal limits  · No neurosurgical intervention or follow-up indicated    Edema of spinal cord [facility])  MRI cervical thoracic spine completed to evaluate for skip or missed syrinx/syringomyelia  · Denies any symptoms of myelopathy    Imaging:  · MRI cervical spine with without 7/31/20:  Abnormal edema in the upper cervical cord extending from the mid portion of C2 to C4-5 disc space  No clear cord expansion or abnormal enhancement    Cervical degenerative changes C3 through C7 with which joint hypertrophy  Moderate right foraminal stenosis C3-4, C4-5 and C6-7  At C6-7 there is a broad-based central disc herniation effacing the ventral CSF space and Flattening the cord without cord edema at this level  · MRI thoracic spine with without contrast 7/31/20:  C107 an year old disc bulge and dried hypertrophy with mild to moderate canal stenosis and foraminal narrowing  Minimal disc bulge at T11-12 with mild canal stenosis and no nerve impingement  Plan:  · Continue to monitor neurological exam   Discussed symptoms of myelopathy  · MRI imaging reviewed in detail with patient  · Discuss possible etiology including B12 deficiency vs myelitis  Ordered B12 lab work  Denies any history of Lyme or varicella zoster  · Recommend ongoing follow-up with neurology as scheduled  · No neurosurgical intervention indicated  May follow-up on an as needed basis  Reviewed symptoms of Myelopathy for which he should follow-up if they were to develop  New daily persistent headache  · ongoing follow-up with neurology  · Possibly related to an underlying nerve sleep apnea  Has sleep medicine appointment August 18th              Diagnoses and all orders for this visit:    Concussion and edema of cervical spinal cord, initial encounter ([facility] [address] )  -     Methylmalonic acid, serum; Future  -     Vitamin B12; Future            CHIEF COMPLAINT    Chief Complaint   Patient presents with    Follow-up     F/U TO MRI THORACIC, CERVICAL SPINE 7/31/2020       HISTORY    History of Present Illness     52 y o   male         REVIEW OF SYSTEMS    Review of Systems   Constitutional: Negative  HENT: Negative  Eyes: Positive for pain (with severe HA)  Respiratory: Negative  Cardiovascular: Negative  Gastrointestinal: Negative  Endocrine: Negative  Genitourinary: Negative  Musculoskeletal: Positive for neck pain (occasional) and neck stiffness (due to pain)  Skin: Negative  Allergic/Immunologic: Negative  Neurological: Positive for dizziness, syncope (with coughing or sneezing), light-headedness, numbness (behind right ear running down to shoulder) and headaches (constant severe headaches from the time he wakes up until he goes to bed)  Hematological: Negative  Psychiatric/Behavioral: Positive for sleep disturbance (2-3x @ night; he states that he has an upcoming sleep study)  All other systems reviewed and are negative  Meds/Allergies     Current Outpatient Medications   Medication Sig Dispense Refill    baclofen 10 mg tablet By mouth take 1 tablet 3 times daily or as directed 90 tablet 2    losartan (Cozaar) 50 mg tablet Take 1 tablet (50 mg total) by mouth daily 90 tablet 3    mometasone (ELOCON) 0 1 % cream Apply topically daily (Patient taking differently: Apply topically as needed ) 45 g 0     No current facility-administered medications for this visit  Allergies   Allergen Reactions    Lisinopril Cough       PAST HISTORY    Past Medical History:   Diagnosis Date    Head ache     Hypertension        Past Surgical History:   Procedure Laterality Date    CARPAL TUNNEL RELEASE Left        Social History     Tobacco Use    Smoking status: Current Every Day Smoker     Packs/day: 0 50    Smokeless tobacco: Never Used   Substance Use Topics    Alcohol use: Yes     Comment: Everyday     Drug use: No       Family History   Problem Relation Age of Onset    No Known Problems Mother     No Known Problems Father     Cancer Maternal Grandmother     Cancer Maternal Grandfather     Substance Abuse Brother     Substance Abuse Brother     Alcohol abuse Neg Hx     Mental illness Neg Hx          Above history personally reviewed  EXAM    Vitals:Blood pressure (!) 183/88, pulse 105, temperature 100 °F (37 8 °C), temperature source Probe, resp  rate 16, height 5' 8", weight 126 kg (278 lb)  ,Body mass index is 42 27 kg/m²       Physical Exam Constitutional: He is oriented to person, place, and time  He appears well-developed  No distress  HENT:   Head: Normocephalic and atraumatic  Right Ear: External ear normal    Left Ear: External ear normal    Nose: Nose normal    Eyes: Pupils are equal, round, and reactive to light  Conjunctivae and EOM are normal  Right eye exhibits no discharge  Left eye exhibits no discharge  No scleral icterus  Neck: Normal range of motion  Neck supple  No muscular tenderness present  Cardiovascular: Normal rate  Pulmonary/Chest: Effort normal  No respiratory distress  Abdominal: Soft  He exhibits no distension  Musculoskeletal:         General: No tenderness  Neurological: He is alert and oriented to person, place, and time  He has normal strength  Gait normal    Reflex Scores:       Bicep reflexes are 2+ on the right side and 2+ on the left side  Brachioradialis reflexes are 2+ on the right side and 2+ on the left side  Patellar reflexes are 2+ on the right side and 1+ on the left side  Skin: Skin is warm and dry  Psychiatric: His speech is normal and behavior is normal  Mood, judgment and thought content normal    Vitals reviewed  Neurologic Exam     Mental Status   Oriented to person, place, and time  Follows 3 step commands  Attention: normal  Concentration: normal    Speech: speech is normal   Level of consciousness: alert  Knowledge: good  Normal comprehension  Cranial Nerves     CN III, IV, VI   Pupils are equal, round, and reactive to light  Extraocular motions are normal    Right pupil: Shape: regular  Left pupil: Shape: regular  Nystagmus: none   Upgaze: normal  Conjugate gaze: present    CN VII   Facial expression full, symmetric       CN VIII   Hearing: intact    CN XI   Right trapezius strength: normal  Left trapezius strength: normal    CN XII   Tongue: not atrophic  Fasciculations: absent  Tongue deviation: none    Motor Exam   Muscle bulk: normal  Overall muscle tone: normal    Strength   Strength 5/5 throughout  Sensory Exam   Light touch normal    Vibration normal    Pinprick normal      Gait, Coordination, and Reflexes     Gait  Gait: normal    Tremor   Resting tremor: absent  Intention tremor: absent  Action tremor: absent    Reflexes   Right brachioradialis: 2+  Left brachioradialis: 2+  Right biceps: 2+  Left biceps: 2+  Right patellar: 2+  Left patellar: 1+  Right Spaulding: absent  Left Spaulding: absent  Right ankle clonus: absent  Left ankle clonus: absent        MEDICAL DECISION MAKING    Imaging Studies:     Xr Chest Portable    Result Date: 7/15/2020  Narrative: CHEST INDICATION:   Acute Resp Failure  COMPARISON:  1/31/2015 EXAM PERFORMED/VIEWS:  XR CHEST PORTABLE FINDINGS: Cardiomediastinal silhouette appears unremarkable  The lungs are clear  No pneumothorax or pleural effusion  Osseous structures appear within normal limits for patient age  Impression: No acute cardiopulmonary disease  Workstation performed: [id number]     Mri Brain W Wo Contrast    Result Date: 7/8/2020  Narrative: MRI BRAIN WITH AND WITHOUT CONTRAST INDICATION: R55: Syncope and collapse R51: Headache  COMPARISON:  None  TECHNIQUE: Sagittal T1, axial T2, axial FLAIR, axial T1, axial [place], axial diffusion  Sagittal, axial T1 postcontrast   Axial bravo postcontrast with coronal reconstructions  IV Contrast:  12 mL of gadobutrol injection (MULTI-DOSE)  IMAGE QUALITY:   Diagnostic  FINDINGS: BRAIN PARENCHYMA:  There is no discrete mass, mass effect or midline shift  There is no intracranial hemorrhage  Normal posterior fossa  Diffusion imaging is unremarkable  There are no white matter changes in the cerebral hemispheres  Postcontrast imaging of the brain demonstrates no abnormal enhancement  VENTRICLES:  Normal for the patient's age   SELLA AND PITUITARY GLAND:  Normal  ORBITS:  Normal  PARANASAL SINUSES:  Normal  VASCULATURE:  Evaluation of the major intracranial vasculature demonstrates appropriate flow voids  CALVARIUM AND SKULL BASE:  There is cerebellar ectopia measuring up to 5 mm  EXTRACRANIAL SOFT TISSUES:  Unremarkable  Impression: No mass effect, acute intracranial hemorrhage or evidence of recent infarction  No abnormal parenchymal or leptomeningeal enhancement identified  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  Workstation performed: [id number]     Mri Cervical Spine With And Without Contrast    Result Date: 8/3/2020  Narrative: MRI CERVICAL SPINE WITH AND WITHOUT CONTRAST INDICATION: R90 89: Other abnormal findings on diagnostic imaging of central nervous system G44 52: New daily persistent headache (ndph)  COMPARISON:  None  TECHNIQUE:  Sagittal T1, sagittal T2, sagittal inversion recovery, axial 2D merge and axial T2  Sagittal T1 and axial T1 postcontrast   IV Contrast:  12 mL of Gadobutrol injection (SINGLE-DOSE) IMAGE QUALITY:  Diagnostic  FINDINGS: ALIGNMENT:  Straightening of the normal cervical lordosis with slight kyphotic reversal at the level of C6-7  No subluxation  No compression fracture  No scoliosis  MARROW SIGNAL:  Mild endplate marrow degenerative change C4-5  CERVICAL AND VISUALIZED UPPER THORACIC CORD:  There is abnormal edema present within the cervical cord extending from the midportion of C2 to the level of the C4-5 disc space best seen series 2 image 8  This abnormal signal is located within the central  aspect of the cord with no associated enhancement  There is cord flattening at the level of the C6-7 level as a result of degenerative disc disease  No focal cord edema noted within the lower cervical or upper thoracic cord  PREVERTEBRAL AND PARASPINAL SOFT TISSUES:  Normal  VISUALIZED POSTERIOR FOSSA:  Mild cerebellar tonsillar ectopia with tonsils extending slightly below the foramen magnum  However, the tonsils are not pointed or compressed and there is no mass effect upon the cervical medullary junction    There is CSF peripheral to the cerebellar tonsils  CERVICAL DISC SPACES: C2-C3:  Normal  C3-C4:  There is loss of disc height with mild annular bulging and mild right uncinate joint hypertrophic change  Mild canal stenosis without left foraminal narrowing  There is moderate right foraminal narrowing  C4-C5:  There is loss of disc height  Annular bulging with slight paramedian endplate degenerative change and slight right uncinate joint degenerative change  There is no canal stenosis  Moderate right foraminal narrowing  C5-C6:  Slight loss of disc height with a small central disc herniation  Mild canal stenosis without foraminal narrowing  C6-C7:  Disc desiccation and loss of disc height with annular bulging and a moderate broad-based central disc protrusion effacing the ventral CSF space and slightly flattening the cord  Moderate right and mild left foraminal narrowing  C7-T1:  Mild annular bulging  There is no canal stenosis  Mild right foraminal narrowing secondary to slight foraminal degenerative disc disease  UPPER THORACIC DISC SPACES:  Normal  POSTCONTRAST IMAGING: No abnormal enhancement  Impression: There is abnormal edema present within the upper cervical cord extending from the midportion of C2 to the level of the C4-5 disc space best seen on series 2 image 8  No clear cord expansion or abnormal enhancement  Findings are nonspecific and despite lack of enhancement differential considerations would include transverse myelitis  Cervical degenerative disc disease C3-4 through C6-7 with annular bulging and uncinate joint hypertrophic change  Moderate right foraminal narrowing is seen at the C3-4, C4-5 and C6-7 levels  At C6-7 there is a broad-based central disc herniation effacing the ventral CSF space and flattening the cord  No focal cord edema at this level   Workstation performed: [id number]     Mri Thoracic Spine With And Without Contrast    Result Date: 8/3/2020  Narrative: MRI THORACIC SPINE WITH AND WITHOUT CONTRAST INDICATION: R90 89: Other abnormal findings on diagnostic imaging of central nervous system G44 52: New daily persistent headache (ndph)  COMPARISON:  None  TECHNIQUE:  Sagittal T1, sagittal T2, sagittal inversion recovery, axial T2,  axial 2D MERGE  Sagittal and axial T1 postcontrast  IV Contrast:  12 mL of Gadobutrol injection (SINGLE-DOSE) IMAGE QUALITY:  Diagnostic  FINDINGS: ALIGNMENT:  Straightening of the normal thoracic kyphosis  No subluxation  No scoliosis  No compression fracture  MARROW SIGNAL:  Normal marrow signal is identified within the visualized bony structures  No discrete marrow lesion  There is a small chronic Schmorl's node within the inferior endplate of Y60  THORACIC CORD:  Normal signal within the thoracic cord  PREVERTEBRAL AND PARASPINAL SOFT TISSUES:   Normal  THORACIC DEGENERATIVE CHANGE:  At the C6-C7 level there is annular bulging and uncinate joint hypertrophic degenerative change resulting in mild to moderate canal stenosis and foraminal narrowing  Slight annular bulging at T11-12  Mild canal stenosis without foraminal narrowing  POSTCONTRAST:  No abnormal enhancement  Impression: C6-7 annular bulging and uncinate joint hypertrophic change at the superior margin of this examination with mild to moderate canal stenosis and foraminal narrowing  At the T11-12 level there is slight annular bulging with mild canal stenosis but no nerve impingement  Workstation performed: [id number]       I have personally reviewed pertinent reports     and I have personally reviewed pertinent films in PACS

## 2020-08-04 NOTE — ASSESSMENT & PLAN NOTE
· ongoing follow-up with neurology  · Possibly related to an underlying undiagnosed sleep apnea    Has sleep medicine appointment August 18th

## 2020-08-04 NOTE — ASSESSMENT & PLAN NOTE
MRI cervical thoracic spine completed to evaluate for skip or missed syrinx/syringomyelia  · Denies any symptoms of myelopathy    Imaging:  · MRI cervical spine with without 7/31/20:  Abnormal edema in the upper cervical cord extending from the mid portion of C2 to C4-5 disc space  No clear cord expansion or abnormal enhancement  Cervical degenerative changes C3 through C7 with which joint hypertrophy  Moderate right foraminal stenosis C3-4, C4-5 and C6-7  At C6-7 there is a broad-based central disc herniation effacing the ventral CSF space and Flattening the cord without cord edema at this level  · MRI thoracic spine with without contrast 7/31/20:  C107 an year old disc bulge and dried hypertrophy with mild to moderate canal stenosis and foraminal narrowing  Minimal disc bulge at T11-12 with mild canal stenosis and no nerve impingement  Plan:  · Continue to monitor neurological exam   Discussed symptoms of myelopathy  · MRI imaging reviewed in detail with patient  · Discuss possible etiology including B12 deficiency vs myelitis  Ordered B12 lab work  Denies any history of Lyme or varicella zoster  · Recommend ongoing follow-up with neurology as scheduled  · No neurosurgical intervention indicated  May follow-up on an as needed basis  Reviewed symptoms of Myelopathy for which he should follow-up if they were to develop

## 2020-08-04 NOTE — ASSESSMENT & PLAN NOTE
MRI brain imaging completed for one month of diffuse daily headaches  · Denies tussive headaches    Imaging:  · MRI brain July 8, 2020:  5 mm of cerebellar ectopia below the foramen magnum with no excessive crowding      Plan:  · MRI finding felt to be within normal limits  · No neurosurgical intervention or follow-up indicated

## 2020-08-05 ENCOUNTER — HOSPITAL ENCOUNTER (OUTPATIENT)
Dept: NEUROLOGY | Facility: HOSPITAL | Age: 50
Discharge: HOME | End: 2020-08-05
Attending: PSYCHIATRY & NEUROLOGY
Payer: COMMERCIAL

## 2020-08-05 DIAGNOSIS — R55 NEAR SYNCOPE: ICD-10-CM

## 2020-08-05 PROCEDURE — 95816 EEG AWAKE AND DROWSY: CPT

## 2020-08-05 PROCEDURE — 95816 EEG AWAKE AND DROWSY: CPT | Performed by: PSYCHIATRY & NEUROLOGY

## 2020-08-09 LAB
METHYLMALONATE SERPL-SCNC: 237 NMOL/L (ref 0–378)
SL AMB DISCLAIMER: NORMAL
VIT B12 SERPL-MCNC: 289 PG/ML (ref 232–1245)

## 2020-08-11 ENCOUNTER — OFFICE VISIT (OUTPATIENT)
Dept: FAMILY MEDICINE CLINIC | Facility: CLINIC | Age: 50
End: 2020-08-11
Payer: COMMERCIAL

## 2020-08-11 VITALS
BODY MASS INDEX: 41.56 KG/M2 | TEMPERATURE: 97.3 F | DIASTOLIC BLOOD PRESSURE: 82 MMHG | RESPIRATION RATE: 16 BRPM | SYSTOLIC BLOOD PRESSURE: 130 MMHG | HEART RATE: 83 BPM | WEIGHT: 280.6 LBS | HEIGHT: 69 IN

## 2020-08-11 DIAGNOSIS — R06.83 SNORING: ICD-10-CM

## 2020-08-11 DIAGNOSIS — E66.01 MORBID OBESITY WITH BMI OF 40.0-44.9, ADULT (HCC): ICD-10-CM

## 2020-08-11 DIAGNOSIS — F17.200 SMOKING: ICD-10-CM

## 2020-08-11 DIAGNOSIS — Q04.8 CEREBELLAR TONSILLAR ECTOPIA (HCC): ICD-10-CM

## 2020-08-11 DIAGNOSIS — I10 ESSENTIAL HYPERTENSION: Primary | ICD-10-CM

## 2020-08-11 DIAGNOSIS — G95.19 EDEMA OF SPINAL CORD (HCC): ICD-10-CM

## 2020-08-11 PROCEDURE — 99214 OFFICE O/P EST MOD 30 MIN: CPT | Performed by: PHYSICIAN ASSISTANT

## 2020-08-11 PROCEDURE — 3075F SYST BP GE 130 - 139MM HG: CPT | Performed by: PHYSICIAN ASSISTANT

## 2020-08-11 PROCEDURE — 3725F SCREEN DEPRESSION PERFORMED: CPT | Performed by: PHYSICIAN ASSISTANT

## 2020-08-11 PROCEDURE — 3008F BODY MASS INDEX DOCD: CPT | Performed by: PHYSICIAN ASSISTANT

## 2020-08-11 PROCEDURE — 3079F DIAST BP 80-89 MM HG: CPT | Performed by: PHYSICIAN ASSISTANT

## 2020-08-12 PROBLEM — E66.812 CLASS 2 SEVERE OBESITY DUE TO EXCESS CALORIES WITH SERIOUS COMORBIDITY IN ADULT (HCC): Status: RESOLVED | Noted: 2020-03-17 | Resolved: 2020-08-12

## 2020-08-12 PROBLEM — E66.01 CLASS 2 SEVERE OBESITY DUE TO EXCESS CALORIES WITH SERIOUS COMORBIDITY IN ADULT (HCC): Status: RESOLVED | Noted: 2020-03-17 | Resolved: 2020-08-12

## 2020-08-12 NOTE — PROGRESS NOTES
Assessment/Plan:    1  Essential hypertension    - well controlled, continue with losartan 50 mg, work on weight loss and quitting smoking! 2  Edema of spinal cord (HCC)    - under care neuro, reviewed B12 level at 289 although now low by lab values because it is under 300 advised to start a daily oral supplement of B12    3  Cerebellar tonsillar ectopia ([address] )    - under care neuro, cleared by neuro surg    4  Snoring    - appt with Dr Renan Dorantes 8/18/20 for sleep apnea evaluation, r/o contributing factor to headaches    5  Smoking    - work on quitting    6  Morbid obesity with BMI of 40 0-44 9, adult ([facility] [address] )  - encouraged patient to work on [employer], exercise  and adequate sleep for weight loss  Follow up if more help is needed    F/u as needed  F/u after neuro work up Sep/Oct    Subjective:   Chief Complaint   Patient presents with    Hypertension    Headache     not getting better      Patient ID: Ramirez Altamirano is a 52 y o  male  Patient here in follow up  Still dealing with chronic headaches and syncope, has been out of work now for the past month, see neuro and neuro surg  Neurosurg said they could not help, has follow up with neurology this month  MRI cervical spine showed from DDD and also edema of spinal cord, labs were done showing a B12 289 otherwise all normal  May proceed to spinal tap next  Has appointment with Dr Renan Dorantes to see if possible sleep apnea and if this is contributing to headaches on 8/18  If Neuro cannot figure out headaches next step is to see Dr Fermin Barrientos in September        The following portions of the patient's history were reviewed and updated as appropriate: allergies, current medications, past family history, past medical history, past social history, past surgical history and problem list     Past Medical History:   Diagnosis Date    Head ache     Hypertension      Past Surgical History:   Procedure Laterality Date    CARPAL TUNNEL RELEASE Left      Family History   Problem Relation Age of Onset    No Known Problems Mother     No Known Problems Father     Cancer Maternal Grandmother     Cancer Maternal Grandfather     Substance Abuse Brother     Substance Abuse Brother     Alcohol abuse Neg Hx     Mental illness Neg Hx      Social History     Socioeconomic History    Marital status: Single     Spouse name: Not on file    Number of children: Not on file    Years of education: Not on file    Highest education level: Not on file   Occupational History    Not on file   Social Needs    Financial resource strain: Not on file    Food insecurity     Worry: Not on file     Inability: Not on file   [employer] needs     Medical: Not on file     Non-medical: Not on file   Tobacco Use    Smoking status: Current Every Day Smoker     Packs/day: 0 50    Smokeless tobacco: Never Used   Substance and Sexual Activity    Alcohol use: Yes     Comment: Everyday     Drug use: No    Sexual activity: Yes     Partners: Female   Lifestyle    Physical activity     Days per week: Not on file     Minutes per session: Not on file    Stress: Not on file   Relationships    Social connections     Talks on phone: Not on file     Gets together: Not on file     Attends Hoahaoism service: Not on file     Active member of club or organization: Not on file     Attends meetings of clubs or organizations: Not on file     Relationship status: Not on file    Intimate partner violence     Fear of current or ex partner: Not on file     Emotionally abused: Not on file     Physically abused: Not on file     Forced sexual activity: Not on file   Other Topics Concern    Not on file   Social History Narrative    Not on file       Current Outpatient Medications:     baclofen 10 mg tablet, By mouth take 1 tablet 3 times daily or as directed, Disp: 90 tablet, Rfl: 2    losartan (Cozaar) 50 mg tablet, Take 1 tablet (50 mg total) by mouth daily, Disp: 90 tablet, Rfl: 3    mometasone (ELOCON) 0 1 % cream, Apply topically daily (Patient taking differently: Apply topically as needed ), Disp: 45 g, Rfl: 0    Review of Systems          Objective:    Vitals:    08/11/20 1705   BP: 130/82   BP Location: Left arm   Patient Position: Sitting   Cuff Size: Adult   Pulse: 83   Resp: 16   Temp: (!) 97 3 °F (36 3 °C)   Weight: 127 kg (280 lb 9 6 oz)   Height: 5' 8 5" (1 74 m)        Physical Exam  Constitutional:       Appearance: He is well-developed  Neck:      Musculoskeletal: Neck supple  Cardiovascular:      Rate and Rhythm: Normal rate and regular rhythm  Heart sounds: Normal heart sounds  Pulmonary:      Effort: Pulmonary effort is normal       Breath sounds: Normal breath sounds  Skin:     General: Skin is warm  Neurological:      Mental Status: He is alert and oriented to person, place, and time  Reviewed MRI and labs with patient today      BMI Counseling: Body mass index is 42 04 kg/m²  The BMI is above normal  Nutrition recommendations include reducing portion sizes and decreasing overall calorie intake  Exercise recommendations include moderate aerobic physical activity for 150 minutes/week

## 2020-08-18 ENCOUNTER — TELEPHONE (OUTPATIENT)
Dept: SLEEP CENTER | Facility: HOSPITAL | Age: 50
End: 2020-08-18

## 2020-08-18 ENCOUNTER — OFFICE VISIT (OUTPATIENT)
Dept: SLEEP CENTER | Facility: HOSPITAL | Age: 50
End: 2020-08-18
Payer: COMMERCIAL

## 2020-08-18 VITALS
HEART RATE: 100 BPM | SYSTOLIC BLOOD PRESSURE: 138 MMHG | HEIGHT: 68 IN | WEIGHT: 281 LBS | BODY MASS INDEX: 42.59 KG/M2 | DIASTOLIC BLOOD PRESSURE: 82 MMHG

## 2020-08-18 DIAGNOSIS — G47.19 EXCESSIVE DAYTIME SLEEPINESS: ICD-10-CM

## 2020-08-18 DIAGNOSIS — R55 NEAR SYNCOPE: ICD-10-CM

## 2020-08-18 DIAGNOSIS — R06.81 WITNESSED EPISODE OF APNEA: ICD-10-CM

## 2020-08-18 DIAGNOSIS — G44.52 NEW DAILY PERSISTENT HEADACHE: ICD-10-CM

## 2020-08-18 DIAGNOSIS — E66.01 MORBID OBESITY WITH BMI OF 40.0-44.9, ADULT (HCC): ICD-10-CM

## 2020-08-18 DIAGNOSIS — Q04.8 CEREBELLAR TONSILLAR ECTOPIA (HCC): ICD-10-CM

## 2020-08-18 DIAGNOSIS — I10 ESSENTIAL HYPERTENSION: ICD-10-CM

## 2020-08-18 DIAGNOSIS — F17.200 SMOKING: ICD-10-CM

## 2020-08-18 DIAGNOSIS — G47.33 OSA (OBSTRUCTIVE SLEEP APNEA): Primary | ICD-10-CM

## 2020-08-18 DIAGNOSIS — Z20.822 ENCOUNTER FOR LABORATORY TESTING FOR COVID-19 VIRUS: Primary | ICD-10-CM

## 2020-08-18 PROCEDURE — 99244 OFF/OP CNSLTJ NEW/EST MOD 40: CPT | Performed by: INTERNAL MEDICINE

## 2020-08-18 NOTE — PATIENT INSTRUCTIONS
What is NASIM? Obstructive sleep apnea is a common and serious sleep disorder that causes you to stop breathing during sleep  The airway repeatedly becomes blocked, limiting the amount of air that reaches your lungs  When this happens, you may snore loudly or making choking noises as you try to breathe  Your brain and body becomes oxygen deprived and you may wake up  This may happen a few times a night, or in more severe cases, several hundred times a night  Sleep apnea can make you wake up in the morning feeling tired or unrefreshed even though you have had a full night of sleep  During the day, you may feel fatigued, have difficulty concentrating or you may even unintentionally fall asleep  This is because your body is waking up numerous times throughout the night, even though you might not be conscious of each awakening  The lack of oxygen your body receives can have negative long-term consequences for your health  This includes:  High blood pressure  Heart disease  Irregular heart rhythms  Stroke  Pre-diabetes and diabetes  Depression    Testing  An objective evaluation of your sleep may be needed before your board certified sleep physician can make a diagnosis  Options include:   In-lab overnight sleep study  This type of sleep study requires you to stay overnight at a sleep center, in a bed that may resemble a hotel room  You will sleep with sensors hooked up to various parts of your body  These sensors record your brain waves, heartbeat, breathing and movement  An overnight sleep study also provides your doctor with the most complete information about your sleep  Learn more about an overnight sleep study      Home sleep apnea test  Some patients with high risk factors for obstructive sleep apnea and no other medical disorders may be candidates for a home sleep apnea test  The testing equipment differs in that it is less complicated than what is used in an overnight sleep study   As such, does not give all the data an in-lab will and if negative, may not mean you do not have the problem  Treatment for sleep apnea  includes using a continuous positive airway pressure (CPAP) machine to keep your airway open during sleep  A mask is placed over your nose and mouth, or just your nose  The mask is hooked to the CPAP machine that blows a gentle stream of air into the mask when you breathe  This helps keep your airway open so you can breathe more regularly  Extra oxygen may be given to you through the machine  You may be given a mouth device  It looks like a mouth guard or dental retainer and stops your tongue and mouth tissues from blocking your throat while you sleep  Surgery may be needed to remove extra tissues that block your mouth, throat, or nose  Manage sleep apnea:   Do not smoke  Nicotine and other chemicals in cigarettes and cigars can cause lung damage  Ask your healthcare provider for information if you currently smoke and need help to quit  E-cigarettes or smokeless tobacco still contain nicotine  Talk to your healthcare provider before you use these products  Do not drink alcohol or take sedative medicine before you go to sleep  Alcohol and sedatives can relax the muscles and tissues around your throat  This can block the airflow to your lungs  Maintain a healthy weight  Excess tissue around your throat may restrict your breathing  Ask your healthcare provider for information if you need to lose weight  Sleep on your side or use pillows designed to prevent sleep apnea  This prevents your tongue or other tissues from blocking your throat  You can also raise the head of your bed  Driving Safety  Refrain from driving when drowsy  Follow up with your healthcare provider as directed:  Write down your questions so you remember to ask them during your visits  Go to [employer] website for more information: Sleepeducation  org     What is NASIM?    Obstructive sleep apnea is a common and serious sleep disorder that causes you to stop breathing during sleep  The airway repeatedly becomes blocked, limiting the amount of air that reaches your lungs  When this happens, you may snore loudly or making choking noises as you try to breathe  Your brain and body becomes oxygen deprived and you may wake up  This may happen a few times a night, or in more severe cases, several hundred times a night  Sleep apnea can make you wake up in the morning feeling tired or unrefreshed even though you have had a full night of sleep  During the day, you may feel fatigued, have difficulty concentrating or you may even unintentionally fall asleep  This is because your body is waking up numerous times throughout the night, even though you might not be conscious of each awakening  The lack of oxygen your body receives can have negative long-term consequences for your health  This includes:  High blood pressure  Heart disease  Irregular heart rhythms  Stroke  Pre-diabetes and diabetes  Depression    Testing  An objective evaluation of your sleep may be needed before your board certified sleep physician can make a diagnosis  Options include:   In-lab overnight sleep study  This type of sleep study requires you to stay overnight at a sleep center, in a bed that may resemble a hotel room  You will sleep with sensors hooked up to various parts of your body  These sensors record your brain waves, heartbeat, breathing and movement  An overnight sleep study also provides your doctor with the most complete information about your sleep  Learn more about an overnight sleep study      Home sleep apnea test  Some patients with high risk factors for obstructive sleep apnea and no other medical disorders may be candidates for a home sleep apnea test  The testing equipment differs in that it is less complicated than what is used in an overnight sleep study   As such, does not give all the data an in-lab will and if negative, may not mean you do not have the problem  Treatment for sleep apnea  includes using a continuous positive airway pressure (CPAP) machine to keep your airway open during sleep  A mask is placed over your nose and mouth, or just your nose  The mask is hooked to the CPAP machine that blows a gentle stream of air into the mask when you breathe  This helps keep your airway open so you can breathe more regularly  Extra oxygen may be given to you through the machine  You may be given a mouth device  It looks like a mouth guard or dental retainer and stops your tongue and mouth tissues from blocking your throat while you sleep  Surgery may be needed to remove extra tissues that block your mouth, throat, or nose  Manage sleep apnea:   Do not smoke  Nicotine and other chemicals in cigarettes and cigars can cause lung damage  Ask your healthcare provider for information if you currently smoke and need help to quit  E-cigarettes or smokeless tobacco still contain nicotine  Talk to your healthcare provider before you use these products  Do not drink alcohol or take sedative medicine before you go to sleep  Alcohol and sedatives can relax the muscles and tissues around your throat  This can block the airflow to your lungs  Maintain a healthy weight  Excess tissue around your throat may restrict your breathing  Ask your healthcare provider for information if you need to lose weight  Sleep on your side or use pillows designed to prevent sleep apnea  This prevents your tongue or other tissues from blocking your throat  You can also raise the head of your bed  Driving Safety  Refrain from driving when drowsy  Follow up with your healthcare provider as directed:  Write down your questions so you remember to ask them during your visits  Go to AAS website for more information: Sleepeducation  org           Nursing Support:  When: Monday through Friday 7A-5PM except holidays  Where: Our direct line is [phone]      If you are having a true emergency please call 911  In the event that the line is busy or it is after hours please leave a voice message and we will return your call  Please speak clearly, leaving your full name, birth date, best number to reach you and the reason for your call  Medication refills: We will need the name of the medication, the dosage, the ordering provider, whether you get a 30 or 90 day refill, and the pharmacy name and address  Medications will be ordered by the provider only  Nurses cannot call in prescriptions  Please allow 7 days for medication refills  Physician requested updates: If your provider requested that you call with an update after starting medication, please be ready to provide us the medication and dosage, what time you take your medication, the time you attempt to fall asleep, time you fall asleep, when you wake up, and what time you get out of bed  Sleep Study Results: We will contact you with sleep study results and/or next steps after the physician has reviewed your testing

## 2020-08-18 NOTE — PROGRESS NOTES
Consultation - [facility]  52 y o  male  :1970  ETF:[id number]    Physician Requesting Consult: Jeancarlos Grimes MD     Reason for Consult : At your kind request I saw this patient for initial sleep evaluation today  The patient is here to evaluate for suspected Obstructive Sleep Apnea  PFSH, Problem List, Medications & Allergies were reviewed in EMR  He  has a past medical history of Head ache and Hypertension  He has a current medication list which includes the following prescription(s): baclofen, losartan, and mometasone  HPI:  He presents with complaints of persisted daily headaches and episodes of recurrence syncope that started around 2 months ago  He has a history of Chiari malformation and recent MRI demonstrated cerebellar tonsillar ectopia  Other Complaints:  He also reports snoring for as long as he can recall  It is loud and disturbs others  He awakens himself with snoring or choking  Family have witnessed apneas  He is unaware of modifying factors  Restless Leg Syndrome: reports no suggestive symptoms    Parasomnia: no features reported    Sleep Routine:   Typical Bedtime:  9:30 p m  Gets OOB:  3:00 a m  TIB:6 5 hrs  Sleep latency:<  30 minutes Sleep Interruptions:2/nite has had episodes of awakening with acid reflux    Awakens: spontaneously  Upon awakening: never feels rested  He estimates getting 6 hrs sleep  He has Excessive Daytime Sleepiness and naps for 2-3 hours  [place] Sleepiness Scale rated at Total score: 5 /24  Habits: reports that he has been smoking cigarettes  He has been smoking about 0 50 packs per day  He uses smokeless tobacco  , reports current alcohol use  ,  reports no history of drug use  ,Caffeine use:limited , Exercise routine: none   Family History: Negative for sleep disturbance    ROS - constitutional, psychiatric, ENT, respiratory,CVS, GI, UGS, CNS, MSK, integumentary, endocrine, hematological reviewed- see attached  Significant for approximately 15 lb weight gain in the past few months  Alona Zuniga EXAM:  /82   Pulse 100   Ht 5' 8" (1 727 m)   Wt 127 kg (281 lb)   BMI 42 73 kg/m²    General: Well groomed male, well appearing, in no apparent distress  Psychiatric: Alert and orientated; Cooperative; Speech: Loud and rapid; appears anxious  HEENT:  Craniofacial anatomy: normal Sinuses: non- tender  TMJ: Normal    Eyes: EOM's intact;  conjunctiva/corneas clear   Ears: Externallyappear normal     Nasal Airway: is patent Septum:intact; Mucosa: normal; Turbinates: normal; Rhinorrhea: None   Mouth: Lips: normal posture; Dentition: normal   Mucosa:moist  ; Hard Palate:normal    Oropharryx: crowded and AP narrowing Tongue: Mallampati:Class IV and MobileSoft Palate:  redundant  Tonsils: no hypertrophy  Neck:is thick and excess fatty tissue; Neck Circumference: 21 5 "; Supple; no abnormal masses; Thyroid:normal  Trachea:central     Lymph: No Cervical or Submandibular Lymhadenopathy  Heart: S1,S2 normal; RRR; no gallop; nomurmurs   Lungs: Respiratory Effort:normal  Air entry good bilaterally  No wheezes  No rales  Abdomen: Obese, Soft & non-tender    Extremities: No pedal edema  No clubbing or cyanosis  Skin: warm and dry; Color& Hydration good; no facial rashes or lesions   Neurological: CNII-XII intact; Motor normal; Sensation normal  Musculoskeletal: Muscle bulk, tone and power WNL Gait:normal        IMPRESSION: Primary, Secondary Sleep Diagnoses (to Medical or Psych conditions) & Comorbidities   1  NASIM (obstructive sleep apnea)  Split Study   2  Witnessed episode of apnea  Ambulatory referral to Sleep Medicine   3  Excessive daytime sleepiness  Ambulatory referral to Sleep Medicine    Split Study   4  New daily persistent headache  Split Study   5  Near syncope, recurrent     6  Cerebellar tonsillar ectopia ([facility] [address] )     7  Essential hypertension     8  Smoking     9   Morbid obesity with BMI of 40 0-44 9, adult ([address] ) PLAN:   1  Comprehensive counseling was provided on pathophysiology, diagnostic strategies & treatment options; effects on symptoms and comorbidities; risks of inadequate therapy; costs and insurance aspects  2  I advised on weight reduction, avoiding sleeping supine, using alcohol or sedating medications close to bed time and on safe driving practices  3  Cognitive behavioral therapy was initiated with advise on Sleep Hygiene and behavioral techniques to manage Insomnia  4  Nocturnal polysomnography is indicated and since he is willing to try Pap will be scheduled for a split study  Home sleep testing is contraindicated because Severe insomnia, Potential respiratory muscle weakness due to neuro muscular condition and High risk for sleep-related hypoventilation  History of Chiari malformation and may have central sleep apnea  5  I also advised on sleep hygiene, specifically allowing for sufficient nighttime sleep, avoiding naps and smoking cessation  6  Follow-up will be scheduled after the studies to review results, further details of treatment options and to initiate/adjust therapy  Thank you for allowing me to participate in the care of this patient  I will keep you apprised of developments      Sincerely,     Authenticated electronically by John Plummer MD   on 12/18/06   Board Certified Specialist

## 2020-08-18 NOTE — PROGRESS NOTES
Review of Systems      Genitourinary difficulty with erection   Cardiology Frequent chest pain or angina,    Gastrointestinal none   Neurology frequent headaches, loss of consciousness and loss of consciousness/blacking out   Constitutional none   Integumentary none   Psychiatry none   Musculoskeletal none   Pulmonary snoring and difficulty breathing when lying flat    ENT none   Endocrine none   Hematological none

## 2020-08-18 NOTE — TELEPHONE ENCOUNTER
8/18 Pt is schedule in [place] for his Split Study on 10/26/20  Pt signed [address] and given COVID Letter as well lc  Patient informed that COVID testing is to be performed 10 days prior to PAP study  Patient is to tell site that it is needed for a procedure so it is expedited  Testing site information provided

## 2020-08-28 ENCOUNTER — OFFICE VISIT (OUTPATIENT)
Dept: NEUROLOGY | Facility: CLINIC | Age: 50
End: 2020-08-28
Payer: COMMERCIAL

## 2020-08-28 ENCOUNTER — APPOINTMENT (OUTPATIENT)
Dept: LAB | Facility: HOSPITAL | Age: 50
End: 2020-08-28
Attending: PSYCHIATRY & NEUROLOGY
Payer: COMMERCIAL

## 2020-08-28 ENCOUNTER — TRANSCRIBE ORDERS (OUTPATIENT)
Dept: ADMINISTRATIVE | Facility: HOSPITAL | Age: 50
End: 2020-08-28

## 2020-08-28 VITALS
HEART RATE: 96 BPM | WEIGHT: 284.6 LBS | TEMPERATURE: 98 F | HEIGHT: 68 IN | BODY MASS INDEX: 43.13 KG/M2 | SYSTOLIC BLOOD PRESSURE: 143 MMHG | RESPIRATION RATE: 18 BRPM | DIASTOLIC BLOOD PRESSURE: 76 MMHG

## 2020-08-28 DIAGNOSIS — R55 NEAR SYNCOPE: ICD-10-CM

## 2020-08-28 DIAGNOSIS — G95.19 VASCULAR MYELOPATHIES (HCC): ICD-10-CM

## 2020-08-28 DIAGNOSIS — G95.19 EDEMA OF SPINAL CORD (HCC): Primary | ICD-10-CM

## 2020-08-28 DIAGNOSIS — G95.19 VASCULAR MYELOPATHIES (HCC): Primary | ICD-10-CM

## 2020-08-28 DIAGNOSIS — R06.83 SNORING: ICD-10-CM

## 2020-08-28 DIAGNOSIS — G44.52 NEW DAILY PERSISTENT HEADACHE: ICD-10-CM

## 2020-08-28 PROCEDURE — 3078F DIAST BP <80 MM HG: CPT | Performed by: PSYCHIATRY & NEUROLOGY

## 2020-08-28 PROCEDURE — 86255 FLUORESCENT ANTIBODY SCREEN: CPT

## 2020-08-28 PROCEDURE — 99214 OFFICE O/P EST MOD 30 MIN: CPT | Performed by: PSYCHIATRY & NEUROLOGY

## 2020-08-28 PROCEDURE — 3008F BODY MASS INDEX DOCD: CPT | Performed by: PSYCHIATRY & NEUROLOGY

## 2020-08-28 PROCEDURE — 36415 COLL VENOUS BLD VENIPUNCTURE: CPT

## 2020-08-28 PROCEDURE — 3077F SYST BP >= 140 MM HG: CPT | Performed by: PSYCHIATRY & NEUROLOGY

## 2020-08-28 RX ORDER — GABAPENTIN 300 MG/1
CAPSULE ORAL
Qty: 90 CAPSULE | Refills: 2 | Status: SHIPPED | OUTPATIENT
Start: 2020-08-28 | End: 2021-09-10

## 2020-08-28 NOTE — ASSESSMENT & PLAN NOTE
In conjunction with witnessed apneic gaps and daytime sleepiness and morning headache on a daily basis  Now being seen by Sleep Medicine  Scheduled for an upcoming diagnostic sleep study  --will keep his appointment for the sleep study as well as his ongoing follow-up to sleep medicine

## 2020-08-28 NOTE — ASSESSMENT & PLAN NOTE
Still present and problematic  Non responsive to baclofen  Present from awakening in the morning throughout the day  Suspect untreated sleep apnea is a contributing factor  --quick taper off baclofen  --alternately a trial on gabapentin beginning with 300 mg capsules 1 at bedtime nightly for 3 days then 1 b i d  --will keep his appointment for his diagnostic sleep study and follow-up with sleep medicine

## 2020-08-28 NOTE — ASSESSMENT & PLAN NOTE
Describes an occasional transient lightheadedness  EEG normal   24 hour Holter monitor unremarkable

## 2020-08-28 NOTE — PROGRESS NOTES
Patient ID: Roverto Thomas is a 52 y o  male  Assessment/Plan:    Edema of spinal cord (HCC)  Presently describing no myelopathic symptoms and no overt myelopathic findings on his examination  Question as to the nature of the edema  Possibly a myelitis  Will require further evaluation through the MS team   --check [facility]  --patient with a follow-up appointment with Dr Patten on September 14, 2020  New daily persistent headache  Still present and problematic  Non responsive to baclofen  Present from awakening in the morning throughout the day  Suspect untreated sleep apnea is a contributing factor  --quick taper off baclofen  --alternately a trial on gabapentin beginning with 300 mg capsules 1 at bedtime nightly for 3 days then 1 b i d  --will keep his appointment for his diagnostic sleep study and follow-up with sleep medicine  Snoring  In conjunction with witnessed apneic gaps and daytime sleepiness and morning headache on a daily basis  Now being seen by Sleep Medicine  Scheduled for an upcoming diagnostic sleep study  --will keep his appointment for the sleep study as well as his ongoing follow-up to sleep medicine  Near syncope, recurrent  Describes an occasional transient lightheadedness  EEG normal   24 hour Holter monitor unremarkable  He will follow up in mid September with Dr Antonette Miller  Subjective:    HPI  The patient, 52years of age and right-handed, with additional diagnosis of hypertension, returns to review the results of studies as well as the status of several issues  His initial problem was that of headache  This has been present for several months and described as holocranial and a tightness present from the moment he gets up throughout the entire day  Felt to be of a tension type/myogenic origin and very possibly accentuated in the presence of what appears to be an untreated and as yet undiagnosed obstructive sleep apnea    He was tried on baclofen and increased eventually to 10 mg 3 times daily which she is currently on  He does not feel this has provided him with any significant headache benefit  Second issue is that of intermittent lightheadedness which has not been problematic as of late  He did have his follow-up studies performed  Those were reviewed  EEG normal   24 hour Holter monitor study unremarkable  He does have a history of heavy snoring, apneic gaps and daytime sleepiness  He has now been seen by Sleep Medicine  He is scheduled for an upcoming diagnostic sleep study  He was also found on his brain MRI to have mild cerebellar ectopia  This prompted neurosurgical assessment  Further evaluation through cervical MRI demonstrated cord edema and the high cervical region  Origin unclear  Question myelitis  Presently describing no issues with gait, bowel or bladder function, motor function, etc     Additional studies include and negative Lyme serology and normal TSH 2 250      Past Medical History:   Diagnosis Date    Head ache     Hypertension      Past Surgical History:   Procedure Laterality Date    CARPAL TUNNEL RELEASE Left      Social History     Socioeconomic History    Marital status: Single     Spouse name: None    Number of children: None    Years of education: None    Highest education level: None   Occupational History    None   Social Needs    Financial resource strain: None    Food insecurity     Worry: None     Inability: None    Transportation needs     Medical: None     Non-medical: None   Tobacco Use    Smoking status: Current Every Day Smoker     Packs/day: 0 50     Types: Cigarettes    Smokeless tobacco: Current User   Substance and Sexual Activity    Alcohol use: Yes     Comment: Everyday     Drug use: No    Sexual activity: Yes     Partners: Female   Lifestyle    Physical activity     Days per week: None     Minutes per session: None    Stress: None   Relationships    Social connections Talks on phone: None     Gets together: None     Attends Latter-day service: None     Active member of club or organization: None     Attends meetings of clubs or organizations: None     Relationship status: None    Intimate partner violence     Fear of current or ex partner: None     Emotionally abused: None     Physically abused: None     Forced sexual activity: None   Other Topics Concern    None   Social History Narrative    None     Family History   Problem Relation Age of Onset    No Known Problems Mother     No Known Problems Father     Cancer Maternal Grandmother     Cancer Maternal Grandfather     Substance Abuse Brother     Substance Abuse Brother     Alcohol abuse Neg Hx     Mental illness Neg Hx      Allergies   Allergen Reactions    Lisinopril Cough       Current Outpatient Medications:     baclofen 10 mg tablet, By mouth take 1 tablet 3 times daily or as directed, Disp: 90 tablet, Rfl: 2    losartan (Cozaar) 50 mg tablet, Take 1 tablet (50 mg total) by mouth daily, Disp: 90 tablet, Rfl: 3    gabapentin (NEURONTIN) 300 mg capsule, By mouth take 1 capsule 3 times daily or as directed, Disp: 90 capsule, Rfl: 2    mometasone (ELOCON) 0 1 % cream, Apply topically daily (Patient not taking: Reported on 8/28/2020), Disp: 45 g, Rfl: 0    Objective:    Blood pressure 143/76, pulse 96, temperature 98 °F (36 7 °C), resp  rate 18, height 5' 8" (1 727 m), weight 129 kg (284 lb 9 6 oz)  Physical Exam  BMI 43 27  Head normocephalic  Eyes nonicteric  Once again a very small posterior oropharyngeal aperture noted  Lungs clear to auscultation  Rhythm regular  GI (abdomen) soft nontender with bowel sounds present  No significant lower extremity edema  Neurological Exam  Alert  Pleasantly interactive  Fully oriented with no dysarthria  Spontaneous gait unremarkable  Romberg maneuver performed unremarkably  Cranial nerves 2-12 tested and grossly intact    Essentially full symmetrical strength throughout the 4 extremities  No upper extremity drift  Sensory testing grossly intact to pin and position throughout  Muscle stretch reflexes bilaterally 1 throughout the upper extremities and at the knees and bilaterally 1+ at the ankles  Toe response again downgoing bilaterally  ROS:    Review of Systems   Constitutional: Negative  Negative for appetite change and fever  HENT: Negative  Negative for hearing loss, tinnitus, trouble swallowing and voice change  Eyes: Negative  Negative for photophobia and pain  Respiratory: Negative  Negative for shortness of breath  Cardiovascular: Negative  Negative for palpitations  Gastrointestinal: Negative  Negative for nausea and vomiting  Endocrine: Negative  Negative for cold intolerance  Genitourinary: Negative  Negative for dysuria, frequency and urgency  Musculoskeletal: Negative  Negative for myalgias and neck pain  Skin: Negative  Negative for rash  Allergic/Immunologic: Negative  Neurological: Positive for dizziness, light-headedness and headaches  Negative for tremors, seizures, syncope, facial asymmetry, speech difficulty, weakness and numbness  Hematological: Negative  Does not bruise/bleed easily  Psychiatric/Behavioral: Positive for sleep disturbance  Negative for confusion and hallucinations  I personally reviewed the ROS as entered by the medical assistant  *Please note this document was created using voice recognition software and may contain sound-alike word errors  *

## 2020-08-28 NOTE — LETTER
August 28, 2020     Chavo [address]anson [address], [address]    Patient: Elissa Leonard   YOB: 1970   Date of Visit: 8/28/2020       Dear Dr Susana Bazan: Thank you for referring Yovani Jain to me for evaluation  Below are my notes for this consultation  If you have questions, please do not hesitate to call me  I look forward to following your patient along with you  Sincerely,        Jen Elizabeth MD        CC: MD Jen Negro MD  8/28/2020  8:36 AM  Sign when Signing Visit  Patient ID: Elissa Leonard is a 52 y o  male  Assessment/Plan:    Edema of spinal cord (HCC)  Presently describing no myelopathic symptoms and no overt myelopathic findings on his examination  Question as to the nature of the edema  Possibly a myelitis  Will require further evaluation through the MS team   --check [facility]  --patient with a follow-up appointment with Dr Patten on September 14, 2020  New daily persistent headache  Still present and problematic  Non responsive to baclofen  Present from awakening in the morning throughout the day  Suspect untreated sleep apnea is a contributing factor  --quick taper off baclofen  --alternately a trial on gabapentin beginning with 300 mg capsules 1 at bedtime nightly for 3 days then 1 b i d  --will keep his appointment for his diagnostic sleep study and follow-up with sleep medicine  Snoring  In conjunction with witnessed apneic gaps and daytime sleepiness and morning headache on a daily basis  Now being seen by Sleep Medicine  Scheduled for an upcoming diagnostic sleep study  --will keep his appointment for the sleep study as well as his ongoing follow-up to sleep medicine  Near syncope, recurrent  Describes an occasional transient lightheadedness  EEG normal   24 hour Holter monitor unremarkable        He will follow up in mid September with   Earline  Subjective:    HPI  The patient, 52years of age and right-handed, with additional diagnosis of hypertension, returns to review the results of studies as well as the status of several issues  His initial problem was that of headache  This has been present for several months and described as holocranial and a tightness present from the moment he gets up throughout the entire day  Felt to be of a tension type/myogenic origin and very possibly accentuated in the presence of what appears to be an untreated and as yet undiagnosed obstructive sleep apnea  He was tried on baclofen and increased eventually to 10 mg 3 times daily which she is currently on  He does not feel this has provided him with any significant headache benefit  Second issue is that of intermittent lightheadedness which has not been problematic as of late  He did have his follow-up studies performed  Those were reviewed  EEG normal   24 hour Holter monitor study unremarkable  He does have a history of heavy snoring, apneic gaps and daytime sleepiness  He has now been seen by Sleep Medicine  He is scheduled for an upcoming diagnostic sleep study  He was also found on his brain MRI to have mild cerebellar ectopia  This prompted neurosurgical assessment  Further evaluation through cervical MRI demonstrated cord edema and the high cervical region  Origin unclear  Question myelitis  Presently describing no issues with gait, bowel or bladder function, motor function, etc     Additional studies include and negative Lyme serology and normal TSH 2 250      Past Medical History:   Diagnosis Date    Head ache     Hypertension      Past Surgical History:   Procedure Laterality Date    CARPAL TUNNEL RELEASE Left      Social History     Socioeconomic History    Marital status: Single     Spouse name: None    Number of children: None    Years of education: None    Highest education level: None   Occupational History    None Social Needs    Financial resource strain: None    Food insecurity     Worry: None     Inability: None    Transportation needs     Medical: None     Non-medical: None   Tobacco Use    Smoking status: Current Every Day Smoker     Packs/day: 0 50     Types: Cigarettes    Smokeless tobacco: Current User   Substance and Sexual Activity    Alcohol use: Yes     Comment: Everyday     Drug use: No    Sexual activity: Yes     Partners: Female   Lifestyle    Physical activity     Days per week: None     Minutes per session: None    Stress: None   Relationships    Social connections     Talks on phone: None     Gets together: None     Attends Anabaptist service: None     Active member of club or organization: None     Attends meetings of clubs or organizations: None     Relationship status: None    Intimate partner violence     Fear of current or ex partner: None     Emotionally abused: None     Physically abused: None     Forced sexual activity: None   Other Topics Concern    None   Social History Narrative    None     Family History   Problem Relation Age of Onset    No Known Problems Mother     No Known Problems Father     Cancer Maternal Grandmother     Cancer Maternal Grandfather     Substance Abuse Brother     Substance Abuse Brother     Alcohol abuse Neg Hx     Mental illness Neg Hx      Allergies   Allergen Reactions    Lisinopril Cough       Current Outpatient Medications:     baclofen 10 mg tablet, By mouth take 1 tablet 3 times daily or as directed, Disp: 90 tablet, Rfl: 2    losartan (Cozaar) 50 mg tablet, Take 1 tablet (50 mg total) by mouth daily, Disp: 90 tablet, Rfl: 3    gabapentin (NEURONTIN) 300 mg capsule, By mouth take 1 capsule 3 times daily or as directed, Disp: 90 capsule, Rfl: 2    mometasone (ELOCON) 0 1 % cream, Apply topically daily (Patient not taking: Reported on 8/28/2020), Disp: 45 g, Rfl: 0    Objective:    Blood pressure 143/76, pulse 96, temperature 98 °F (36 7 °C), resp  rate 18, height 5' 8" (1 727 m), weight 129 kg (284 lb 9 6 oz)  Physical Exam  BMI 43 27  Head normocephalic  Eyes nonicteric  Once again a very small posterior oropharyngeal aperture noted  Lungs clear to auscultation  Rhythm regular  GI (abdomen) soft nontender with bowel sounds present  No significant lower extremity edema  Neurological Exam  Alert  Pleasantly interactive  Fully oriented with no dysarthria  Spontaneous gait unremarkable  Romberg maneuver performed unremarkably  Cranial nerves 2-12 tested and grossly intact  Essentially full symmetrical strength throughout the 4 extremities  No upper extremity drift  Sensory testing grossly intact to pin and position throughout  Muscle stretch reflexes bilaterally 1 throughout the upper extremities and at the knees and bilaterally 1+ at the ankles  Toe response again downgoing bilaterally  ROS:    Review of Systems   Constitutional: Negative  Negative for appetite change and fever  HENT: Negative  Negative for hearing loss, tinnitus, trouble swallowing and voice change  Eyes: Negative  Negative for photophobia and pain  Respiratory: Negative  Negative for shortness of breath  Cardiovascular: Negative  Negative for palpitations  Gastrointestinal: Negative  Negative for nausea and vomiting  Endocrine: Negative  Negative for cold intolerance  Genitourinary: Negative  Negative for dysuria, frequency and urgency  Musculoskeletal: Negative  Negative for myalgias and neck pain  Skin: Negative  Negative for rash  Allergic/Immunologic: Negative  Neurological: Positive for dizziness, light-headedness and headaches  Negative for tremors, seizures, syncope, facial asymmetry, speech difficulty, weakness and numbness  Hematological: Negative  Does not bruise/bleed easily  Psychiatric/Behavioral: Positive for sleep disturbance  Negative for confusion and hallucinations         I personally reviewed the ROS as entered by the medical assistant  *Please note this document was created using voice recognition software and may contain sound-alike word errors  *

## 2020-08-28 NOTE — PATIENT INSTRUCTIONS
Quickly taper off baclofen using 10 mg tablets to 1 twice daily for 2 days then 1 daily for 2 days then stop  Begin gabapentin 300 mg capsules taking 1 capsule at bedtime daily for 3 days and then 1 capsule twice daily

## 2020-08-28 NOTE — ASSESSMENT & PLAN NOTE
Presently describing no myelopathic symptoms and no overt myelopathic findings on his examination  Question as to the nature of the edema  Possibly a myelitis  Will require further evaluation through the MS team   --check [facility]  --patient with a follow-up appointment with Dr Patten on September 14, 2020

## 2020-09-03 ENCOUNTER — TELEPHONE (OUTPATIENT)
Dept: NEUROLOGY | Facility: CLINIC | Age: 50
End: 2020-09-03

## 2020-09-03 NOTE — TELEPHONE ENCOUNTER
Received form  It is a request for last office note  Shanique Khan 50 is asking for date of full duty or light duty return to work  Reviewed last office note  Would you like us to put "unknown at this time- further testing required"? Once Dr Isabel Camp confirm return to work status will fax request back with last office note  Placed form upfront to be scanned to pts chart-also have original form on my desk

## 2020-09-03 NOTE — TELEPHONE ENCOUNTER
Pt calls in to state there is a from he needs signed for his disability  He believes it was faxed to us last week  I am unable to located form  Verified that pt has our correct fax number  He will have them refax to us

## 2020-09-04 NOTE — TELEPHONE ENCOUNTER
Faxed completed form and office notes (8/28) to fax: [phone]  Patient made aware, advised he f/u with [employer] to confirm receipt  Patient verbalized understanding  Copy of completed form sent to central fax

## 2020-09-04 NOTE — TELEPHONE ENCOUNTER
Patient checking on the status of form  We did receive it  Informed patient we will contact him once form has been sent

## 2020-09-04 NOTE — TELEPHONE ENCOUNTER
Yes please put in un known at this time  It can then be [place] addressed after patient sees Dr Patten    Thanks

## 2020-09-11 ENCOUNTER — TELEPHONE (OUTPATIENT)
Dept: NEUROLOGY | Facility: CLINIC | Age: 50
End: 2020-09-11

## 2020-09-11 NOTE — TELEPHONE ENCOUNTER
LMOM for pt to call back to confirm scheduled appointment for Monday 9/14/20 with Dr Anurag Sparrow in [facility]

## 2020-09-13 NOTE — PROGRESS NOTES
[facility]  PATIENT:  Elissa Leonard  MRN:  [id number]  :  1970  DATE OF SERVICE:  2020    Assessment/Plan:           Problem List Items Addressed This Visit        Cardiovascular and Mediastinum    Near syncope, recurrent    Relevant Orders    VAS carotid complete study    Ambulatory referral to Cardiology       Nervous and Auditory    Cerebellar tonsillar ectopia ([facility] [address] ) - Primary    Edema of spinal cord (HCC)    Cervical myelopathy (HCC)    Relevant Orders    MISCELLANEOUS LAB TEST    CLINT Multiplex With Reflex To dsDNA    Sjogren's Antibodies    ANCA Screen With MPO and PR3 With Reflex To ANCA Titer    Angiotensin converting enzyme    Thrombosis Panel      Other Visit Diagnoses     Chronic tension-type headache, not intractable        Relevant Medications    ketorolac (TORADOL) 10 mg tablet    cyanocobalamin injection 1,000 mcg (Start on 2020  9:00 AM)    ketorolac (TORADOL) injection 30 mg    cyanocobalamin 1,000 mcg/mL    SYRINGE-NEEDLE, DISP, 3 ML 25G X 1" 3 ML MISC    Other Relevant Orders    Intrinsic factor blocking antibody    B12 deficiency        Relevant Medications    cyanocobalamin 1,000 mcg/mL    SYRINGE-NEEDLE, DISP, 3 ML 25G X 1" 3 ML MISC    Cyanocobalamin 1000 MCG SUBL          Mr Marcy Lockhart has presented to [facility] [address] for evaluation of cervical spine lesion in the setting of pranay syncope and persistent headaches    Patient had completed imaging of the cervical and thoracic spine for evaluation of Chiari I malformation, as patient has 2 months long history of upper neck pain and headache with no other focal neurological deficit     -NMO/MOG/SLE/Sjogren's/ACE as secondary causes of cervical spine vasculitis with a great concern for spinal cord stroke, considering patient clinical presentation   - radio graphically resembling NMO-SD vs vascular in origin, no enhancement on his recent MRI questions actual time of initial presentation  Uncontrolled NASIM brings risk for spinal cord stroke as well  Thrombosis panel must be completed to rule out genetically  Predisposed hypercoagulable state  - LP will be advised after cardiology work up completed  - repeated near-syncope - EEG was normal along with Holter monitoring, HR  bpm, no arrhythmia; with no brain pathology considering causes of syncope; patient is to proceed with VAS Carotid/Tilt table test  and Cariology consult, 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  Ankle edema bilaterally has been concerning    - untreated NASIM may also bring concern for cord CVA, sleep study has been pending   - tension type headache - B12 deficiency will be addressed, with B12 1000 mcg once a week for 4 weeks and once a month for 6 months advised, ketorolac was provided for abortive measures of his headaches;   - cervical degenerative disc disease with neck pain - improved with baclofen and gabapentin;    Patient is to continue practicing safety measures during the novel coronovirus public health emergency  FU in 4-6 weeks        Subjective:  Near-syncope, headache    HPI  Mr Denise Fuentes has presented to [address] for evaluation of abnormal imaging studies  Patient initial evaluation was provided by Dr Citlaly Piña for headaches and near syncope ( routine EEG normal)  on 7/17/2020  Patient was recognized to have 5 mm cerebellar ectopia below foramen magnum, with neurosurgical team involvement recommended;  B12 is 289 pg/ml, normal methylmalonic acid; lyme negative; Patient has known Viral URI 3/26/2020; HTN, fatigue, morbid obesity; right calf pain; excessive daytime sleepiness and apnea; Patient described having no other events of lightheadedness or syncope, since last seen by neurologist specialist Dr Citlaly Piña  Patient described no shortness of breath, no chest tightness, no palpitation    Holter monitoring had been completed and discussed with the patient- patient has intermittent PACs, no other major findings  Routine EEG had been completed and unremarkable  No concern for seizure activity  MRI brain 7/8/2020: No mass effect, acute intracranial hemorrhage or evidence of recent infarction  No abnormal parenchymal or leptomeningeal enhancement identified      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  MRI C-spine 7/31/2020: There is abnormal edema present within the upper cervical cord extending from the midportion of C2 to the level of the C4-5 disc space best seen on series 2 image 8  No clear cord expansion or abnormal enhancement  Findings are nonspecific and despite   lack of enhancement differential considerations would include transverse myelitis      Cervical degenerative disc disease C3-4 through C6-7 with annular bulging and uncinate joint hypertrophic change  Moderate right foraminal narrowing is seen at the C3-4, C4-5 and C6-7 levels      At C6-7 there is a broad-based central disc herniation effacing the ventral CSF space and flattening the cord  No focal cord edema at this level  MRI T-spine 7/2020: C6-7 annular bulging and uncinate joint hypertrophic change at the superior margin of this examination with mild to moderate canal stenosis and foraminal narrowing      At the T11-12 level there is slight annular bulging with mild canal stenosis but no nerve impingement  The following portions of the patient's history were reviewed and updated as appropriate: He  has a past medical history of Head ache and Hypertension    He   Patient Active Problem List    Diagnosis Date Noted    Cervical myelopathy ([address] ) 09/14/2020    Cerebellar tonsillar ectopia ([address] ) 08/04/2020    Edema of spinal cord ([facility] [address] ) 08/04/2020    New daily persistent headache 07/17/2020    Near syncope, recurrent 07/17/2020    Snoring 07/17/2020    Abnormal brain MRI 07/17/2020    Essential hypertension 12/06/2019    Morbid obesity with BMI of 40 0-44 9, adult ([facility] [address] ) 12/06/2019    Smoking 12/06/2019     He  has a past surgical history that includes Carpal tunnel release (Left)  His family history includes Cancer in his maternal grandfather and maternal grandmother; No Known Problems in his father and mother; Substance Abuse in his brother and brother  He  reports that he has been smoking cigarettes  He has been smoking about 0 50 packs per day  He uses smokeless tobacco  He reports current alcohol use  He reports that he does not use drugs    Current Outpatient Medications   Medication Sig Dispense Refill    gabapentin (NEURONTIN) 300 mg capsule By mouth take 1 capsule 3 times daily or as directed 90 capsule 2    losartan (Cozaar) 50 mg tablet Take 1 tablet (50 mg total) by mouth daily 90 tablet 3    vitamin B-12 (VITAMIN B-12) 1,000 mcg tablet Take 1,000 mcg by mouth daily      baclofen 10 mg tablet By mouth take 1 tablet 3 times daily or as directed (Patient not taking: Reported on 9/14/2020) 90 tablet 2    cyanocobalamin 1,000 mcg/mL Take B12 1000 mcg IM once a week for 3 weeks, then once a month for 5 months 8 mL 0    Cyanocobalamin 1000 MCG SUBL Place 1 tablet (1,000 mcg total) under the tongue daily 90 tablet 0    ketorolac (TORADOL) 10 mg tablet Take 1 tablet (10 mg total) by mouth every 6 (six) hours as needed for severe pain 20 tablet 0    mometasone (ELOCON) 0 1 % cream Apply topically daily (Patient not taking: Reported on 8/28/2020) 45 g 0    SYRINGE-NEEDLE, DISP, 3 ML 25G X 1" 3 ML MISC Inject into a muscle every 28 days 8 each 0     Current Facility-Administered Medications   Medication Dose Route Frequency Provider Last Rate Last Dose    cyanocobalamin injection 1,000 mcg  1,000 mcg Intramuscular Q30 Days Sterling Mclain MD        ketorolac (TORADOL) injection 30 mg  30 mg Intramuscular Once Sterling Mclain MD         Current Outpatient Medications on File Prior to Visit   Medication Sig    gabapentin (NEURONTIN) 300 mg capsule By mouth take 1 capsule 3 times daily or as directed    losartan (Cozaar) 50 mg tablet Take 1 tablet (50 mg total) by mouth daily    vitamin B-12 (VITAMIN B-12) 1,000 mcg tablet Take 1,000 mcg by mouth daily    baclofen 10 mg tablet By mouth take 1 tablet 3 times daily or as directed (Patient not taking: Reported on 9/14/2020)    mometasone (ELOCON) 0 1 % cream Apply topically daily (Patient not taking: Reported on 8/28/2020)     No current facility-administered medications on file prior to visit  He is allergic to lisinopril            Objective:    Blood pressure 142/73, pulse 104, temperature 99 7 °F (37 6 °C), temperature source Temporal, resp  rate 16, height 5' 8" (1 727 m), weight 133 kg (292 lb 3 2 oz)  Physical Exam    Neurological Exam    CONSTITUTIONAL: NAD, pleasant  NECK: supple, no lymphadenopathy, no thyromegaly, no JVD  CARDIOVASCULAR: RRR, normal S1S2, no murmurs, no rubs  RESP: clear to auscultation bilaterally, no wheezes/rhonchi/rales  ABDOMEN: soft, non tender, non distended  SKIN: no rash or skin lesions  EXTREMITIES: no edema, pulses 2+bilaterally  PSYCH: appropriate mood and affect  NEUROLOGIC COMPREHENSIVE EXAM: Patient is oriented to person, place and time, NAD; appropriate affect  CN II, III, IV, V, VI, VII,VIII,IX,X,XI-XII intact with EOMI, PERRLA, OKN intact, VF grossly intact, fundi poorly visualized secondary to pupillary constriction; symmetric face noted  Motor: 5/5 UE/LE bilateral symmetric; Sensory: intact to light touch and pinprick bilaterally; normal vibration sensation feet bilaterally; Coordination within normal limits on FTN and EDWARD testing; DTR: 2/4 through, no Babinski, no clonus  Tandem gait is intact  Romberg: negative  ROS:  12 points of review of system was reviewed with the patient and was unremarkable with exception: see HPI  Review of Systems  Constitutional: Negative  Negative for appetite change and fever  HENT: Negative   Negative for hearing loss, tinnitus, trouble swallowing and voice change  Eyes: Negative  Negative for photophobia and pain  Respiratory: Negative  Negative for shortness of breath  Cardiovascular: Negative  Negative for palpitations  Gastrointestinal: Negative  Negative for nausea and vomiting  Endocrine: Negative  Negative for cold intolerance  Genitourinary: Negative  Negative for dysuria, frequency and urgency  Musculoskeletal: Positive for neck pain  Negative for myalgias  Pain while walking legs feel heavy   Skin: Negative  Negative for rash  Allergic/Immunologic: Negative  Neurological: Positive for light-headedness and headaches  Negative for dizziness, tremors, seizures, syncope, facial asymmetry, speech difficulty, weakness and numbness  Leg pain and swelling   Hematological: Negative  Does not bruise/bleed easily  Psychiatric/Behavioral: Positive for sleep disturbance  Negative for confusion and hallucinations

## 2020-09-14 ENCOUNTER — APPOINTMENT (OUTPATIENT)
Dept: LAB | Facility: HOSPITAL | Age: 50
End: 2020-09-14
Attending: PSYCHIATRY & NEUROLOGY
Payer: COMMERCIAL

## 2020-09-14 ENCOUNTER — OFFICE VISIT (OUTPATIENT)
Dept: NEUROLOGY | Facility: CLINIC | Age: 50
End: 2020-09-14
Payer: COMMERCIAL

## 2020-09-14 ENCOUNTER — TRANSCRIBE ORDERS (OUTPATIENT)
Dept: ADMINISTRATIVE | Facility: HOSPITAL | Age: 50
End: 2020-09-14

## 2020-09-14 VITALS
WEIGHT: 292.2 LBS | HEART RATE: 104 BPM | BODY MASS INDEX: 44.28 KG/M2 | DIASTOLIC BLOOD PRESSURE: 73 MMHG | RESPIRATION RATE: 16 BRPM | SYSTOLIC BLOOD PRESSURE: 142 MMHG | HEIGHT: 68 IN | TEMPERATURE: 99.7 F

## 2020-09-14 DIAGNOSIS — G95.9 CERVICAL MYELOPATHY (HCC): ICD-10-CM

## 2020-09-14 DIAGNOSIS — G95.9 DISEASE OF SPINAL CORD (HCC): ICD-10-CM

## 2020-09-14 DIAGNOSIS — G95.9 DISEASE OF SPINAL CORD (HCC): Primary | ICD-10-CM

## 2020-09-14 DIAGNOSIS — Q04.8 CEREBELLAR TONSILLAR ECTOPIA (HCC): Primary | ICD-10-CM

## 2020-09-14 DIAGNOSIS — E53.8 B12 DEFICIENCY: ICD-10-CM

## 2020-09-14 DIAGNOSIS — R55 NEAR SYNCOPE: ICD-10-CM

## 2020-09-14 DIAGNOSIS — G44.229 CHRONIC TENSION-TYPE HEADACHE, NOT INTRACTABLE: ICD-10-CM

## 2020-09-14 DIAGNOSIS — G95.19 EDEMA OF SPINAL CORD (HCC): ICD-10-CM

## 2020-09-14 DIAGNOSIS — G95.19 VASCULAR MYELOPATHIES (HCC): ICD-10-CM

## 2020-09-14 LAB
DEPRECATED AT III PPP: 87 % OF NORMAL (ref 92–136)
PROT C AG ACT/NOR PPP IA: 97 % OF NORMAL (ref 60–150)

## 2020-09-14 PROCEDURE — 86255 FLUORESCENT ANTIBODY SCREEN: CPT

## 2020-09-14 PROCEDURE — 85613 RUSSELL VIPER VENOM DILUTED: CPT

## 2020-09-14 PROCEDURE — 36415 COLL VENOUS BLD VENIPUNCTURE: CPT

## 2020-09-14 PROCEDURE — 86147 CARDIOLIPIN ANTIBODY EA IG: CPT

## 2020-09-14 PROCEDURE — 81241 F5 GENE: CPT

## 2020-09-14 PROCEDURE — 86235 NUCLEAR ANTIGEN ANTIBODY: CPT

## 2020-09-14 PROCEDURE — 82164 ANGIOTENSIN I ENZYME TEST: CPT

## 2020-09-14 PROCEDURE — 85306 CLOT INHIBIT PROT S FREE: CPT

## 2020-09-14 PROCEDURE — 85705 THROMBOPLASTIN INHIBITION: CPT

## 2020-09-14 PROCEDURE — 85303 CLOT INHIBIT PROT C ACTIVITY: CPT

## 2020-09-14 PROCEDURE — 85300 ANTITHROMBIN III ACTIVITY: CPT

## 2020-09-14 PROCEDURE — 85305 CLOT INHIBIT PROT S TOTAL: CPT

## 2020-09-14 PROCEDURE — 99215 OFFICE O/P EST HI 40 MIN: CPT | Performed by: PSYCHIATRY & NEUROLOGY

## 2020-09-14 PROCEDURE — 81240 F2 GENE: CPT

## 2020-09-14 PROCEDURE — 85732 THROMBOPLASTIN TIME PARTIAL: CPT

## 2020-09-14 PROCEDURE — 86340 INTRINSIC FACTOR ANTIBODY: CPT

## 2020-09-14 PROCEDURE — 96372 THER/PROPH/DIAG INJ SC/IM: CPT | Performed by: PSYCHIATRY & NEUROLOGY

## 2020-09-14 PROCEDURE — 86146 BETA-2 GLYCOPROTEIN ANTIBODY: CPT

## 2020-09-14 PROCEDURE — 85670 THROMBIN TIME PLASMA: CPT

## 2020-09-14 RX ORDER — CYANOCOBALAMIN 1000 UG/ML
INJECTION INTRAMUSCULAR; SUBCUTANEOUS
Qty: 8 ML | Refills: 0 | Status: SHIPPED | OUTPATIENT
Start: 2020-09-14 | End: 2020-10-18

## 2020-09-14 RX ORDER — LANOLIN ALCOHOL/MO/W.PET/CERES
1000 CREAM (GRAM) TOPICAL DAILY
COMMUNITY
End: 2020-10-06 | Stop reason: SDUPTHER

## 2020-09-14 RX ORDER — KETOROLAC TROMETHAMINE 30 MG/ML
30 INJECTION, SOLUTION INTRAMUSCULAR; INTRAVENOUS ONCE
Status: COMPLETED | OUTPATIENT
Start: 2020-09-14 | End: 2020-09-14

## 2020-09-14 RX ORDER — CYANOCOBALAMIN 1000 UG/ML
1000 INJECTION INTRAMUSCULAR; SUBCUTANEOUS
Status: SHIPPED | OUTPATIENT
Start: 2020-09-14

## 2020-09-14 RX ORDER — KETOROLAC TROMETHAMINE 10 MG/1
10 TABLET, FILM COATED ORAL EVERY 6 HOURS PRN
Qty: 20 TABLET | Refills: 0 | Status: SHIPPED | OUTPATIENT
Start: 2020-09-14

## 2020-09-14 RX ADMIN — CYANOCOBALAMIN 1000 MCG: 1000 INJECTION INTRAMUSCULAR; SUBCUTANEOUS at 08:41

## 2020-09-14 RX ADMIN — KETOROLAC TROMETHAMINE 30 MG: 30 INJECTION, SOLUTION INTRAMUSCULAR; INTRAVENOUS at 08:41

## 2020-09-14 NOTE — PROGRESS NOTES
Review of Systems   Constitutional: Negative  Negative for appetite change and fever  HENT: Negative  Negative for hearing loss, tinnitus, trouble swallowing and voice change  Eyes: Negative  Negative for photophobia and pain  Respiratory: Negative  Negative for shortness of breath  Cardiovascular: Negative  Negative for palpitations  Gastrointestinal: Negative  Negative for nausea and vomiting  Endocrine: Negative  Negative for cold intolerance  Genitourinary: Negative  Negative for dysuria, frequency and urgency  Musculoskeletal: Positive for neck pain  Negative for myalgias  Pain while walking legs feel heavy   Skin: Negative  Negative for rash  Allergic/Immunologic: Negative  Neurological: Positive for light-headedness and headaches  Negative for dizziness, tremors, seizures, syncope, facial asymmetry, speech difficulty, weakness and numbness  Leg pain and swelling   Hematological: Negative  Does not bruise/bleed easily  Psychiatric/Behavioral: Positive for sleep disturbance  Negative for confusion and hallucinations

## 2020-09-15 LAB
ACE SERPL-CCNC: 81 U/L (ref 14–82)
CARDIOLIPIN IGA SER IA-ACNC: <9 APL U/ML (ref 0–11)
CARDIOLIPIN IGG SER IA-ACNC: <9 GPL U/ML (ref 0–14)
CARDIOLIPIN IGM SER IA-ACNC: 16 MPL U/ML (ref 0–12)
ENA SS-A AB SER-ACNC: <0.2 AI (ref 0–0.9)
ENA SS-B AB SER-ACNC: <0.2 AI (ref 0–0.9)

## 2020-09-16 DIAGNOSIS — E53.8 B12 DEFICIENCY: Primary | ICD-10-CM

## 2020-09-16 DIAGNOSIS — R85.89: ICD-10-CM

## 2020-09-16 LAB
APTT SCREEN TO CONFIRM RATIO: 1.1 RATIO (ref 0–1.4)
B2 GLYCOPROT1 IGA SER-ACNC: <9 GPI IGA UNITS (ref 0–25)
B2 GLYCOPROT1 IGG SER-ACNC: <9 GPI IGG UNITS (ref 0–20)
B2 GLYCOPROT1 IGM SER-ACNC: 20 GPI IGM UNITS (ref 0–32)
CONFIRM APTT/NORMAL: 37.2 SEC (ref 0–55)
IF BLOCK AB SER QL RIA: 16.4 AU/ML (ref 0–1.1)
LA PPP-IMP: NORMAL
PROT S ACT/NOR PPP: 113 % (ref 63–140)
PROT S ACT/NOR PPP: 116 % (ref 57–157)
PROT S PPP-ACNC: 92 % (ref 60–150)
SCREEN APTT: 40.3 SEC (ref 0–51.9)
SCREEN DRVVT: 45.7 SEC (ref 0–47)
THROMBIN TIME: 18.9 SEC (ref 0–23)

## 2020-09-17 ENCOUNTER — TELEPHONE (OUTPATIENT)
Dept: NEUROLOGY | Facility: CLINIC | Age: 50
End: 2020-09-17

## 2020-09-17 LAB
C-ANCA TITR SER IF: NORMAL TITER
MYELOPEROXIDASE AB SER IA-ACNC: <9 U/ML (ref 0–9)
P-ANCA ATYPICAL TITR SER IF: NORMAL TITER
P-ANCA TITR SER IF: NORMAL TITER
PROTEINASE3 AB SER IA-ACNC: <3.5 U/ML (ref 0–3.5)

## 2020-09-18 ENCOUNTER — OFFICE VISIT (OUTPATIENT)
Dept: GASTROENTEROLOGY | Facility: CLINIC | Age: 50
End: 2020-09-18
Payer: COMMERCIAL

## 2020-09-18 VITALS
DIASTOLIC BLOOD PRESSURE: 80 MMHG | WEIGHT: 294 LBS | BODY MASS INDEX: 44.56 KG/M2 | SYSTOLIC BLOOD PRESSURE: 140 MMHG | TEMPERATURE: 97 F | HEART RATE: 80 BPM | HEIGHT: 68 IN

## 2020-09-18 DIAGNOSIS — E53.8 B12 DEFICIENCY: ICD-10-CM

## 2020-09-18 DIAGNOSIS — Z12.11 SCREENING FOR COLORECTAL CANCER: Primary | ICD-10-CM

## 2020-09-18 DIAGNOSIS — Z12.12 SCREENING FOR COLORECTAL CANCER: Primary | ICD-10-CM

## 2020-09-18 DIAGNOSIS — R85.89: ICD-10-CM

## 2020-09-18 LAB — F2 GENE MUT ANL BLD/T: NORMAL

## 2020-09-18 PROCEDURE — 3079F DIAST BP 80-89 MM HG: CPT | Performed by: INTERNAL MEDICINE

## 2020-09-18 PROCEDURE — 99243 OFF/OP CNSLTJ NEW/EST LOW 30: CPT | Performed by: INTERNAL MEDICINE

## 2020-09-18 NOTE — PROGRESS NOTES
[facility] - Outpatient Consultation  Elton Coleman 52 y o  male MRN: [id number]  Encounter: [id number]          ASSESSMENT AND PLAN:      1  B12 deficiency  - Celiac Disease Antibody Profile; Future  - Vitamin B12; Future    2  Low intrinsic factor in gastric secretions  3  Screening for colorectal cancer      He is a 46-year-old male presents here for evaluation for recent diagnosis of pernicious anemia  He has had neurological symptoms associated with passing out and lightheadedness  He is currently getting replacement therapy with subcutaneous vitamin B12 replacement  He has no active GI issues at this time  No known family history of colorectal cancer  Denies any nausea, vomiting, fevers, chills     -discuss continuing vitamin B12 replacement therapy with repeat labs in 2 months  -will check for celiac disease as well  -colonoscopy at the age of 50  -monitoring of B12 level every 6 months to year this can be performed by PCP  ______________________________________________________________________    HPI:      He is a 46-year-old male presents here for evaluation for recent diagnosis of pernicious anemia  He has had neurological symptoms associated with passing out and lightheadedness  He is currently getting replacement therapy with subcutaneous vitamin B12 replacement  He has no active GI issues at this time  No known family history of colorectal cancer  Denies any nausea, vomiting, fevers, chills  He states his symptoms have improved since vitamin B12 therapy has been started  REVIEW OF SYSTEMS:    CONSTITUTIONAL: Denies any fever, chills, rigors, and weight loss  HEENT: No earache or tinnitus  Denies hearing loss or visual disturbances  CARDIOVASCULAR: No chest pain or palpitations  RESPIRATORY: Denies any cough, hemoptysis, shortness of breath or dyspnea on exertion  GASTROINTESTINAL: As noted in the History of Present Illness     GENITOURINARY: No problems with urination  Denies any hematuria or dysuria  NEUROLOGIC: No dizziness or vertigo, denies headaches  MUSCULOSKELETAL: Denies any muscle or joint pain  SKIN: Denies skin rashes or itching  ENDOCRINE: Denies excessive thirst  Denies intolerance to heat or cold  PSYCHOSOCIAL: Denies depression or anxiety  Denies any recent memory loss  Historical Information   Past Medical History:   Diagnosis Date    Head ache     Hypertension      Past Surgical History:   Procedure Laterality Date    CARPAL TUNNEL RELEASE Left      Social History   Social History     Substance and Sexual Activity   Alcohol Use Yes    Comment: Everyday      Social History     Substance and Sexual Activity   Drug Use No     Social History     Tobacco Use   Smoking Status Current Every Day Smoker    Packs/day: 0 50    Types: Cigarettes   Smokeless Tobacco Current User     Family History   Problem Relation Age of Onset    No Known Problems Mother     No Known Problems Father     Cancer Maternal Grandmother     Cancer Maternal Grandfather     Substance Abuse Brother     Substance Abuse Brother     Alcohol abuse Neg Hx     Mental illness Neg Hx        Meds/Allergies       Current Outpatient Medications:     baclofen 10 mg tablet    cyanocobalamin 1,000 mcg/mL    Cyanocobalamin 1000 MCG SUBL    gabapentin (NEURONTIN) 300 mg capsule    ketorolac (TORADOL) 10 mg tablet    losartan (Cozaar) 50 mg tablet    mometasone (ELOCON) 0 1 % cream    SYRINGE-NEEDLE, DISP, 3 ML 25G X 1" 3 ML MISC    vitamin B-12 (VITAMIN B-12) 1,000 mcg tablet    Current Facility-Administered Medications:     cyanocobalamin injection 1,000 mcg, 1,000 mcg, Intramuscular, Q30 Days, 1,000 mcg at 09/14/20 0841    Allergies   Allergen Reactions    Lisinopril Cough           Objective     Blood pressure 140/80, pulse 80, temperature (!) 97 °F (36 1 °C), temperature source Tympanic, height 5' 8" (1 727 m), weight 133 kg (294 lb)   Body mass index is 44 7 kg/m²         PHYSICAL EXAM:      General Appearance:   Alert, cooperative, no distress   HEENT:   Normocephalic, atraumatic, anicteric      Neck:  Supple, symmetrical, trachea midline   Lungs:   Clear to auscultation bilaterally; no rales, rhonchi or wheezing; respirations unlabored    Heart[de-identified]   Regular rate and rhythm; no murmur, rub, or gallop  Abdomen:   Soft, non-tender, non-distended; normal bowel sounds; no masses, no organomegaly    Genitalia:   Deferred    Rectal:   Deferred    Extremities:  No cyanosis, clubbing or edema    Pulses:  2+ and symmetric    Skin:  No jaundice, rashes, or lesions    Lymph nodes:  No palpable cervical lymphadenopathy        Lab Results:   No visits with results within 1 Day(s) from this visit  Latest known visit with results is:   Appointment on 09/14/2020   Component Date Value    SS-A (RO) Ab 09/14/2020 <0 2     SS-B (LA) Ab 09/14/2020 <0 2     Angio Convert Enzyme 09/14/2020 81     Intrinsic Factor 09/14/2020 16 4*    AntiThrombIN III Activity 09/14/2020 87*    Anticardiolipin IgA 09/14/2020 <9     Anticardiolipin IgG 09/14/2020 <9     Anticardiolipin IgM 09/14/2020 16*    PTT Lupus Anticoagulant 09/14/2020 40 3     Dilute Viper Venom Time 09/14/2020 45 7     DILUTE PROTHROMBIN TIME(* 09/14/2020 37 2     THROMBIN TIME (DRVW) 09/14/2020 18 9     DPT CONFIRM RATIO 09/14/2020 1 10     LUPUS REFLEX INTERPRETAT* 09/14/2020 Comment:     Protein C Activity 09/14/2020 97 0     Protein S Activity 09/14/2020 113     Protein S Ag, Total 09/14/2020 92     Protein S Ag, Free 09/14/2020 116     Beta-2 Glyco 1 IgG 09/14/2020 <9     Beta-2 Glyco 1 IgA 09/14/2020 <9     Beta-2 Glyco 1 IgM 09/14/2020 20     C-ANCA 09/14/2020 <1:20     Atypical pANCA 09/14/2020 <1:20     MPO AB 09/14/2020 <9 0     NH-3 AB 09/14/2020 <3 5     P-ANCA 09/14/2020 <1:20          Radiology Results:   No results found    Answers for HPI/ROS submitted by the patient on 9/17/2020   Abdominal pain  Chronicity: new  Onset: today  Onset quality: undetermined  Frequency: rarely  Progression since onset: resolved  Pain location: RLQ  Pain - numeric: 0/10  Pain quality: cramping  Radiates to: does not radiate  anorexia: No  arthralgias: No  belching: No  constipation: No  diarrhea: No  dysuria: No  fever: No  flatus: Yes  frequency: Yes  headaches: Yes  hematochezia: No  hematuria: No  melena: No  myalgias: Yes  nausea:  No  weight loss: No  vomiting: No  Aggravated by: nothing  Relieved by: nothing

## 2020-09-21 ENCOUNTER — TELEPHONE (OUTPATIENT)
Dept: NEUROLOGY | Facility: CLINIC | Age: 50
End: 2020-09-21

## 2020-09-21 LAB — F5 GENE MUT ANL BLD/T: NORMAL

## 2020-09-21 NOTE — TELEPHONE ENCOUNTER
fyi:    Pt called to clarify if he needs to take b12 inj and sublingual?     Chart reviewed: Advised pt Dr Liborio Segovia sent 2 scripts to [facility] pharmacy  Cyanocobalamin 1000 MCG SUBL  Place 1 tablet (1,000 mcg total) under the tongue daily and cyanocobalamin 1,000 mcg/mL   Take B12 1000 mcg IM once a week for 3 weeks, then once a month for 5 months  Pt verbalized understanding

## 2020-09-23 LAB
Lab: NORMAL
MISCELLANEOUS LAB TEST RESULT: NORMAL

## 2020-09-28 ENCOUNTER — TELEPHONE (OUTPATIENT)
Dept: NEUROLOGY | Facility: CLINIC | Age: 50
End: 2020-09-28

## 2020-09-30 ENCOUNTER — TELEPHONE (OUTPATIENT)
Dept: NEUROLOGY | Facility: CLINIC | Age: 50
End: 2020-09-30

## 2020-09-30 NOTE — TELEPHONE ENCOUNTER
Patient made aware  Advised he follow up with [employer] to confirm they received my fax  Patient verbalized understanding

## 2020-09-30 NOTE — TELEPHONE ENCOUNTER
Patient questioning if we received his STD forms  Informed patient that we did  Completed the first page and faxed to [facility] [place] [facility]\""

## 2020-10-01 ENCOUNTER — HOSPITAL ENCOUNTER (OUTPATIENT)
Dept: NON INVASIVE DIAGNOSTICS | Facility: HOSPITAL | Age: 50
Discharge: HOME | End: 2020-10-01
Attending: PSYCHIATRY & NEUROLOGY
Payer: COMMERCIAL

## 2020-10-01 DIAGNOSIS — R55 NEAR SYNCOPE: ICD-10-CM

## 2020-10-01 PROCEDURE — 93880 EXTRACRANIAL BILAT STUDY: CPT | Performed by: SURGERY

## 2020-10-01 PROCEDURE — 93880 EXTRACRANIAL BILAT STUDY: CPT

## 2020-10-06 ENCOUNTER — CONSULT (OUTPATIENT)
Dept: CARDIOLOGY CLINIC | Facility: CLINIC | Age: 50
End: 2020-10-06
Payer: COMMERCIAL

## 2020-10-06 VITALS
DIASTOLIC BLOOD PRESSURE: 82 MMHG | BODY MASS INDEX: 43.62 KG/M2 | WEIGHT: 287.8 LBS | HEIGHT: 68 IN | TEMPERATURE: 98.7 F | SYSTOLIC BLOOD PRESSURE: 132 MMHG | HEART RATE: 84 BPM

## 2020-10-06 DIAGNOSIS — R55 NEAR SYNCOPE: ICD-10-CM

## 2020-10-06 DIAGNOSIS — I10 ESSENTIAL HYPERTENSION: Primary | ICD-10-CM

## 2020-10-06 DIAGNOSIS — R60.9 EDEMA, UNSPECIFIED TYPE: ICD-10-CM

## 2020-10-06 PROCEDURE — 99214 OFFICE O/P EST MOD 30 MIN: CPT | Performed by: INTERNAL MEDICINE

## 2020-10-07 ENCOUNTER — HOSPITAL ENCOUNTER (OUTPATIENT)
Dept: NON INVASIVE DIAGNOSTICS | Age: 50
Discharge: HOME | End: 2020-10-07
Payer: COMMERCIAL

## 2020-10-07 DIAGNOSIS — R60.9 EDEMA, UNSPECIFIED TYPE: ICD-10-CM

## 2020-10-07 DIAGNOSIS — R55 NEAR SYNCOPE: ICD-10-CM

## 2020-10-07 DIAGNOSIS — I10 ESSENTIAL HYPERTENSION: ICD-10-CM

## 2020-10-07 PROCEDURE — 93306 TTE W/DOPPLER COMPLETE: CPT

## 2020-10-07 PROCEDURE — 93306 TTE W/DOPPLER COMPLETE: CPT | Performed by: INTERNAL MEDICINE

## 2020-10-07 RX ADMIN — PERFLUTREN 0.8 ML/MIN: 6.52 INJECTION, SUSPENSION INTRAVENOUS at 11:19

## 2020-10-16 ENCOUNTER — TELEPHONE (OUTPATIENT)
Dept: CARDIOLOGY CLINIC | Facility: CLINIC | Age: 50
End: 2020-10-16

## 2020-10-16 DIAGNOSIS — Z20.822 ENCOUNTER FOR LABORATORY TESTING FOR COVID-19 VIRUS: ICD-10-CM

## 2020-10-16 DIAGNOSIS — E53.8 B12 DEFICIENCY: ICD-10-CM

## 2020-10-16 DIAGNOSIS — G44.229 CHRONIC TENSION-TYPE HEADACHE, NOT INTRACTABLE: ICD-10-CM

## 2020-10-16 PROCEDURE — U0003 INFECTIOUS AGENT DETECTION BY NUCLEIC ACID (DNA OR RNA); SEVERE ACUTE RESPIRATORY SYNDROME CORONAVIRUS 2 (SARS-COV-2) (CORONAVIRUS DISEASE [COVID-19]), AMPLIFIED PROBE TECHNIQUE, MAKING USE OF HIGH THROUGHPUT TECHNOLOGIES AS DESCRIBED BY CMS-2020-01-R: HCPCS | Performed by: INTERNAL MEDICINE

## 2020-10-17 LAB — SARS-COV-2 RNA SPEC QL NAA+PROBE: NOT DETECTED

## 2020-10-18 RX ORDER — CYANOCOBALAMIN 1000 UG/ML
INJECTION INTRAMUSCULAR; SUBCUTANEOUS
Qty: 3 ML | Refills: 2 | Status: SHIPPED | OUTPATIENT
Start: 2020-10-18

## 2020-10-26 ENCOUNTER — HOSPITAL ENCOUNTER (OUTPATIENT)
Dept: SLEEP CENTER | Facility: CLINIC | Age: 50
Discharge: HOME | End: 2020-10-26
Payer: COMMERCIAL

## 2020-10-26 DIAGNOSIS — G44.52 NEW DAILY PERSISTENT HEADACHE: ICD-10-CM

## 2020-10-26 DIAGNOSIS — G47.19 EXCESSIVE DAYTIME SLEEPINESS: ICD-10-CM

## 2020-10-26 DIAGNOSIS — G47.33 OSA (OBSTRUCTIVE SLEEP APNEA): ICD-10-CM

## 2020-10-26 PROCEDURE — 95811 POLYSOM 6/>YRS CPAP 4/> PARM: CPT

## 2020-10-27 ENCOUNTER — OFFICE VISIT (OUTPATIENT)
Dept: NEUROLOGY | Facility: CLINIC | Age: 50
End: 2020-10-27
Payer: COMMERCIAL

## 2020-10-27 VITALS
WEIGHT: 285.2 LBS | BODY MASS INDEX: 43.22 KG/M2 | DIASTOLIC BLOOD PRESSURE: 75 MMHG | RESPIRATION RATE: 16 BRPM | HEIGHT: 68 IN | HEART RATE: 94 BPM | SYSTOLIC BLOOD PRESSURE: 127 MMHG | TEMPERATURE: 99.1 F

## 2020-10-27 DIAGNOSIS — R55 NEAR SYNCOPE: ICD-10-CM

## 2020-10-27 DIAGNOSIS — G47.33 OSA (OBSTRUCTIVE SLEEP APNEA): ICD-10-CM

## 2020-10-27 DIAGNOSIS — G44.52 NEW DAILY PERSISTENT HEADACHE: ICD-10-CM

## 2020-10-27 DIAGNOSIS — G95.9 CERVICAL MYELOPATHY (HCC): Primary | ICD-10-CM

## 2020-10-27 DIAGNOSIS — G47.33 OSA (OBSTRUCTIVE SLEEP APNEA): Primary | ICD-10-CM

## 2020-10-27 DIAGNOSIS — E53.8 B12 DEFICIENCY: ICD-10-CM

## 2020-10-27 DIAGNOSIS — R85.89: ICD-10-CM

## 2020-10-27 PROCEDURE — 3078F DIAST BP <80 MM HG: CPT | Performed by: PSYCHIATRY & NEUROLOGY

## 2020-10-27 PROCEDURE — 3008F BODY MASS INDEX DOCD: CPT | Performed by: PSYCHIATRY & NEUROLOGY

## 2020-10-27 PROCEDURE — 99215 OFFICE O/P EST HI 40 MIN: CPT | Performed by: PSYCHIATRY & NEUROLOGY

## 2020-10-27 PROCEDURE — 3074F SYST BP LT 130 MM HG: CPT | Performed by: PSYCHIATRY & NEUROLOGY

## 2020-11-05 ENCOUNTER — TELEPHONE (OUTPATIENT)
Dept: SLEEP CENTER | Facility: CLINIC | Age: 50
End: 2020-11-05

## 2020-11-06 ENCOUNTER — TELEPHONE (OUTPATIENT)
Dept: SLEEP CENTER | Facility: CLINIC | Age: 50
End: 2020-11-06

## 2020-12-07 ENCOUNTER — TELEMEDICINE (OUTPATIENT)
Dept: FAMILY MEDICINE CLINIC | Facility: CLINIC | Age: 50
End: 2020-12-07
Payer: COMMERCIAL

## 2020-12-07 VITALS — TEMPERATURE: 97 F

## 2020-12-07 DIAGNOSIS — Z20.822 EXPOSURE TO COVID-19 VIRUS: ICD-10-CM

## 2020-12-07 DIAGNOSIS — B34.9 VIRAL INFECTION, UNSPECIFIED: ICD-10-CM

## 2020-12-07 PROCEDURE — 99213 OFFICE O/P EST LOW 20 MIN: CPT | Performed by: PHYSICIAN ASSISTANT

## 2020-12-07 PROCEDURE — U0003 INFECTIOUS AGENT DETECTION BY NUCLEIC ACID (DNA OR RNA); SEVERE ACUTE RESPIRATORY SYNDROME CORONAVIRUS 2 (SARS-COV-2) (CORONAVIRUS DISEASE [COVID-19]), AMPLIFIED PROBE TECHNIQUE, MAKING USE OF HIGH THROUGHPUT TECHNOLOGIES AS DESCRIBED BY CMS-2020-01-R: HCPCS | Performed by: PHYSICIAN ASSISTANT

## 2020-12-08 ENCOUNTER — TELEPHONE (OUTPATIENT)
Dept: FAMILY MEDICINE CLINIC | Facility: CLINIC | Age: 50
End: 2020-12-08

## 2020-12-08 LAB — SARS-COV-2 RNA SPEC QL NAA+PROBE: NOT DETECTED

## 2021-01-29 ENCOUNTER — TELEPHONE (OUTPATIENT)
Dept: NEUROLOGY | Facility: CLINIC | Age: 51
End: 2021-01-29
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
## 2021-01-31 ENCOUNTER — TELEPHONE (OUTPATIENT)
Dept: NEUROLOGY | Facility: CLINIC | Age: 51
End: 2021-01-31

## 2021-02-01 ENCOUNTER — TELEPHONE (OUTPATIENT)
Dept: NEUROLOGY | Facility: CLINIC | Age: 51
End: 2021-02-01

## 2021-02-01 NOTE — TELEPHONE ENCOUNTER
Called the patient - no answer, same with Mrs Chapo Witt -  patient is [facility] for today

## 2021-02-01 NOTE — TELEPHONE ENCOUNTER
LM on patient voicemail informing that office is closed on 2/2  Would like to offer Virtual via [employer] if possible or telephone as last resort  Patient does not have email on file  Advised to call office in AM to discuss which visit format he would like

## 2021-02-22 ENCOUNTER — TELEPHONE (OUTPATIENT)
Dept: FAMILY MEDICINE CLINIC | Facility: CLINIC | Age: 51
End: 2021-02-22

## 2021-02-22 NOTE — TELEPHONE ENCOUNTER
Pt was with nephew yesterday Davian Victoria) and found out today that nephew tested positive for covid  Pt was advised to quarantine for 10 days and to call if he develops any symptoms (went over list with him)  Also advised that if he remains symptom free and wants to be tested to wait until Fri (5 days) to schedule a video visit

## 2021-02-23 ENCOUNTER — TELEPHONE (OUTPATIENT)
Dept: FAMILY MEDICINE CLINIC | Facility: CLINIC | Age: 51
End: 2021-02-23

## 2021-02-23 NOTE — TELEPHONE ENCOUNTER
Patient called back he now is starting a cough and is fatigue and feels a little congested  Has been laying around all day  I did mention to patient I could schedule with [facility] for virtual but patient wants to see how he feels tomorrow      He would definitely like to go get a test Thursday

## 2021-02-23 NOTE — TELEPHONE ENCOUNTER
Patient was last exposed to someone who tested positive on 2/20  I did explain to patient to wait till 5 days after exposure with no symptoms then he could go for a test     Patient is asking if you could enter a script for him to go for a test on Thursday      Did explain to patient if he starts any symptoms to call us we would set him up for a virtual and then send him for a test

## 2021-02-24 ENCOUNTER — TELEMEDICINE (OUTPATIENT)
Dept: FAMILY MEDICINE CLINIC | Facility: CLINIC | Age: 51
End: 2021-02-24
Payer: COMMERCIAL

## 2021-02-24 VITALS — BODY MASS INDEX: 41.83 KG/M2 | WEIGHT: 276 LBS | HEIGHT: 68 IN

## 2021-02-24 DIAGNOSIS — Z20.822 EXPOSURE TO COVID-19 VIRUS: ICD-10-CM

## 2021-02-24 DIAGNOSIS — E66.01 MORBID OBESITY WITH BMI OF 40.0-44.9, ADULT (HCC): ICD-10-CM

## 2021-02-24 DIAGNOSIS — B34.9 VIRAL INFECTION, UNSPECIFIED: Primary | ICD-10-CM

## 2021-02-24 PROCEDURE — 99442 PR PHYS/QHP TELEPHONE EVALUATION 11-20 MIN: CPT | Performed by: PHYSICIAN ASSISTANT

## 2021-02-24 NOTE — PROGRESS NOTES
COVID-19 Virtual Visit     Assessment/Plan:    1  Viral infection, unspecified    - will go for testing tomorrow morning at 8 am, patient is to stay home, fluids, rest, reassurance, if symptoms change should call  - Novel Coronavirus (Covid-19),PCR [facility] - Collected at [place] [address] or [facility]; Future    2  Exposure to COVID-19 virus    - Novel Coronavirus (Covid-19),PCR [facility] - Collected at [place] [address] or [facility]; Future    3  Morbid obesity with BMI of 40 0-44 9, adult ([facility] [address] )    - encouraged patient to work on [employer], exercise  and adequate sleep for weight loss  Follow up if more help is needed       Disposition:     I referred patient to one of our centralized sites for a COVID-19 swab  I have spent 15 minutes directly with the patient  Greater than 50% of this time was spent in counseling/coordination of care regarding: diagnostic results, prognosis, risks and benefits of treatment options, instructions for management, patient and family education, importance of treatment compliance, risk factor reductions and impressions  Encounter provider Anitra Andino PA-C    Provider located at [address] [place] [id number] [phone]    Recent Visits  Date Type Provider Dept   02/23/21 Telephone Anitra Andino PA-C [address] [facility] [address] [facility]   02/22/21 Telephone Anitra Andino PA-C [address] recent visits within past 7 days and meeting all other requirements     Today's Visits  Date Type Provider Dept   02/24/21 Telemedicine Anitra Andino PA-C [address] [facility]   Showing today's visits and meeting all other requirements     Future Appointments  No visits were found meeting these conditions     Showing future appointments within next 150 days and meeting all other requirements        Patient agrees to participate in a virtual check in via telephone or video visit instead of presenting to the office to address urgent/immediate medical needs  Patient is aware this is a billable service  After connecting through Telephone, the patient was identified by name and date of birth  Moris Harper was informed that this was a telemedicine visit and that the exam was being conducted confidentially over secure lines  My office door was closed  No one else was in the room  Moris Harper acknowledged consent and understanding of privacy and security of the telemedicine visit  I informed the patient that I have reviewed his record in Epic and presented the opportunity for him to ask any questions regarding the visit today  The patient agreed to participate  Subjective:   Moris Harper is a 52 y o  male who is concerned about COVID-19  Patient's symptoms include fatigue, nasal congestion, rhinorrhea, cough, myalgias and headache  Patient denies fever, chills, malaise, sore throat, anosmia, loss of taste, shortness of breath, chest tightness, abdominal pain, nausea, vomiting and diarrhea       Date of symptom onset: 2/23/2021  Date of exposure: 2/21/2021    Exposure:   Contact with a person who is under investigation (PUI) for or who is positive for COVID-19 within the last 14 days?: Yes    Hospitalized recently for fever and/or lower respiratory symptoms?: No      Currently a healthcare worker that is involved in direct patient care?: No      Works in a special setting where the risk of COVID-19 transmission may be high? (this may include long-term care, correctional and senior care facilities; homeless shelters; assisted-living facilities and group homes ): No      Resident in a special setting where the risk of COVID-19 transmission may be high? (this may include long-term care, correctional and senior care facilities; homeless shelters; assisted-living facilities and group homes ): No      Patient was sledding with nephew on Sunday, that night came down with cough and fever, Tuesday patient began not feeling well, cough, congestion, achy, headache  No thermometer to check temp  Lab Results   Component Value Date    SARSCOV2 Not Detected 12/07/2020     Past Medical History:   Diagnosis Date    Head ache     Hypertension      Past Surgical History:   Procedure Laterality Date    CARPAL TUNNEL RELEASE Left      Current Outpatient Medications   Medication Sig Dispense Refill    cyanocobalamin 1,000 mcg/mL INJECT ONCE WEEKLY FOR 3 WEEKS, THEN ONCE MONTHLY FOR 5 MONTHS 3 mL 2    Cyanocobalamin 1000 MCG SUBL Place 1 tablet (1,000 mcg total) under the tongue daily 90 tablet 0    gabapentin (NEURONTIN) 300 mg capsule By mouth take 1 capsule 3 times daily or as directed 90 capsule 2    losartan (Cozaar) 50 mg tablet Take 1 tablet (50 mg total) by mouth daily 90 tablet 3    SYRINGE-NEEDLE, DISP, 3 ML 25G X 1" 3 ML MISC Inject into a muscle every 28 days 8 each 0    ketorolac (TORADOL) 10 mg tablet Take 1 tablet (10 mg total) by mouth every 6 (six) hours as needed for severe pain 20 tablet 0     Current Facility-Administered Medications   Medication Dose Route Frequency Provider Last Rate Last Admin    cyanocobalamin injection 1,000 mcg  1,000 mcg Intramuscular Q30 Days Sylvie Rosario MD   1,000 mcg at 09/14/20 0841     Allergies   Allergen Reactions    Lisinopril Cough       Review of Systems   Constitutional: Positive for fatigue  Negative for chills and fever  HENT: Positive for congestion and rhinorrhea  Negative for sore throat  Respiratory: Positive for cough  Negative for chest tightness and shortness of breath  Gastrointestinal: Negative for abdominal pain, diarrhea, nausea and vomiting  Musculoskeletal: Positive for myalgias  Neurological: Positive for headaches  Objective:    Vitals:    02/24/21 0753   Weight: 125 kg (276 lb)   Height: 5' 8" (1 727 m)       Physical Exam  VIRTUAL VISIT DISCLAIMER    BMI Counseling:  Body mass index is 41 97 kg/m²  The BMI is above normal  Nutrition recommendations include reducing portion sizes  Jahaira Lord acknowledges that he has consented to an online visit or consultation  He understands that the online visit is based solely on information provided by him, and that, in the absence of a face-to-face physical evaluation by the physician, the diagnosis he receives is both limited and provisional in terms of accuracy and completeness  This is not intended to replace a full medical face-to-face evaluation by the physician  Jahaira Lord understands and accepts these terms

## 2021-02-25 DIAGNOSIS — B34.9 VIRAL INFECTION, UNSPECIFIED: ICD-10-CM

## 2021-02-25 DIAGNOSIS — Z20.822 EXPOSURE TO COVID-19 VIRUS: ICD-10-CM

## 2021-02-25 LAB — SARS-COV-2 RNA RESP QL NAA+PROBE: NEGATIVE

## 2021-02-25 PROCEDURE — U0003 INFECTIOUS AGENT DETECTION BY NUCLEIC ACID (DNA OR RNA); SEVERE ACUTE RESPIRATORY SYNDROME CORONAVIRUS 2 (SARS-COV-2) (CORONAVIRUS DISEASE [COVID-19]), AMPLIFIED PROBE TECHNIQUE, MAKING USE OF HIGH THROUGHPUT TECHNOLOGIES AS DESCRIBED BY CMS-2020-01-R: HCPCS | Performed by: PHYSICIAN ASSISTANT

## 2021-02-25 PROCEDURE — U0005 INFEC AGEN DETEC AMPLI PROBE: HCPCS | Performed by: PHYSICIAN ASSISTANT

## 2021-02-26 ENCOUNTER — TELEPHONE (OUTPATIENT)
Dept: FAMILY MEDICINE CLINIC | Facility: CLINIC | Age: 51
End: 2021-02-26

## 2021-02-26 NOTE — TELEPHONE ENCOUNTER
Yes, as per my instructions on his results and our conversation at his visit   He was exposed without a mask on 2/21/2021  10 days from this takes us to the 28, he can return to work on 3/1/2021

## 2021-02-26 NOTE — TELEPHONE ENCOUNTER
Patient got a negative covid test result yesterday, 2/25/2021  Does he need to quarantine until Monday? Please response to [facility]      [phone]

## 2021-02-26 NOTE — TELEPHONE ENCOUNTER
----- Message from Sabrina Yun PA-C sent at 2/25/2021  7:59 PM EST -----  Please let patient know his test is negative, no covid  Can return to work on Monday

## 2021-05-03 ENCOUNTER — TELEMEDICINE (OUTPATIENT)
Dept: FAMILY MEDICINE CLINIC | Facility: CLINIC | Age: 51
End: 2021-05-03
Payer: COMMERCIAL

## 2021-05-03 VITALS — TEMPERATURE: 97.9 F | BODY MASS INDEX: 40.77 KG/M2 | WEIGHT: 269 LBS | HEIGHT: 68 IN

## 2021-05-03 DIAGNOSIS — B34.9 VIRAL INFECTION, UNSPECIFIED: ICD-10-CM

## 2021-05-03 DIAGNOSIS — B34.9 VIRAL INFECTION, UNSPECIFIED: Primary | ICD-10-CM

## 2021-05-03 PROCEDURE — U0003 INFECTIOUS AGENT DETECTION BY NUCLEIC ACID (DNA OR RNA); SEVERE ACUTE RESPIRATORY SYNDROME CORONAVIRUS 2 (SARS-COV-2) (CORONAVIRUS DISEASE [COVID-19]), AMPLIFIED PROBE TECHNIQUE, MAKING USE OF HIGH THROUGHPUT TECHNOLOGIES AS DESCRIBED BY CMS-2020-01-R: HCPCS | Performed by: PHYSICIAN ASSISTANT

## 2021-05-03 PROCEDURE — U0005 INFEC AGEN DETEC AMPLI PROBE: HCPCS | Performed by: PHYSICIAN ASSISTANT

## 2021-05-03 PROCEDURE — 99213 OFFICE O/P EST LOW 20 MIN: CPT | Performed by: PHYSICIAN ASSISTANT

## 2021-05-03 RX ORDER — ASPIRIN 81 MG/1
81 TABLET, CHEWABLE ORAL DAILY
COMMUNITY

## 2021-05-03 NOTE — PROGRESS NOTES
COVID-19 Outpatient Progress Note    Assessment/Plan:    1  Viral infection, unspecified    - will send patient for covid testing, after testing will return home and isolate until results are in, BRAT diet, fluids, rest, Tylenol as needed  - Novel Coronavirus (Covid-19),PCR [facility] - Collected at [facility] or [facility]; Future    F/u as needed     Disposition:     I referred patient to one of our centralized sites for a COVID-19 swab  I have spent 15 minutes directly with the patient  Greater than 50% of this time was spent in counseling/coordination of care regarding: diagnostic results, prognosis, risks and benefits of treatment options, instructions for management, patient and family education, importance of treatment compliance, risk factor reductions and impressions  Encounter provider Thong Gutiérrez PA-C    Provider located at [place] [address]  [address] [facility] [address]  [address]  [phone]    Recent Visits  No visits were found meeting these conditions  Showing recent visits within past 7 days and meeting all other requirements     Today's Visits  Date Type Provider Dept   05/03/21 Telemedicine Thong Gutiérrez PA-C [facility] today's visits and meeting all other requirements     Future Appointments  No visits were found meeting these conditions  Showing future appointments within next 150 days and meeting all other requirements      This virtual check-in was done via [employer] and patient was informed that this is not a secure, HIPAA-compliant platform  He agrees to proceed  Patient agrees to participate in a virtual check in via telephone or video visit instead of presenting to the office to address urgent/immediate medical needs  Patient is aware this is a billable service  After connecting through [facility], the patient was identified by name and date of birth   Trey Dugan Shayy Beard was informed that this was a telemedicine visit and that the exam was being conducted confidentially over secure lines  My office door was closed  No one else was in the room  Una Blair acknowledged consent and understanding of privacy and security of the telemedicine visit  I informed the patient that I have reviewed his record in Epic and presented the opportunity for him to ask any questions regarding the visit today  The patient agreed to participate  Subjective:   Una Blair is a 52 y o  male who is concerned about COVID-19  Patient's symptoms include fatigue, malaise, nasal congestion, cough, chest tightness, nausea, vomiting, myalgias and headache  Patient denies fever, chills, rhinorrhea, sore throat, anosmia, loss of taste, shortness of breath, abdominal pain and diarrhea  Date of symptom onset: 5/2/2021    Exposure:   Contact with a person who is under investigation (PUI) for or who is positive for COVID-19 within the last 14 days?: No    Hospitalized recently for fever and/or lower respiratory symptoms?: No      Currently a healthcare worker that is involved in direct patient care?: No      Works in a special setting where the risk of COVID-19 transmission may be high? (this may include long-term care, correctional and intermediate facilities; homeless shelters; assisted-living facilities and group homes ): No      Resident in a special setting where the risk of COVID-19 transmission may be high? (this may include long-term care, correctional and intermediate facilities; homeless shelters; assisted-living facilities and group homes ): No      Yesterday woke up feeling well went to grocery store  By the time he got home was not feeling well  Began with vomiting, nausea, achy, fatigue, dry cough and tight chest  Does not feel short of breath or wheeze  Called work and advised he needed to be covid tested  Denies sick contacts      Lab Results   Component Value Date    SARSCOV2 Negative 02/25/2021    SARSCOV2 Not Detected 12/07/2020     Past Medical History:   Diagnosis Date    Head ache     Hypertension      Past Surgical History:   Procedure Laterality Date    CARPAL TUNNEL RELEASE Left      Current Outpatient Medications   Medication Sig Dispense Refill    aspirin 81 mg chewable tablet Chew 81 mg daily      cyanocobalamin 1,000 mcg/mL INJECT ONCE WEEKLY FOR 3 WEEKS, THEN ONCE MONTHLY FOR 5 MONTHS 3 mL 2    Cyanocobalamin 1000 MCG SUBL Place 1 tablet (1,000 mcg total) under the tongue daily 90 tablet 0    gabapentin (NEURONTIN) 300 mg capsule By mouth take 1 capsule 3 times daily or as directed 90 capsule 2    ketorolac (TORADOL) 10 mg tablet Take 1 tablet (10 mg total) by mouth every 6 (six) hours as needed for severe pain 20 tablet 0    losartan (Cozaar) 50 mg tablet Take 1 tablet (50 mg total) by mouth daily 90 tablet 3    SYRINGE-NEEDLE, DISP, 3 ML 25G X 1" 3 ML MISC Inject into a muscle every 28 days 8 each 0     Current Facility-Administered Medications   Medication Dose Route Frequency Provider Last Rate Last Admin    cyanocobalamin injection 1,000 mcg  1,000 mcg Intramuscular Q30 Days Jose Delgado MD   1,000 mcg at 09/14/20 0841     Allergies   Allergen Reactions    Lisinopril Cough       Review of Systems   Constitutional: Positive for fatigue  Negative for chills and fever  HENT: Positive for congestion  Negative for rhinorrhea and sore throat  Respiratory: Positive for cough and chest tightness  Negative for shortness of breath  Gastrointestinal: Positive for nausea and vomiting  Negative for abdominal pain and diarrhea  Musculoskeletal: Positive for myalgias  Neurological: Positive for headaches  Objective:    Vitals:    05/03/21 0843   Temp: 97 9 °F (36 6 °C)   TempSrc: Oral   Weight: 122 kg (269 lb)   Height: 5' 8" (1 727 m)       Physical Exam  Constitutional:       General: He is not in acute distress  Appearance: He is ill-appearing   He is not toxic-appearing or diaphoretic  Pulmonary:      Effort: Pulmonary effort is normal  No respiratory distress  Skin:     Coloration: Skin is not jaundiced  Neurological:      General: No focal deficit present  Mental Status: He is alert and oriented to person, place, and time  Psychiatric:         Behavior: Behavior normal          Thought Content: Thought content normal          Judgment: Judgment normal        VIRTUAL VISIT DISCLAIMER    Piotrfallonsweta Catrachitayvette acknowledges that he has consented to an online visit or consultation  He understands that the online visit is based solely on information provided by him, and that, in the absence of a face-to-face physical evaluation by the physician, the diagnosis he receives is both limited and provisional in terms of accuracy and completeness  This is not intended to replace a full medical face-to-face evaluation by the physician  Estefany Caballero understands and accepts these terms  BMI Counseling: Body mass index is 40 9 kg/m²  The BMI is above normal  Nutrition recommendations include reducing portion sizes

## 2021-05-04 LAB — SARS-COV-2 RNA RESP QL NAA+PROBE: NEGATIVE

## 2021-05-05 ENCOUNTER — TELEPHONE (OUTPATIENT)
Dept: FAMILY MEDICINE CLINIC | Facility: CLINIC | Age: 51
End: 2021-05-05

## 2021-05-05 NOTE — TELEPHONE ENCOUNTER
----- Message from Chandaan Simpson PA-C sent at 5/4/2021  9:48 PM EDT -----  Please let patient know his test was negative, can return to work if feeling better

## 2021-05-06 ENCOUNTER — TELEPHONE (OUTPATIENT)
Dept: FAMILY MEDICINE CLINIC | Facility: CLINIC | Age: 51
End: 2021-05-06

## 2021-05-06 NOTE — TELEPHONE ENCOUNTER
Phi called wanting to make an appointment with you  He describes that he has chest pain for several days  After questioning this pain never eases up and is located "right over my heart"  He resisted going to the ER because he states he has a $500 copay  I advised him that he had to go to the ER and go now and that I would be advising you and you would be looking for a note from the hospital that he actually went and got checked

## 2021-05-10 NOTE — TELEPHONE ENCOUNTER
Called patient's phone  Left voicemail asking patient to return the call although I never got a beep prompt

## 2021-06-15 DIAGNOSIS — I10 ESSENTIAL HYPERTENSION: ICD-10-CM

## 2021-06-15 RX ORDER — LOSARTAN POTASSIUM 50 MG/1
50 TABLET ORAL DAILY
Qty: 90 TABLET | Refills: 0 | Status: SHIPPED | OUTPATIENT
Start: 2021-06-15 | End: 2021-09-10 | Stop reason: SDUPTHER

## 2021-06-19 ENCOUNTER — APPOINTMENT (OUTPATIENT)
Dept: LAB | Facility: HOSPITAL | Age: 51
End: 2021-06-19
Payer: COMMERCIAL

## 2021-06-19 DIAGNOSIS — I10 ESSENTIAL HYPERTENSION: ICD-10-CM

## 2021-06-19 DIAGNOSIS — R69 TAKING MEDICATION FOR CHRONIC DISEASE: ICD-10-CM

## 2021-06-19 LAB
ALBUMIN SERPL BCP-MCNC: 3.8 G/DL (ref 3.5–5)
ALP SERPL-CCNC: 75 U/L (ref 46–116)
ALT SERPL W P-5'-P-CCNC: 85 U/L (ref 12–78)
ANION GAP SERPL CALCULATED.3IONS-SCNC: 3 MMOL/L (ref 4–13)
AST SERPL W P-5'-P-CCNC: 34 U/L (ref 5–45)
BASOPHILS # BLD AUTO: 0.09 THOUSANDS/ΜL (ref 0–0.1)
BASOPHILS NFR BLD AUTO: 2 % (ref 0–1)
BILIRUB SERPL-MCNC: 0.53 MG/DL (ref 0.2–1)
BILIRUB UR QL STRIP: NEGATIVE
BUN SERPL-MCNC: 12 MG/DL (ref 5–25)
CALCIUM SERPL-MCNC: 8.9 MG/DL (ref 8.3–10.1)
CHLORIDE SERPL-SCNC: 107 MMOL/L (ref 100–108)
CHOLEST SERPL-MCNC: 166 MG/DL (ref 50–200)
CLARITY UR: CLEAR
CO2 SERPL-SCNC: 29 MMOL/L (ref 21–32)
COLOR UR: YELLOW
CREAT SERPL-MCNC: 0.86 MG/DL (ref 0.6–1.3)
EOSINOPHIL # BLD AUTO: 0.11 THOUSAND/ΜL (ref 0–0.61)
EOSINOPHIL NFR BLD AUTO: 2 % (ref 0–6)
ERYTHROCYTE [DISTWIDTH] IN BLOOD BY AUTOMATED COUNT: 12 % (ref 11.6–15.1)
GFR SERPL CREATININE-BSD FRML MDRD: 103 ML/MIN/1.73SQ M
GLUCOSE P FAST SERPL-MCNC: 100 MG/DL (ref 65–99)
GLUCOSE UR STRIP-MCNC: NEGATIVE MG/DL
HCT VFR BLD AUTO: 47.5 % (ref 36.5–49.3)
HDLC SERPL-MCNC: 34 MG/DL
HGB BLD-MCNC: 15.9 G/DL (ref 12–17)
HGB UR QL STRIP.AUTO: NEGATIVE
IMM GRANULOCYTES # BLD AUTO: 0.01 THOUSAND/UL (ref 0–0.2)
IMM GRANULOCYTES NFR BLD AUTO: 0 % (ref 0–2)
KETONES UR STRIP-MCNC: NEGATIVE MG/DL
LDLC SERPL CALC-MCNC: 113 MG/DL (ref 0–100)
LEUKOCYTE ESTERASE UR QL STRIP: NEGATIVE
LYMPHOCYTES # BLD AUTO: 0.99 THOUSANDS/ΜL (ref 0.6–4.47)
LYMPHOCYTES NFR BLD AUTO: 21 % (ref 14–44)
MCH RBC QN AUTO: 31.4 PG (ref 26.8–34.3)
MCHC RBC AUTO-ENTMCNC: 33.5 G/DL (ref 31.4–37.4)
MCV RBC AUTO: 94 FL (ref 82–98)
MONOCYTES # BLD AUTO: 0.63 THOUSAND/ΜL (ref 0.17–1.22)
MONOCYTES NFR BLD AUTO: 14 % (ref 4–12)
NEUTROPHILS # BLD AUTO: 2.79 THOUSANDS/ΜL (ref 1.85–7.62)
NEUTS SEG NFR BLD AUTO: 61 % (ref 43–75)
NITRITE UR QL STRIP: NEGATIVE
NRBC BLD AUTO-RTO: 0 /100 WBCS
PH UR STRIP.AUTO: 7 [PH]
PLATELET # BLD AUTO: 194 THOUSANDS/UL (ref 149–390)
PMV BLD AUTO: 10.1 FL (ref 8.9–12.7)
POTASSIUM SERPL-SCNC: 4.3 MMOL/L (ref 3.5–5.3)
PROT SERPL-MCNC: 6.6 G/DL (ref 6.4–8.2)
PROT UR STRIP-MCNC: NEGATIVE MG/DL
RBC # BLD AUTO: 5.07 MILLION/UL (ref 3.88–5.62)
SODIUM SERPL-SCNC: 139 MMOL/L (ref 136–145)
SP GR UR STRIP.AUTO: 1.02 (ref 1–1.03)
TRIGL SERPL-MCNC: 94 MG/DL
TSH SERPL DL<=0.05 MIU/L-ACNC: 1.37 UIU/ML (ref 0.36–3.74)
UROBILINOGEN UR QL STRIP.AUTO: 0.2 E.U./DL
WBC # BLD AUTO: 4.62 THOUSAND/UL (ref 4.31–10.16)

## 2021-06-19 PROCEDURE — 80053 COMPREHEN METABOLIC PANEL: CPT

## 2021-06-19 PROCEDURE — 84443 ASSAY THYROID STIM HORMONE: CPT

## 2021-06-19 PROCEDURE — 85025 COMPLETE CBC W/AUTO DIFF WBC: CPT

## 2021-06-19 PROCEDURE — 80061 LIPID PANEL: CPT

## 2021-06-19 PROCEDURE — 81003 URINALYSIS AUTO W/O SCOPE: CPT

## 2021-06-19 PROCEDURE — 36415 COLL VENOUS BLD VENIPUNCTURE: CPT

## 2021-09-10 ENCOUNTER — OFFICE VISIT (OUTPATIENT)
Dept: FAMILY MEDICINE CLINIC | Facility: CLINIC | Age: 51
End: 2021-09-10
Payer: COMMERCIAL

## 2021-09-10 VITALS
WEIGHT: 264.8 LBS | HEIGHT: 68 IN | RESPIRATION RATE: 16 BRPM | BODY MASS INDEX: 40.13 KG/M2 | TEMPERATURE: 98.4 F | HEART RATE: 84 BPM | OXYGEN SATURATION: 99 % | DIASTOLIC BLOOD PRESSURE: 82 MMHG | SYSTOLIC BLOOD PRESSURE: 128 MMHG

## 2021-09-10 DIAGNOSIS — G95.19 EDEMA OF SPINAL CORD (HCC): ICD-10-CM

## 2021-09-10 DIAGNOSIS — L24.2 IRRITANT CONTACT DERMATITIS DUE TO SOLVENT: ICD-10-CM

## 2021-09-10 DIAGNOSIS — G95.9 CERVICAL MYELOPATHY (HCC): ICD-10-CM

## 2021-09-10 DIAGNOSIS — I10 ESSENTIAL HYPERTENSION: Primary | ICD-10-CM

## 2021-09-10 DIAGNOSIS — G47.33 OSA (OBSTRUCTIVE SLEEP APNEA): ICD-10-CM

## 2021-09-10 DIAGNOSIS — E53.8 B12 DEFICIENCY: ICD-10-CM

## 2021-09-10 DIAGNOSIS — Q04.8 CEREBELLAR TONSILLAR ECTOPIA (HCC): ICD-10-CM

## 2021-09-10 DIAGNOSIS — E66.01 MORBID OBESITY WITH BMI OF 40.0-44.9, ADULT (HCC): ICD-10-CM

## 2021-09-10 PROBLEM — R90.89 ABNORMAL BRAIN MRI: Status: RESOLVED | Noted: 2020-07-17 | Resolved: 2021-09-10

## 2021-09-10 PROBLEM — R06.83 SNORING: Status: RESOLVED | Noted: 2020-07-17 | Resolved: 2021-09-10

## 2021-09-10 PROBLEM — G44.52 NEW DAILY PERSISTENT HEADACHE: Status: RESOLVED | Noted: 2020-07-17 | Resolved: 2021-09-10

## 2021-09-10 PROBLEM — R55 NEAR SYNCOPE: Status: RESOLVED | Noted: 2020-07-17 | Resolved: 2021-09-10

## 2021-09-10 PROCEDURE — 3008F BODY MASS INDEX DOCD: CPT | Performed by: PHYSICIAN ASSISTANT

## 2021-09-10 PROCEDURE — 99214 OFFICE O/P EST MOD 30 MIN: CPT | Performed by: PHYSICIAN ASSISTANT

## 2021-09-10 PROCEDURE — 3725F SCREEN DEPRESSION PERFORMED: CPT | Performed by: PHYSICIAN ASSISTANT

## 2021-09-10 PROCEDURE — 3079F DIAST BP 80-89 MM HG: CPT | Performed by: PHYSICIAN ASSISTANT

## 2021-09-10 PROCEDURE — 3074F SYST BP LT 130 MM HG: CPT | Performed by: PHYSICIAN ASSISTANT

## 2021-09-10 RX ORDER — MOMETASONE FUROATE 1 MG/G
CREAM TOPICAL DAILY
Qty: 45 G | Refills: 0 | Status: SHIPPED | OUTPATIENT
Start: 2021-09-10 | End: 2021-09-10 | Stop reason: SDUPTHER

## 2021-09-10 RX ORDER — MOMETASONE FUROATE 1 MG/G
CREAM TOPICAL DAILY
Qty: 45 G | Refills: 3 | Status: SHIPPED | OUTPATIENT
Start: 2021-09-10

## 2021-09-10 RX ORDER — LOSARTAN POTASSIUM 50 MG/1
50 TABLET ORAL DAILY
Qty: 90 TABLET | Refills: 3 | Status: SHIPPED | OUTPATIENT
Start: 2021-09-10

## 2021-09-10 NOTE — PROGRESS NOTES
Assessment/Plan:    1  Essential hypertension    - well controlled with diet, weight loss, losartan 50 mg daily, EKG today shows no change, labs reviewed  - losartan (Cozaar) 50 mg tablet; Take 1 tablet (50 mg total) by mouth daily  Dispense: 90 tablet; Refill: 3    2  Morbid obesity with BMI of 40 0-44 9, adult (HCC)    - down 30 lbs, keep up the good work! Goal of 240     3  Irritant contact dermatitis due to solvent    - c/w elocon cream as needed due to work exposure  - mometasone (ELOCON) 0 1 % cream; Apply topically daily  Dispense: 45 g; Refill: 3    4  NASIM (obstructive sleep apnea)    - not using CPAP, encouraged to use    5  Cerebellar tonsillar ectopia (HCC)/ Cervical myelopathy (HCC)/ Edema of spinal cord (HCC)    - under care neuro and neuro surg, has been advised nothing to do at this time    6  B12 deficiency    - c/w daily vitamin B12 from neuro    F/u 6 months or sooner if needed      Subjective:   Chief Complaint   Patient presents with    Blood Pressure Check      Patient ID: Tha Hong is a 54 y o  male  Patient here for follow up  Has lost over 30 lbs with diet and regular activity at work and walking dog  Feels good, goal is 240  Compliant with Losartan, no complaints, does nto check BP at home  Stopped seeing neuro, "never really helped" has not passed out in a long time  Finds when he gets sick and he coughs he will pass out, this is the only time  Has not gotten since since spring  Still taking Vit B12, not sure if he feels this does anything  Also taking asa 81 mg daily         The following portions of the patient's history were reviewed and updated as appropriate: allergies, current medications, past family history, past medical history, past social history, past surgical history and problem list     Past Medical History:   Diagnosis Date    Head ache     Hypertension      Past Surgical History:   Procedure Laterality Date    CARPAL TUNNEL RELEASE Left      Family History   Problem Relation Age of Onset    No Known Problems Mother     No Known Problems Father     Cancer Maternal Grandmother     Cancer Maternal Grandfather     Substance Abuse Brother     Substance Abuse Brother     Alcohol abuse Neg Hx     Mental illness Neg Hx      Social History     Socioeconomic History    Marital status: Single     Spouse name: Not on file    Number of children: Not on file    Years of education: Not on file    Highest education level: Not on file   Occupational History    Not on file   Tobacco Use    Smoking status: Current Every Day Smoker     Packs/day: 0 50     Types: Cigarettes    Smokeless tobacco: Never Used   Vaping Use    Vaping Use: Never used   Substance and Sexual Activity    Alcohol use: Yes     Comment: Everyday     Drug use: No    Sexual activity: Yes     Partners: Female   Other Topics Concern    Not on file   Social History Narrative    Not on file     Social Determinants of Health     Financial Resource Strain:     Difficulty of Paying Living Expenses:    Food Insecurity:     Worried About Running Out of Food in the Last Year:     Ran Out of Food in the Last Year:    Transportation Needs:     Lack of Transportation (Medical):      Lack of Transportation (Non-Medical):    Physical Activity:     Days of Exercise per Week:     Minutes of Exercise per Session:    Stress:     Feeling of Stress :    Social Connections:     Frequency of Communication with Friends and Family:     Frequency of Social Gatherings with Friends and Family:     Attends Judaism Services:     Active Member of Clubs or Organizations:     Attends Club or Organization Meetings:     Marital Status:    Intimate Partner Violence:     Fear of Current or Ex-Partner:     Emotionally Abused:     Physically Abused:     Sexually Abused:        Current Outpatient Medications:     aspirin 81 mg chewable tablet, Chew 81 mg daily, Disp: , Rfl:     cyanocobalamin 1,000 mcg/mL, INJECT ONCE WEEKLY FOR 3 WEEKS, THEN ONCE MONTHLY FOR 5 MONTHS, Disp: 3 mL, Rfl: 2    Cyanocobalamin 1000 MCG SUBL, Place 1 tablet (1,000 mcg total) under the tongue daily, Disp: 90 tablet, Rfl: 0    gabapentin (NEURONTIN) 300 mg capsule, By mouth take 1 capsule 3 times daily or as directed, Disp: 90 capsule, Rfl: 2    ketorolac (TORADOL) 10 mg tablet, Take 1 tablet (10 mg total) by mouth every 6 (six) hours as needed for severe pain, Disp: 20 tablet, Rfl: 0    losartan (Cozaar) 50 mg tablet, Take 1 tablet (50 mg total) by mouth daily, Disp: 90 tablet, Rfl: 0    SYRINGE-NEEDLE, DISP, 3 ML 25G X 1" 3 ML MISC, Inject into a muscle every 28 days, Disp: 8 each, Rfl: 0    Current Facility-Administered Medications:     cyanocobalamin injection 1,000 mcg, 1,000 mcg, Intramuscular, Q30 Days, Whit Cartagena MD, 1,000 mcg at 09/14/20 0841    Review of Systems   Constitutional: Negative  Eyes: Negative  Respiratory: Negative  Cardiovascular: Negative  Neurological: Negative  Objective:    Vitals:    09/10/21 0754   BP: 144/90   BP Location: Left arm   Patient Position: Sitting   Cuff Size: Adult   Pulse: 84   Resp: 16   Temp: 98 4 °F (36 9 °C)   TempSrc: Oral   SpO2: 99%   Weight: 120 kg (264 lb 12 8 oz)   Height: 5' 8" (1 727 m)        Physical Exam  Constitutional:       Appearance: Normal appearance  He is well-developed  He is obese  HENT:      Head: Normocephalic and atraumatic  Neck:      Vascular: No carotid bruit  Cardiovascular:      Rate and Rhythm: Normal rate and regular rhythm  Pulses: Normal pulses  Heart sounds: Normal heart sounds  Pulmonary:      Effort: Pulmonary effort is normal       Breath sounds: Normal breath sounds  Musculoskeletal:      Cervical back: Normal range of motion and neck supple  Right lower leg: No edema  Left lower leg: No edema  Skin:     General: Skin is warm  Neurological:      General: No focal deficit present  Mental Status: He is alert and oriented to person, place, and time  Psychiatric:         Mood and Affect: Mood normal          Behavior: Behavior normal          Thought Content: Thought content normal          Judgment: Judgment normal            BMI Counseling: Body mass index is 40 26 kg/m²  The BMI is above normal  Nutrition recommendations include reducing portion sizes

## 2022-01-17 ENCOUNTER — CLINICAL SUPPORT (OUTPATIENT)
Dept: FAMILY MEDICINE CLINIC | Facility: CLINIC | Age: 52
End: 2022-01-17

## 2022-01-17 DIAGNOSIS — Z20.822 SUSPECTED COVID-19 VIRUS INFECTION: Primary | ICD-10-CM

## 2022-01-17 PROCEDURE — U0003 INFECTIOUS AGENT DETECTION BY NUCLEIC ACID (DNA OR RNA); SEVERE ACUTE RESPIRATORY SYNDROME CORONAVIRUS 2 (SARS-COV-2) (CORONAVIRUS DISEASE [COVID-19]), AMPLIFIED PROBE TECHNIQUE, MAKING USE OF HIGH THROUGHPUT TECHNOLOGIES AS DESCRIBED BY CMS-2020-01-R: HCPCS | Performed by: PHYSICIAN ASSISTANT

## 2022-01-17 PROCEDURE — U0005 INFEC AGEN DETEC AMPLI PROBE: HCPCS | Performed by: PHYSICIAN ASSISTANT

## 2022-01-18 ENCOUNTER — OFFICE VISIT (OUTPATIENT)
Dept: FAMILY MEDICINE CLINIC | Facility: CLINIC | Age: 52
End: 2022-01-18
Payer: COMMERCIAL

## 2022-01-18 ENCOUNTER — TELEPHONE (OUTPATIENT)
Dept: FAMILY MEDICINE CLINIC | Facility: CLINIC | Age: 52
End: 2022-01-18

## 2022-01-18 ENCOUNTER — APPOINTMENT (OUTPATIENT)
Dept: RADIOLOGY | Facility: CLINIC | Age: 52
End: 2022-01-18
Payer: COMMERCIAL

## 2022-01-18 VITALS
HEART RATE: 92 BPM | BODY MASS INDEX: 40.14 KG/M2 | SYSTOLIC BLOOD PRESSURE: 126 MMHG | WEIGHT: 271 LBS | RESPIRATION RATE: 16 BRPM | HEIGHT: 69 IN | DIASTOLIC BLOOD PRESSURE: 80 MMHG

## 2022-01-18 DIAGNOSIS — R09.1 PLEURISY: Primary | ICD-10-CM

## 2022-01-18 DIAGNOSIS — R09.1 PLEURISY: ICD-10-CM

## 2022-01-18 LAB — SARS-COV-2 RNA RESP QL NAA+PROBE: NEGATIVE

## 2022-01-18 PROCEDURE — 3008F BODY MASS INDEX DOCD: CPT | Performed by: PHYSICIAN ASSISTANT

## 2022-01-18 PROCEDURE — 71046 X-RAY EXAM CHEST 2 VIEWS: CPT

## 2022-01-18 PROCEDURE — 99214 OFFICE O/P EST MOD 30 MIN: CPT | Performed by: PHYSICIAN ASSISTANT

## 2022-01-18 NOTE — TELEPHONE ENCOUNTER
Patient was tested yesterday for covid and it came back NEGATIVE  However, he feels like his chest is really tight, hurts to breathe and it feels like it's on fire  He sounded slightly short of breath when I was speaking to him on the phone  He had experienced other systems last week   aches and pains, nausea but they have resolved  Should I bring him in for an apptment?

## 2022-01-18 NOTE — LETTER
January 18, 2022     Patient: Ramirez Altamirano   YOB: 1970   Date of Visit: 1/18/2022       To Whom it May Concern:    Shanika Prosper is under my professional care  He was seen in my office on 1/18/2022  He may return to work on 1/21/2022  If you have any questions or concerns, please don't hesitate to call           Sincerely,          Reginaldo Durant PA-C        CC: No Recipients

## 2022-01-18 NOTE — PROGRESS NOTES
Assessment/Plan:    1  Pleurisy    - most likely musculoskeletal from vomiting as chest pain can be reproduced by palpation, will do CXR to be thorough and in a smoker, reassurance, fluids and rest  EKG NSR  - XR chest pa & lateral; Future    F/u as needed    Subjective:   Chief Complaint   Patient presents with    Chest tightiness    Shortness of Breath      Patient ID: Kelsey Rojo is a 54 y o  male  Friday started to feel sick at work, had a rapid test at work and it was negative  Continued to feel "off" like he was starting to get sick then Sunday vomited all day, so much that he was dry heaving by end of day  Then Monday began with chest pain with taking a deep breath  Tested here and was negative  Denies dsypnea on exertion, leg swelling  Fever, chills, cough productive sputum  Hurts to take a deep breath, gets chest pain with every deep breath         The following portions of the patient's history were reviewed and updated as appropriate: allergies, current medications, past family history, past medical history, past social history, past surgical history and problem list     Past Medical History:   Diagnosis Date    Head ache     Hypertension      Past Surgical History:   Procedure Laterality Date    CARPAL TUNNEL RELEASE Left      Family History   Problem Relation Age of Onset    No Known Problems Mother     No Known Problems Father     Cancer Maternal Grandmother     Cancer Maternal Grandfather     Substance Abuse Brother     Substance Abuse Brother     Alcohol abuse Neg Hx     Mental illness Neg Hx      Social History     Socioeconomic History    Marital status: Single     Spouse name: Not on file    Number of children: Not on file    Years of education: Not on file    Highest education level: Not on file   Occupational History    Not on file   Tobacco Use    Smoking status: Current Every Day Smoker     Packs/day: 0 50     Types: Cigarettes    Smokeless tobacco: Never Used Vaping Use    Vaping Use: Never used   Substance and Sexual Activity    Alcohol use: Yes     Comment: Everyday     Drug use: No    Sexual activity: Yes     Partners: Female   Other Topics Concern    Not on file   Social History Narrative    Not on file     Social Determinants of Health     Financial Resource Strain: Not on file   Food Insecurity: Not on file   Transportation Needs: Not on file   Physical Activity: Not on file   Stress: Not on file   Social Connections: Not on file   Intimate Partner Violence: Not on file   Housing Stability: Not on file       Current Outpatient Medications:     aspirin 81 mg chewable tablet, Chew 81 mg daily, Disp: , Rfl:     cyanocobalamin 1,000 mcg/mL, INJECT ONCE WEEKLY FOR 3 WEEKS, THEN ONCE MONTHLY FOR 5 MONTHS, Disp: 3 mL, Rfl: 2    Cyanocobalamin 1000 MCG SUBL, Place 1 tablet (1,000 mcg total) under the tongue daily, Disp: 90 tablet, Rfl: 0    ketorolac (TORADOL) 10 mg tablet, Take 1 tablet (10 mg total) by mouth every 6 (six) hours as needed for severe pain, Disp: 20 tablet, Rfl: 0    losartan (Cozaar) 50 mg tablet, Take 1 tablet (50 mg total) by mouth daily, Disp: 90 tablet, Rfl: 3    mometasone (ELOCON) 0 1 % cream, Apply topically daily, Disp: 45 g, Rfl: 3    SYRINGE-NEEDLE, DISP, 3 ML 25G X 1" 3 ML MISC, Inject into a muscle every 28 days, Disp: 8 each, Rfl: 0    Current Facility-Administered Medications:     cyanocobalamin injection 1,000 mcg, 1,000 mcg, Intramuscular, Q30 Days, Susan Mo MD, 1,000 mcg at 09/14/20 0841    Review of Systems          Objective:    Vitals:    01/18/22 1459   BP: 126/80   BP Location: Left arm   Patient Position: Sitting   Cuff Size: Large   Pulse: 92   Resp: 16   Weight: 123 kg (271 lb)   Height: 5' 8 75" (1 746 m)        Physical Exam  Constitutional:       Appearance: He is well-developed  HENT:      Head: Normocephalic and atraumatic  Cardiovascular:      Rate and Rhythm: Normal rate and regular rhythm  Pulses: Normal pulses  Heart sounds: Normal heart sounds  Pulmonary:      Effort: Pulmonary effort is normal       Breath sounds: Normal breath sounds  Chest:      Chest wall: Tenderness present  Comments: B/l sternal border tender to palpation  Musculoskeletal:      Cervical back: Normal range of motion and neck supple  Right lower leg: No tenderness  No edema  Left lower leg: No tenderness  No edema  Skin:     General: Skin is warm  Neurological:      General: No focal deficit present  Mental Status: He is alert and oriented to person, place, and time     Psychiatric:         Mood and Affect: Mood normal          Behavior: Behavior normal

## 2022-04-26 ENCOUNTER — APPOINTMENT (OUTPATIENT)
Dept: RADIOLOGY | Facility: CLINIC | Age: 52
End: 2022-04-26
Payer: COMMERCIAL

## 2022-04-26 ENCOUNTER — OFFICE VISIT (OUTPATIENT)
Dept: FAMILY MEDICINE CLINIC | Facility: CLINIC | Age: 52
End: 2022-04-26
Payer: COMMERCIAL

## 2022-04-26 VITALS
WEIGHT: 277 LBS | HEART RATE: 78 BPM | DIASTOLIC BLOOD PRESSURE: 84 MMHG | RESPIRATION RATE: 16 BRPM | BODY MASS INDEX: 41.98 KG/M2 | HEIGHT: 68 IN | SYSTOLIC BLOOD PRESSURE: 136 MMHG

## 2022-04-26 DIAGNOSIS — M79.672 LEFT FOOT PAIN: ICD-10-CM

## 2022-04-26 DIAGNOSIS — E66.01 MORBID OBESITY WITH BMI OF 40.0-44.9, ADULT (HCC): Primary | ICD-10-CM

## 2022-04-26 DIAGNOSIS — Z12.11 SCREENING FOR COLON CANCER: ICD-10-CM

## 2022-04-26 PROCEDURE — 3725F SCREEN DEPRESSION PERFORMED: CPT | Performed by: PHYSICIAN ASSISTANT

## 2022-04-26 PROCEDURE — 73630 X-RAY EXAM OF FOOT: CPT

## 2022-04-26 PROCEDURE — 3008F BODY MASS INDEX DOCD: CPT | Performed by: PHYSICIAN ASSISTANT

## 2022-04-26 PROCEDURE — 99213 OFFICE O/P EST LOW 20 MIN: CPT | Performed by: PHYSICIAN ASSISTANT

## 2022-04-26 NOTE — PROGRESS NOTES
Assessment/Plan:    1  Left foot pain    - for 3 months at this point, will do XR and refer to podiatry, possible injection then to follow with PT  - XR foot 3+ vw left; Future  - Ambulatory Referral to Podiatry; Future    2  Morbid obesity    - encouraged patient to work on [employer], exercise  and adequate sleep for weight loss  Follow up if more help is needed    F/u as needed    Subjective:   Chief Complaint   Patient presents with    Left foot pain      Patient ID: Teja Chanel is a 54 y o  male  Patient with left foot pain for sometime  Tried switching shoes/boots, tried inserts without relief  Does not get better as day goes  With first steps burning fire sensation  Limping for months  Tried aleve with no relief  Stays just in heel  Pain for 3 months  No pain at rest but not weight bearing and foot flexion pain returns  Deneis swelling, bruise  Tender to touch        The following portions of the patient's history were reviewed and updated as appropriate: allergies, current medications, past family history, past medical history, past social history, past surgical history and problem list     Past Medical History:   Diagnosis Date    Head ache     Hypertension      Past Surgical History:   Procedure Laterality Date    CARPAL TUNNEL RELEASE Left      Family History   Problem Relation Age of Onset    No Known Problems Mother     No Known Problems Father     Cancer Maternal Grandmother     Cancer Maternal Grandfather     Substance Abuse Brother     Substance Abuse Brother     Alcohol abuse Neg Hx     Mental illness Neg Hx      Social History     Socioeconomic History    Marital status: Single     Spouse name: Not on file    Number of children: Not on file    Years of education: Not on file    Highest education level: Not on file   Occupational History    Not on file   Tobacco Use    Smoking status: Current Every Day Smoker     Packs/day: 0 50     Types: Cigarettes    Smokeless tobacco: Never Used   Vaping Use    Vaping Use: Never used   Substance and Sexual Activity    Alcohol use: Yes     Comment: Everyday     Drug use: No    Sexual activity: Yes     Partners: Female   Other Topics Concern    Not on file   Social History Narrative    Not on file     Social Determinants of Health     Financial Resource Strain: Not on file   Food Insecurity: Not on file   Transportation Needs: Not on file   Physical Activity: Not on file   Stress: Not on file   Social Connections: Not on file   Intimate Partner Violence: Not on file   Housing Stability: Not on file       Current Outpatient Medications:     aspirin 81 mg chewable tablet, Chew 81 mg daily, Disp: , Rfl:     cyanocobalamin 1,000 mcg/mL, INJECT ONCE WEEKLY FOR 3 WEEKS, THEN ONCE MONTHLY FOR 5 MONTHS, Disp: 3 mL, Rfl: 2    Cyanocobalamin 1000 MCG SUBL, Place 1 tablet (1,000 mcg total) under the tongue daily, Disp: 90 tablet, Rfl: 0    ketorolac (TORADOL) 10 mg tablet, Take 1 tablet (10 mg total) by mouth every 6 (six) hours as needed for severe pain, Disp: 20 tablet, Rfl: 0    losartan (Cozaar) 50 mg tablet, Take 1 tablet (50 mg total) by mouth daily, Disp: 90 tablet, Rfl: 3    mometasone (ELOCON) 0 1 % cream, Apply topically daily, Disp: 45 g, Rfl: 3    SYRINGE-NEEDLE, DISP, 3 ML 25G X 1" 3 ML MISC, Inject into a muscle every 28 days, Disp: 8 each, Rfl: 0    Current Facility-Administered Medications:     cyanocobalamin injection 1,000 mcg, 1,000 mcg, Intramuscular, Q30 Days, Dexter Medina MD, 1,000 mcg at 09/14/20 0841    Review of Systems          Objective:    Vitals:    04/26/22 1347   BP: 136/84   BP Location: Left arm   Patient Position: Sitting   Cuff Size: Large   Pulse: 78   Resp: 16   Weight: 126 kg (277 lb)   Height: 5' 8 25" (1 734 m)        Physical Exam  Constitutional:       Appearance: Normal appearance  He is normal weight  HENT:      Head: Normocephalic and atraumatic     Cardiovascular:      Rate and Rhythm: Normal rate and regular rhythm  Pulses: Normal pulses  Heart sounds: Normal heart sounds  Pulmonary:      Effort: Pulmonary effort is normal       Breath sounds: Normal breath sounds  Musculoskeletal:      Cervical back: Normal range of motion and neck supple  Comments: Walking with limp on left, not bearing weight on left    Pain perceived left calcaneus bone laterally and medially over arch, no bony abnormality, full ROM   Neurological:      General: No focal deficit present  Mental Status: He is alert and oriented to person, place, and time  Psychiatric:         Mood and Affect: Mood normal          Behavior: Behavior normal          Thought Content: Thought content normal          Judgment: Judgment normal                BMI Counseling: Body mass index is 41 81 kg/m²  The BMI is above normal  Nutrition recommendations include reducing portion sizes

## 2022-05-02 ENCOUNTER — TELEPHONE (OUTPATIENT)
Dept: FAMILY MEDICINE CLINIC | Facility: CLINIC | Age: 52
End: 2022-05-02

## 2022-05-02 NOTE — TELEPHONE ENCOUNTER
----- Message from Wilberto Bethea PA-C sent at 5/2/2022  1:18 PM EDT -----  Please let patient know his XR foot was normal, he should see podiatry

## 2022-08-23 ENCOUNTER — OFFICE VISIT (OUTPATIENT)
Dept: FAMILY MEDICINE CLINIC | Facility: CLINIC | Age: 52
End: 2022-08-23
Payer: COMMERCIAL

## 2022-08-23 VITALS
HEIGHT: 68 IN | HEART RATE: 81 BPM | BODY MASS INDEX: 40.74 KG/M2 | DIASTOLIC BLOOD PRESSURE: 70 MMHG | RESPIRATION RATE: 16 BRPM | SYSTOLIC BLOOD PRESSURE: 114 MMHG | WEIGHT: 268.8 LBS

## 2022-08-23 DIAGNOSIS — R53.83 FATIGUE, UNSPECIFIED TYPE: ICD-10-CM

## 2022-08-23 DIAGNOSIS — Z13.89 SCREENING FOR BLOOD OR PROTEIN IN URINE: ICD-10-CM

## 2022-08-23 DIAGNOSIS — Z13.220 LIPID SCREENING: ICD-10-CM

## 2022-08-23 DIAGNOSIS — Z12.11 SCREENING FOR COLON CANCER: ICD-10-CM

## 2022-08-23 DIAGNOSIS — I10 ESSENTIAL HYPERTENSION: ICD-10-CM

## 2022-08-23 DIAGNOSIS — Z12.5 PROSTATE CANCER SCREENING: ICD-10-CM

## 2022-08-23 DIAGNOSIS — Z23 NEED FOR TDAP VACCINATION: ICD-10-CM

## 2022-08-23 DIAGNOSIS — Z00.00 ANNUAL PHYSICAL EXAM: Primary | ICD-10-CM

## 2022-08-23 PROCEDURE — 90471 IMMUNIZATION ADMIN: CPT

## 2022-08-23 PROCEDURE — 3078F DIAST BP <80 MM HG: CPT | Performed by: PHYSICIAN ASSISTANT

## 2022-08-23 PROCEDURE — 3074F SYST BP LT 130 MM HG: CPT | Performed by: PHYSICIAN ASSISTANT

## 2022-08-23 PROCEDURE — 99396 PREV VISIT EST AGE 40-64: CPT | Performed by: PHYSICIAN ASSISTANT

## 2022-08-23 PROCEDURE — 90715 TDAP VACCINE 7 YRS/> IM: CPT

## 2022-08-23 RX ORDER — LOSARTAN POTASSIUM 50 MG/1
50 TABLET ORAL DAILY
Qty: 90 TABLET | Refills: 3 | Status: SHIPPED | OUTPATIENT
Start: 2022-08-23

## 2022-08-23 RX ORDER — DIPHENOXYLATE HYDROCHLORIDE AND ATROPINE SULFATE 2.5; .025 MG/1; MG/1
1 TABLET ORAL DAILY
COMMUNITY

## 2022-08-23 NOTE — PROGRESS NOTES
ADULT ANNUAL PHYSICAL  [facility]    NAME: Meghann Gil  AGE: 50 y o  SEX: male  : 1970     DATE: 2022     Assessment and Plan:     Healthy 50year old male    Immunizations and preventive care screenings were discussed with patient today  Appropriate education was printed on patient's after visit summary  Counseling:  Alcohol/drug use: discussed moderation in alcohol intake, the recommendations for healthy alcohol use, and avoidance of illicit drug use  Dental Health: discussed importance of regular tooth brushing, flossing, and dental visits  Injury prevention: discussed safety/seat belts, safety helmets, smoke detectors, carbon dioxide detectors, and smoking near bedding or upholstery  · Exercise: the importance of regular exercise/physical activity was discussed  Recommend exercise 3-5 times per week for at least 30 minutes  · Cologard ordered  · Labs ordered  · Tdap today         Return in 1 year (on 2023)  Chief Complaint:     Chief Complaint   Patient presents with    Physical Exam      History of Present Illness:     Adult Annual Physical   Patient here for a comprehensive physical exam  The patient reports no problems  Diet and Physical Activity  · Diet/Nutrition: well balanced diet  · Exercise: no formal exercise  Depression Screening  PHQ-2/9 Depression Screening         General Health  · Sleep: sleeps well and gets 4-6 hours of sleep on average  · Hearing: normal - bilateral   · Vision: wears glasses and decrease vision  · Dental: regular dental visits and brushes teeth twice daily   Health  · Symptoms include: none     Review of Systems:     Review of Systems   Constitutional: Negative  HENT: Negative  Eyes: Negative  Respiratory: Negative  Cardiovascular: Negative  Gastrointestinal: Negative  Endocrine: Negative  Genitourinary: Negative  Musculoskeletal: Negative  Skin: Negative  Allergic/Immunologic: Negative  Neurological: Negative  Hematological: Negative  Psychiatric/Behavioral: Negative         Past Medical History:     Past Medical History:   Diagnosis Date    Head ache     Hypertension       Past Surgical History:     Past Surgical History:   Procedure Laterality Date    CARPAL TUNNEL RELEASE Left       Family History:     Family History   Problem Relation Age of Onset    No Known Problems Mother     No Known Problems Father     Cancer Maternal Grandmother     Cancer Maternal Grandfather     Substance Abuse Brother     Substance Abuse Brother     Alcohol abuse Neg Hx     Mental illness Neg Hx       Social History:     Social History     Socioeconomic History    Marital status: Single     Spouse name: None    Number of children: None    Years of education: None    Highest education level: None   Occupational History    None   Tobacco Use    Smoking status: Current Every Day Smoker     Packs/day: 0 50     Types: Cigarettes    Smokeless tobacco: Never Used   Vaping Use    Vaping Use: Never used   Substance and Sexual Activity    Alcohol use: Yes     Comment: Everyday     Drug use: No    Sexual activity: Yes     Partners: Female   Other Topics Concern    None   Social History Narrative    None     Social Determinants of Health     Financial Resource Strain: Not on file   Food Insecurity: Not on file   Transportation Needs: Not on file   Physical Activity: Not on file   Stress: Not on file   Social Connections: Not on file   Intimate Partner Violence: Not on file   Housing Stability: Not on file      Current Medications:     Current Outpatient Medications   Medication Sig Dispense Refill    aspirin 81 mg chewable tablet Chew 81 mg daily      Cyanocobalamin 1000 MCG SUBL Place 1 tablet (1,000 mcg total) under the tongue daily 90 tablet 0    losartan (Cozaar) 50 mg tablet Take 1 tablet (50 mg total) by mouth daily 90 tablet 3    mometasone (ELOCON) 0 1 % cream Apply topically daily 45 g 3    multivitamin (THERAGRAN) TABS Take 1 tablet by mouth daily       Current Facility-Administered Medications   Medication Dose Route Frequency Provider Last Rate Last Admin    cyanocobalamin injection 1,000 mcg  1,000 mcg Intramuscular Q30 Days Shabnam Brooks MD   1,000 mcg at 09/14/20 0841      Allergies: Allergies   Allergen Reactions    Lisinopril Cough      Physical Exam:     /70   Pulse 81   Resp 16   Ht 5' 8" (1 727 m)   Wt 122 kg (268 lb 12 8 oz)   BMI 40 87 kg/m²     Physical Exam  Constitutional:       Appearance: Normal appearance  He is well-developed  HENT:      Head: Normocephalic and atraumatic  Eyes:      Extraocular Movements: Extraocular movements intact  Conjunctiva/sclera: Conjunctivae normal       Pupils: Pupils are equal, round, and reactive to light  Neck:      Thyroid: No thyromegaly  Cardiovascular:      Rate and Rhythm: Normal rate and regular rhythm  Pulses: Normal pulses  Heart sounds: Normal heart sounds  No murmur heard  Pulmonary:      Effort: Pulmonary effort is normal       Breath sounds: Normal breath sounds  No wheezing or rales  Abdominal:      General: Abdomen is flat  Bowel sounds are normal       Palpations: Abdomen is soft  There is no mass  Tenderness: There is no abdominal tenderness  There is no rebound  Musculoskeletal:         General: Normal range of motion  Cervical back: Normal range of motion and neck supple  Lymphadenopathy:      Cervical: No cervical adenopathy  Skin:     General: Skin is warm  Neurological:      General: No focal deficit present  Mental Status: He is alert and oriented to person, place, and time  Cranial Nerves: No cranial nerve deficit        Deep Tendon Reflexes: Reflexes normal    Psychiatric:         Mood and Affect: Mood normal          Behavior: Behavior normal          Thought Content:  Thought content normal          Judgment: Judgment normal           MARILIA Troy

## 2022-08-23 NOTE — PATIENT INSTRUCTIONS

## 2022-08-28 LAB
ALBUMIN SERPL-MCNC: 4.5 G/DL (ref 4–5)
ALBUMIN/GLOB SERPL: 2.4 {RATIO} (ref 1.2–2.2)
ALP SERPL-CCNC: 80 IU/L (ref 44–121)
ALT SERPL-CCNC: 56 IU/L (ref 0–44)
APPEARANCE UR: CLEAR
AST SERPL-CCNC: 29 IU/L (ref 0–40)
BASOPHILS # BLD AUTO: 0.1 X10E3/UL (ref 0–0.2)
BASOPHILS NFR BLD AUTO: 1 %
BILIRUB SERPL-MCNC: 0.4 MG/DL (ref 0–1.2)
BILIRUB UR QL STRIP: NEGATIVE
BUN SERPL-MCNC: 12 MG/DL (ref 6–24)
BUN/CREAT SERPL: 12 (ref 9–20)
CALCIUM SERPL-MCNC: 9.4 MG/DL (ref 8.7–10.2)
CHLORIDE SERPL-SCNC: 101 MMOL/L (ref 96–106)
CHOLEST SERPL-MCNC: 161 MG/DL (ref 100–199)
CO2 SERPL-SCNC: 25 MMOL/L (ref 20–29)
COLOR UR: YELLOW
CREAT SERPL-MCNC: 0.98 MG/DL (ref 0.76–1.27)
EGFR: 94 ML/MIN/1.73
EOSINOPHIL # BLD AUTO: 0.2 X10E3/UL (ref 0–0.4)
EOSINOPHIL NFR BLD AUTO: 5 %
ERYTHROCYTE [DISTWIDTH] IN BLOOD BY AUTOMATED COUNT: 12.3 % (ref 11.6–15.4)
GLOBULIN SER-MCNC: 1.9 G/DL (ref 1.5–4.5)
GLUCOSE SERPL-MCNC: 104 MG/DL (ref 65–99)
GLUCOSE UR QL: NEGATIVE
HCT VFR BLD AUTO: 49.3 % (ref 37.5–51)
HDLC SERPL-MCNC: 34 MG/DL
HGB BLD-MCNC: 16.2 G/DL (ref 13–17.7)
HGB UR QL STRIP: NEGATIVE
IMM GRANULOCYTES # BLD: 0 X10E3/UL (ref 0–0.1)
IMM GRANULOCYTES NFR BLD: 0 %
KETONES UR QL STRIP: NEGATIVE
LDLC SERPL CALC-MCNC: 105 MG/DL (ref 0–99)
LEUKOCYTE ESTERASE UR QL STRIP: NEGATIVE
LYMPHOCYTES # BLD AUTO: 0.9 X10E3/UL (ref 0.7–3.1)
LYMPHOCYTES NFR BLD AUTO: 21 %
MCH RBC QN AUTO: 30.6 PG (ref 26.6–33)
MCHC RBC AUTO-ENTMCNC: 32.9 G/DL (ref 31.5–35.7)
MCV RBC AUTO: 93 FL (ref 79–97)
MICRO URNS: ABNORMAL
MONOCYTES # BLD AUTO: 0.7 X10E3/UL (ref 0.1–0.9)
MONOCYTES NFR BLD AUTO: 17 %
NEUTROPHILS # BLD AUTO: 2.4 X10E3/UL (ref 1.4–7)
NEUTROPHILS NFR BLD AUTO: 56 %
NITRITE UR QL STRIP: NEGATIVE
PH UR STRIP: 6.5 [PH] (ref 5–7.5)
PLATELET # BLD AUTO: 183 X10E3/UL (ref 150–450)
POTASSIUM SERPL-SCNC: 4.5 MMOL/L (ref 3.5–5.2)
PROT SERPL-MCNC: 6.4 G/DL (ref 6–8.5)
PROT UR QL STRIP: NEGATIVE
PSA SERPL-MCNC: 1 NG/ML (ref 0–4)
RBC # BLD AUTO: 5.3 X10E6/UL (ref 4.14–5.8)
SODIUM SERPL-SCNC: 139 MMOL/L (ref 134–144)
SP GR UR: <=1.005 (ref 1–1.03)
TESTOST SERPL-MCNC: 442 NG/DL (ref 264–916)
TRIGL SERPL-MCNC: 122 MG/DL (ref 0–149)
TSH SERPL DL<=0.005 MIU/L-ACNC: 1.92 UIU/ML (ref 0.45–4.5)
UROBILINOGEN UR STRIP-ACNC: 0.2 MG/DL (ref 0.2–1)
WBC # BLD AUTO: 4.3 X10E3/UL (ref 3.4–10.8)

## 2022-09-06 ENCOUNTER — TELEPHONE (OUTPATIENT)
Dept: FAMILY MEDICINE CLINIC | Facility: CLINIC | Age: 52
End: 2022-09-06

## 2022-09-06 DIAGNOSIS — R79.89 ELEVATED LFTS: Primary | ICD-10-CM

## 2022-09-06 NOTE — TELEPHONE ENCOUNTER
----- Message from Freda Almaraz PA-C sent at 9/6/2022 12:14 PM EDT -----  Please let patient know his labs were all within normal range except his sugar was a little high  I want him to watch his diet (carbs, sweets, soda, juice) a littler closer  Also one liver function test was elevated, I would like to repeat that test in a month and also get an US of his liver   OK?

## 2022-09-29 ENCOUNTER — TELEMEDICINE (OUTPATIENT)
Dept: FAMILY MEDICINE CLINIC | Facility: CLINIC | Age: 52
End: 2022-09-29
Payer: COMMERCIAL

## 2022-09-29 VITALS — TEMPERATURE: 99 F

## 2022-09-29 DIAGNOSIS — U07.1 COVID-19: Primary | ICD-10-CM

## 2022-09-29 PROCEDURE — 99213 OFFICE O/P EST LOW 20 MIN: CPT | Performed by: PHYSICIAN ASSISTANT

## 2022-09-29 RX ORDER — ALBUTEROL SULFATE 90 UG/1
2 AEROSOL, METERED RESPIRATORY (INHALATION) EVERY 6 HOURS PRN
Qty: 18 G | Refills: 0 | Status: SHIPPED | OUTPATIENT
Start: 2022-09-29

## 2022-09-29 NOTE — PROGRESS NOTES
COVID-19 Outpatient Progress Note    Assessment/Plan:    1  COVID-19    - on day 2 in an unvaccinated individual, encouraged fluids, rest, called in Proventil for cough, seems to feel better today, will wait and see how he feels tomorrow, if no better will call in Paxlovid  Encouraged Mucinex, delsym at night, Vit D, C and Zinc    F/u as needed     Disposition:     Patient has asymptomatic or mild COVID-19 infection  Based off CDC guidelines, they were recommended to isolate for 5 days  If they are asymptomatic or symptoms are improving with no fevers in the past 24 hours, isolation may be ended followed by 5 days of wearing a mask when around othes to minimize risk of infecting others  If still have a fever or other symptoms have not improved, continue to isolate until they improve  Regardless of when they end isolation, avoid being around people who are more likely to get very sick from COVID-19 until at least day 11  Discussed symptom directed medication options with patient  Discussed vitamin D, vitamin C, and/or zinc supplementation with patient  I have spent 15 minutes directly with the patient  Greater than 50% of this time was spent in counseling/coordination of care regarding: diagnostic results, prognosis, risks and benefits of treatment options, instructions for management, patient and family education, importance of treatment compliance, risk factor reductions and impressions  Encounter provider: Amy Pineda PA-C     Provider located at: [facility] [address] [place] [address] [address] [place] [address]  [phone]     Recent Visits  No visits were found meeting these conditions    Showing recent visits within past 7 days and meeting all other requirements  Today's Visits  Date Type Provider Dept   09/29/22 Telemedicine Amy Pineda PA-C [facility] [address] today's visits and meeting all other requirements  Future Appointments  No visits were found meeting these conditions  Showing future appointments within next 150 days and meeting all other requirements     This virtual check-in was done via [employer] and patient was informed that this is a secure, HIPAA-compliant platform  He agrees to proceed  Patient agrees to participate in a virtual check in via telephone or video visit instead of presenting to the office to address urgent/immediate medical needs  Patient is aware this is a billable service  He acknowledged consent and understanding of privacy and security of the video platform  The patient has agreed to participate and understands they can discontinue the visit at any time  After connecting through [facility], the patient was identified by name and date of birth  Mario Valencia was informed that this was a telemedicine visit and that the exam was being conducted confidentially over secure lines  My office door was closed  No one else was in the room  Mario Valencia acknowledged consent and understanding of privacy and security of the telemedicine visit  I informed the patient that I have reviewed his record in Epic and presented the opportunity for him to ask any questions regarding the visit today  The patient agreed to participate  Verification of patient location:  Patient is located in the following state in which I hold an active license: [place]    Subjective:   Mario Valencia is a 50 y o  male who has been screened for COVID-19  Symptom change since last report: unchanged  Patient's symptoms include fever, chills, fatigue, malaise, nasal congestion, rhinorrhea, cough, nausea, myalgias and headache  Patient denies sore throat, anosmia, loss of taste, shortness of breath, chest tightness, abdominal pain, vomiting and diarrhea  - Date of symptom onset: 9/28/2022  - Date of positive COVID-19 test: 9/29/2022  Type of test: Home antigen       COVID-19 vaccination status: Not vaccinated    [place] has been staying home and has isolated themselves in his home  He is taking care to not share personal items and is cleaning all surfaces that are touched often, like counters, tabletops, and doorknobs using household cleaning sprays or wipes  He is wearing a mask when he leaves his room  Symptoms started yesterday, temp 100, cough with mucus  Taking advil and tylenol  Exhausted  Lab Results   Component Value Date    SARSCOV2 Negative 01/17/2022    SARSCOV2 Not Detected 12/07/2020       Review of Systems   Constitutional: Positive for chills, fatigue and fever  HENT: Positive for congestion and rhinorrhea  Negative for sore throat  Respiratory: Positive for cough  Negative for chest tightness and shortness of breath  Gastrointestinal: Positive for nausea  Negative for abdominal pain, diarrhea and vomiting  Musculoskeletal: Positive for myalgias  Neurological: Positive for headaches       Current Outpatient Medications on File Prior to Visit   Medication Sig    aspirin 81 mg chewable tablet Chew 81 mg daily    Cyanocobalamin 1000 MCG SUBL Place 1 tablet (1,000 mcg total) under the tongue daily    losartan (Cozaar) 50 mg tablet Take 1 tablet (50 mg total) by mouth daily    mometasone (ELOCON) 0 1 % cream Apply topically daily    multivitamin (THERAGRAN) TABS Take 1 tablet by mouth daily       Objective:    Temp 99 °F (37 2 °C)      Physical Exam  Karlo Rajan PA-C

## 2022-09-30 ENCOUNTER — TELEPHONE (OUTPATIENT)
Dept: FAMILY MEDICINE CLINIC | Facility: CLINIC | Age: 52
End: 2022-09-30

## 2022-09-30 DIAGNOSIS — U07.1 COVID-19: Primary | ICD-10-CM

## 2022-09-30 RX ORDER — NIRMATRELVIR AND RITONAVIR 300-100 MG
3 KIT ORAL 2 TIMES DAILY
Qty: 30 TABLET | Refills: 0 | Status: SHIPPED | OUTPATIENT
Start: 2022-09-30 | End: 2022-10-05

## 2022-09-30 NOTE — TELEPHONE ENCOUNTER
Patient is just about to start taking Paxlovid  He is wondering if he can also take Mucinex Sinus Medicine  I know we say it's okay to take Mucinex cough medicine, but I wasn't sure about Sinus medicine  I did suggest he call the pharmacist to ask, but he wanted me to ask you

## 2022-09-30 NOTE — TELEPHONE ENCOUNTER
Patient called this morning he is feeling worse today has cough chills headache body aches lots of phlem sinus pressure      Patient is asking paxlovid please send to [facility] [address]

## 2022-09-30 NOTE — TELEPHONE ENCOUNTER
He has hypertension, I would prefer he stick to plain mucinex, not mucinex sinus as that has a decongestant and make his BP go up  Thank you

## 2022-10-06 ENCOUNTER — TELEPHONE (OUTPATIENT)
Dept: FAMILY MEDICINE CLINIC | Facility: CLINIC | Age: 52
End: 2022-10-06

## 2022-10-06 NOTE — TELEPHONE ENCOUNTER
Pt called stating he recently had COVID and has a lingering cough  He states he is coughing up mucus but no SOB  Pt's coworker has a pulse ox and it was 93 today   Pt is just wondering if this is normal?    Call back number: [phone]

## 2022-10-07 ENCOUNTER — APPOINTMENT (OUTPATIENT)
Dept: RADIOLOGY | Facility: CLINIC | Age: 52
End: 2022-10-07
Payer: COMMERCIAL

## 2022-10-07 ENCOUNTER — OFFICE VISIT (OUTPATIENT)
Dept: FAMILY MEDICINE CLINIC | Facility: CLINIC | Age: 52
End: 2022-10-07
Payer: COMMERCIAL

## 2022-10-07 VITALS
BODY MASS INDEX: 41.37 KG/M2 | HEIGHT: 68 IN | TEMPERATURE: 98.6 F | SYSTOLIC BLOOD PRESSURE: 130 MMHG | WEIGHT: 273 LBS | OXYGEN SATURATION: 97 % | RESPIRATION RATE: 18 BRPM | DIASTOLIC BLOOD PRESSURE: 70 MMHG | HEART RATE: 90 BPM

## 2022-10-07 DIAGNOSIS — J40 BRONCHITIS: ICD-10-CM

## 2022-10-07 DIAGNOSIS — U07.1 COVID-19: Primary | ICD-10-CM

## 2022-10-07 DIAGNOSIS — U07.1 COVID-19: ICD-10-CM

## 2022-10-07 PROCEDURE — 99214 OFFICE O/P EST MOD 30 MIN: CPT | Performed by: PHYSICIAN ASSISTANT

## 2022-10-07 PROCEDURE — 71046 X-RAY EXAM CHEST 2 VIEWS: CPT

## 2022-10-07 RX ORDER — GUAIFENESIN 600 MG/1
600 TABLET, EXTENDED RELEASE ORAL EVERY 12 HOURS SCHEDULED
Qty: 60 TABLET | Refills: 0 | Status: SHIPPED | OUTPATIENT
Start: 2022-10-07

## 2022-10-07 RX ORDER — PREDNISONE 10 MG/1
TABLET ORAL
Qty: 20 TABLET | Refills: 0 | Status: SHIPPED | OUTPATIENT
Start: 2022-10-07

## 2022-10-07 NOTE — PROGRESS NOTES
Assessment/Plan:    1  Cough - post covid, will treat with prednisone, albuterol as needed, mucinex, water, CXR    2  covid - s/p 12 days    F/u as needed    Subjective:   Chief Complaint   Patient presents with    Cold Like Symptoms     Cough, phlegm, SOB and chest pain  5 + days      Patient ID: Elton Coleman is a 50 y o  male  On 9/27/2022 first day sick with covid, fever, body aches, diarrhea, vomiting all resolved now just left with a cough, productive mucus, yellow, fatigued  On day 12  Went back to work 2 days ago        The following portions of the patient's history were reviewed and updated as appropriate: allergies, current medications, past family history, past medical history, past social history, past surgical history and problem list     Past Medical History:   Diagnosis Date    Head ache     Hypertension      Past Surgical History:   Procedure Laterality Date    CARPAL TUNNEL RELEASE Left      Family History   Problem Relation Age of Onset    No Known Problems Mother     No Known Problems Father     Cancer Maternal Grandmother     Cancer Maternal Grandfather     Substance Abuse Brother     Substance Abuse Brother     Alcohol abuse Neg Hx     Mental illness Neg Hx      Social History     Socioeconomic History    Marital status: Single     Spouse name: Not on file    Number of children: Not on file    Years of education: Not on file    Highest education level: Not on file   Occupational History    Not on file   Tobacco Use    Smoking status: Current Every Day Smoker     Packs/day: 0 50     Types: Cigarettes    Smokeless tobacco: Never Used   Vaping Use    Vaping Use: Never used   Substance and Sexual Activity    Alcohol use: Not Currently     Comment: Everyday     Drug use: No    Sexual activity: Yes     Partners: Female   Other Topics Concern    Not on file   Social History Narrative    Not on file     Social Determinants of Health     Financial Resource Strain: Not on file   Food Insecurity: Not on file   Transportation Needs: Not on file   Physical Activity: Not on file   Stress: Not on file   Social Connections: Not on file   Intimate Partner Violence: Not on file   Housing Stability: Not on file       Current Outpatient Medications:     albuterol (Ventolin HFA) 90 mcg/act inhaler, Inhale 2 puffs every 6 (six) hours as needed for wheezing, Disp: 18 g, Rfl: 0    aspirin 81 mg chewable tablet, Chew 81 mg daily, Disp: , Rfl:     Cyanocobalamin 1000 MCG SUBL, Place 1 tablet (1,000 mcg total) under the tongue daily, Disp: 90 tablet, Rfl: 0    losartan (Cozaar) 50 mg tablet, Take 1 tablet (50 mg total) by mouth daily, Disp: 90 tablet, Rfl: 3    mometasone (ELOCON) 0 1 % cream, Apply topically daily, Disp: 45 g, Rfl: 3    multivitamin (THERAGRAN) TABS, Take 1 tablet by mouth daily, Disp: , Rfl:     Current Facility-Administered Medications:     cyanocobalamin injection 1,000 mcg, 1,000 mcg, Intramuscular, Q30 Days, Jeison Johnson MD, 1,000 mcg at 09/14/20 0841    Review of Systems          Objective:    Vitals:    10/07/22 1045   BP: 130/70   Pulse: 90   Resp: 18   Temp: 98 6 °F (37 °C)   TempSrc: Oral   SpO2: 97%   Weight: 124 kg (273 lb)   Height: 5' 8" (1 727 m)        Physical Exam  Constitutional:       Appearance: Normal appearance  He is normal weight  HENT:      Head: Normocephalic and atraumatic  Right Ear: Tympanic membrane, ear canal and external ear normal       Left Ear: Tympanic membrane, ear canal and external ear normal       Nose: Congestion and rhinorrhea present  Mouth/Throat:      Mouth: Mucous membranes are moist       Pharynx: Oropharynx is clear  Cardiovascular:      Rate and Rhythm: Normal rate and regular rhythm  Pulses: Normal pulses  Heart sounds: Normal heart sounds  Pulmonary:      Effort: Pulmonary effort is normal       Breath sounds: Wheezing present  Musculoskeletal:         General: Normal range of motion  Cervical back: Normal range of motion and neck supple  Lymphadenopathy:      Cervical: No cervical adenopathy  Skin:     General: Skin is warm  Neurological:      General: No focal deficit present  Mental Status: He is alert  Psychiatric:         Mood and Affect: Mood normal          Thought Content:  Thought content normal          Judgment: Judgment normal

## 2022-10-11 ENCOUNTER — APPOINTMENT (OUTPATIENT)
Dept: RADIOLOGY | Facility: HOSPITAL | Age: 52
End: 2022-10-11
Payer: COMMERCIAL

## 2022-10-11 ENCOUNTER — HOSPITAL ENCOUNTER (EMERGENCY)
Facility: HOSPITAL | Age: 52
Discharge: HOME | End: 2022-10-11
Attending: EMERGENCY MEDICINE
Payer: COMMERCIAL

## 2022-10-11 ENCOUNTER — APPOINTMENT (EMERGENCY)
Dept: RADIOLOGY | Facility: HOSPITAL | Age: 52
End: 2022-10-11
Payer: COMMERCIAL

## 2022-10-11 ENCOUNTER — OFFICE VISIT (OUTPATIENT)
Dept: FAMILY MEDICINE CLINIC | Facility: CLINIC | Age: 52
End: 2022-10-11
Payer: COMMERCIAL

## 2022-10-11 ENCOUNTER — TELEPHONE (OUTPATIENT)
Dept: FAMILY MEDICINE CLINIC | Facility: CLINIC | Age: 52
End: 2022-10-11

## 2022-10-11 VITALS
HEIGHT: 69 IN | OXYGEN SATURATION: 98 % | WEIGHT: 281.4 LBS | DIASTOLIC BLOOD PRESSURE: 80 MMHG | RESPIRATION RATE: 16 BRPM | BODY MASS INDEX: 41.68 KG/M2 | TEMPERATURE: 98 F | SYSTOLIC BLOOD PRESSURE: 122 MMHG | HEART RATE: 81 BPM

## 2022-10-11 VITALS
OXYGEN SATURATION: 95 % | TEMPERATURE: 98 F | HEART RATE: 70 BPM | DIASTOLIC BLOOD PRESSURE: 66 MMHG | SYSTOLIC BLOOD PRESSURE: 119 MMHG | RESPIRATION RATE: 20 BRPM

## 2022-10-11 DIAGNOSIS — Q04.8 CEREBELLAR TONSILLAR ECTOPIA (HCC): ICD-10-CM

## 2022-10-11 DIAGNOSIS — R55 SYNCOPE: ICD-10-CM

## 2022-10-11 DIAGNOSIS — R55 NEAR SYNCOPE: Primary | ICD-10-CM

## 2022-10-11 DIAGNOSIS — G95.19 EDEMA OF SPINAL CORD (HCC): ICD-10-CM

## 2022-10-11 DIAGNOSIS — U07.1 COVID-19: ICD-10-CM

## 2022-10-11 DIAGNOSIS — G95.9 CERVICAL MYELOPATHY (HCC): ICD-10-CM

## 2022-10-11 LAB
2HR DELTA HS TROPONIN: 0 NG/L
ANION GAP SERPL CALCULATED.3IONS-SCNC: 6 MMOL/L (ref 4–13)
BASOPHILS # BLD AUTO: 0.05 THOUSANDS/ΜL (ref 0–0.1)
BASOPHILS NFR BLD AUTO: 1 % (ref 0–1)
BUN SERPL-MCNC: 16 MG/DL (ref 5–25)
CALCIUM SERPL-MCNC: 8.7 MG/DL (ref 8.3–10.1)
CARDIAC TROPONIN I PNL SERPL HS: 3 NG/L
CARDIAC TROPONIN I PNL SERPL HS: 3 NG/L
CHLORIDE SERPL-SCNC: 109 MMOL/L (ref 96–108)
CO2 SERPL-SCNC: 25 MMOL/L (ref 21–32)
CREAT SERPL-MCNC: 1.01 MG/DL (ref 0.6–1.3)
EOSINOPHIL # BLD AUTO: 0.01 THOUSAND/ΜL (ref 0–0.61)
EOSINOPHIL NFR BLD AUTO: 0 % (ref 0–6)
ERYTHROCYTE [DISTWIDTH] IN BLOOD BY AUTOMATED COUNT: 11.9 % (ref 11.6–15.1)
GFR SERPL CREATININE-BSD FRML MDRD: 86 ML/MIN/1.73SQ M
GLUCOSE SERPL-MCNC: 95 MG/DL (ref 65–140)
HCT VFR BLD AUTO: 45.4 % (ref 36.5–49.3)
HGB BLD-MCNC: 15.2 G/DL (ref 12–17)
IMM GRANULOCYTES # BLD AUTO: 0.05 THOUSAND/UL (ref 0–0.2)
IMM GRANULOCYTES NFR BLD AUTO: 1 % (ref 0–2)
LYMPHOCYTES # BLD AUTO: 0.93 THOUSANDS/ΜL (ref 0.6–4.47)
LYMPHOCYTES NFR BLD AUTO: 12 % (ref 14–44)
MCH RBC QN AUTO: 30.3 PG (ref 26.8–34.3)
MCHC RBC AUTO-ENTMCNC: 33.5 G/DL (ref 31.4–37.4)
MCV RBC AUTO: 91 FL (ref 82–98)
MONOCYTES # BLD AUTO: 1.29 THOUSAND/ΜL (ref 0.17–1.22)
MONOCYTES NFR BLD AUTO: 16 % (ref 4–12)
NEUTROPHILS # BLD AUTO: 5.77 THOUSANDS/ΜL (ref 1.85–7.62)
NEUTS SEG NFR BLD AUTO: 70 % (ref 43–75)
NRBC BLD AUTO-RTO: 0 /100 WBCS
PLATELET # BLD AUTO: 262 THOUSANDS/UL (ref 149–390)
PMV BLD AUTO: 9.2 FL (ref 8.9–12.7)
POTASSIUM SERPL-SCNC: 4.2 MMOL/L (ref 3.5–5.3)
RBC # BLD AUTO: 5.01 MILLION/UL (ref 3.88–5.62)
SODIUM SERPL-SCNC: 140 MMOL/L (ref 135–147)
WBC # BLD AUTO: 8.1 THOUSAND/UL (ref 4.31–10.16)

## 2022-10-11 PROCEDURE — 96361 HYDRATE IV INFUSION ADD-ON: CPT

## 2022-10-11 PROCEDURE — 84484 ASSAY OF TROPONIN QUANT: CPT

## 2022-10-11 PROCEDURE — 93005 ELECTROCARDIOGRAM TRACING: CPT

## 2022-10-11 PROCEDURE — 71045 X-RAY EXAM CHEST 1 VIEW: CPT

## 2022-10-11 PROCEDURE — 85025 COMPLETE CBC W/AUTO DIFF WBC: CPT

## 2022-10-11 PROCEDURE — 99284 EMERGENCY DEPT VISIT MOD MDM: CPT

## 2022-10-11 PROCEDURE — G1004 CDSM NDSC: HCPCS

## 2022-10-11 PROCEDURE — 96375 TX/PRO/DX INJ NEW DRUG ADDON: CPT

## 2022-10-11 PROCEDURE — 99214 OFFICE O/P EST MOD 30 MIN: CPT | Performed by: PHYSICIAN ASSISTANT

## 2022-10-11 PROCEDURE — 99285 EMERGENCY DEPT VISIT HI MDM: CPT | Performed by: EMERGENCY MEDICINE

## 2022-10-11 PROCEDURE — 36415 COLL VENOUS BLD VENIPUNCTURE: CPT

## 2022-10-11 PROCEDURE — 70450 CT HEAD/BRAIN W/O DYE: CPT

## 2022-10-11 PROCEDURE — 99244 OFF/OP CNSLTJ NEW/EST MOD 40: CPT | Performed by: PSYCHIATRY & NEUROLOGY

## 2022-10-11 PROCEDURE — 80048 BASIC METABOLIC PNL TOTAL CA: CPT

## 2022-10-11 PROCEDURE — 96374 THER/PROPH/DIAG INJ IV PUSH: CPT

## 2022-10-11 RX ORDER — KETOROLAC TROMETHAMINE 30 MG/ML
30 INJECTION, SOLUTION INTRAMUSCULAR; INTRAVENOUS ONCE
Status: COMPLETED | OUTPATIENT
Start: 2022-10-11 | End: 2022-10-11

## 2022-10-11 RX ORDER — METOCLOPRAMIDE HYDROCHLORIDE 5 MG/ML
10 INJECTION INTRAMUSCULAR; INTRAVENOUS ONCE
Status: COMPLETED | OUTPATIENT
Start: 2022-10-11 | End: 2022-10-11

## 2022-10-11 RX ORDER — DIPHENHYDRAMINE HYDROCHLORIDE 50 MG/ML
25 INJECTION INTRAMUSCULAR; INTRAVENOUS ONCE
Status: COMPLETED | OUTPATIENT
Start: 2022-10-11 | End: 2022-10-11

## 2022-10-11 RX ORDER — MAGNESIUM SULFATE HEPTAHYDRATE 40 MG/ML
2 INJECTION, SOLUTION INTRAVENOUS ONCE
Status: DISCONTINUED | OUTPATIENT
Start: 2022-10-11 | End: 2022-10-11 | Stop reason: HOSPADM

## 2022-10-11 RX ADMIN — SODIUM CHLORIDE 1000 ML: 0.9 INJECTION, SOLUTION INTRAVENOUS at 12:46

## 2022-10-11 RX ADMIN — KETOROLAC TROMETHAMINE 30 MG: 30 INJECTION, SOLUTION INTRAMUSCULAR; INTRAVENOUS at 12:48

## 2022-10-11 RX ADMIN — DIPHENHYDRAMINE HYDROCHLORIDE 25 MG: 50 INJECTION INTRAMUSCULAR; INTRAVENOUS at 12:49

## 2022-10-11 RX ADMIN — METOCLOPRAMIDE 10 MG: 5 INJECTION, SOLUTION INTRAMUSCULAR; INTRAVENOUS at 12:52

## 2022-10-11 NOTE — TELEPHONE ENCOUNTER
Spoke to patient ER would only give him a note for today that is it  Er did not clear him to go to work only to be out for today  Pt stated he will go to work but  does not feel comfortable  He is working on getting in at [place]  Are we able to write the note for him to be out of work    PT will call us when he gets in at [place]  Pt did state he blacked out at ER and did not do anything he said

## 2022-10-11 NOTE — CONSULTS
Consultation - Neurology   David Monahan 50 y o  male MRN: [id number]  Unit/Bed#: [facility] Encounter: [id number]      Assessment/Plan   Near syncope, recurrent  Assessment & Plan  David Monahan is a 50 y o  male with recurrent near syncope, known cervical cord edema, HTN, NASIM, B-12 deficiency, tobacco abuse, recent COVID infection who presented to [facility] ED on 10/11/2022 with multiple near syncopal events  Near syncope followed by tussive headache, provoked by coughing episodes in the setting of recent COVID infection  Patient has a history of recurrent near syncopal episode, now worsened in the setting of COVID  Near syncope workup completed in the past, unremarkable  Etiology of previous cervical cord edema unclear, recommend [facility]  Plan:  - Recommend MRI Brain w wo and MRI C-spine w wo, however patient wishes to have [facility] completed with [facility]; okay to obtain in outpatient setting  - Recommend checking vitamin B12  - S/p Benadryl, Toradol, Reglan, IVF   - Recommend cough suppressant   - Continue Toradol PRN  - Consider Indomethacin for headache PRN  - Instructed patient to avoid driving and working for the time being  - Patient reports he will be following up with [facility] will need follow up in in 4 weeks with headache attending  He will require a MRI Brain and C-spine w wo within 4 weeks  Patient wishes to follow [facility] with [facility]  History of Present Illness     Reason for Consult / Principal Problem:  Near syncope    HPI: David Monahan is a 50 y o   male with recurrent near syncope, known cervical cord edema, HTN, NASIM, B-12 deficiency, tobacco abuse, recent COVID infection who presented to [facility] ED on 10/11/2022 with multiple near syncopal events  Per chart review, patient has a history of recurrent syncopal episodes it appears to have been initially seen by Neurology in July 2020   Sleep-deprived EEG (July 2020) normal   24 hour Holter monitor unremarkable  Carotid Doppler UA was unremarkable  MRI brain (July 2020) demonstrated 5 mm cerebellar ectopia  MRI C-spine (July 2020) demonstrated cervical cord edema  Patient was seen by MS specialist in September 2020 who felt cervical lesion was concerning for ischemic changes  Patient was also found to have antithrombin 3 deficiency at that time  Patient was also found to have vitamin B12 deficiency and intrinsic factor antibodies positive  It does not appear that patient had followed up with Neurology following his last visit in October 2020  Patient reports his near syncopal episodes have slowed down since 2020  Patient reports that he had COVID 3 weeks ago and since this infection has been coughing more than usual   Patient states that he continues to cough today, denying any shortness of breath  Patient reports that with increased coughing, he has had a significant increase in his near syncopal episodes  Patient states that he had a syncopal episode where he passed out fully approximally 1 week ago, stating he was out for a couple of seconds  Patient states that today he had 5-6 near syncopal events following a coughing episode, denying any syncope today  Patient reports that he has had sneezing fits, stating he has been okay with these and denies any near-syncope when he sneezes  Unable to state the has the near syncopal feeling when attempting to have a bowel movement  Patient states that after his near syncopal episodes, he experiences a severe headache  Patient reports the headache lasts several hours and describes it as "feels like my head is going to explode"  Patient states that in the past Toradol has helped with his headaches  Denies any associated photophobia, phonophobia,  N/V, visual changes, floaters, weakness, numbness  Denies waking up from his headaches  Patient states he does not get headaches otherwise  Admits to tobacco abuse  Denies ETOH abuse  Denies stroke history, cancer history  Unaware of blood clotting disorders     Patient [facility] ED on 10/11/2022  CT head unremarkable for acute intracranial abnormalities  S/p migraine cocktail including Benadryl, Reglan, Toradol  States he wished to get imaging done with [facility]  Currently denies chest pain, shortness of breath, abdominal pain, nausea, vomiting, headache, weakness, numbness/tingling  Consult to neurology  Consult performed by: Bal Shelby PA-C  Consult ordered by: Michelle Shaffer MD        Review of Systems  12 point ROS performed, as stated above, all others negative  Historical Information   Past Medical History:   Diagnosis Date   • Head ache    • Hypertension      Past Surgical History:   Procedure Laterality Date   • CARPAL TUNNEL RELEASE Left      Social History   Social History     Substance and Sexual Activity   Alcohol Use Not Currently    Comment: Everyday      Social History     Substance and Sexual Activity   Drug Use No     E-Cigarette/Vaping   • E-Cigarette Use Never User      E-Cigarette/Vaping Substances     Social History     Tobacco Use   Smoking Status Current Every Day Smoker   • Packs/day: 0 50   • Types: Cigarettes   Smokeless Tobacco Never Used     Family History:   Family History   Problem Relation Age of Onset   • No Known Problems Mother    • No Known Problems Father    • Cancer Maternal Grandmother    • Cancer Maternal Grandfather    • Substance Abuse Brother    • Substance Abuse Brother    • Alcohol abuse Neg Hx    • Mental illness Neg Hx        Review of previous medical records was completed      Meds/Allergies   all current active meds have been reviewed, current meds:   Current Facility-Administered Medications   Medication Dose Route Frequency   • cyanocobalamin injection 1,000 mcg  1,000 mcg Intramuscular Q30 Days   • magnesium sulfate 2 g/50 mL IVPB (premix) 2 g  2 g Intravenous Once   , PTA meds:   Prior to Admission Medications Prescriptions Last Dose Informant Patient Reported? Taking? Cyanocobalamin 1000 MCG SUBL  Self No No   Sig: Place 1 tablet (1,000 mcg total) under the tongue daily   albuterol (Ventolin HFA) 90 mcg/act inhaler   No No   Sig: Inhale 2 puffs every 6 (six) hours as needed for wheezing   aspirin 81 mg chewable tablet   Yes No   Sig: Chew 81 mg daily   guaiFENesin (MUCINEX) 600 mg 12 hr tablet   No No   Sig: Take 1 tablet (600 mg total) by mouth every 12 (twelve) hours   losartan (Cozaar) 50 mg tablet   No No   Sig: Take 1 tablet (50 mg total) by mouth daily   mometasone (ELOCON) 0 1 % cream   No No   Sig: Apply topically daily   multivitamin (THERAGRAN) TABS   Yes No   Sig: Take 1 tablet by mouth daily   predniSONE 10 mg tablet   No No   Sig: Take 4 tabs on day 1,2 with food, 3 tabs on day 3,4 with food, 2 tabs on day 5,6 with food, 1 tab on day 7,8 with food      Facility-Administered Medications Last Administration Doses Remaining   cyanocobalamin injection 1,000 mcg 9/14/2020  8:41 AM        and     Allergies   Allergen Reactions   • Lisinopril Cough       Objective   Vitals:Blood pressure 119/66, pulse 70, temperature 98 °F (36 7 °C), resp  rate 20, SpO2 95 %  ,There is no height or weight on file to calculate BMI  No intake or output data in the 24 hours ending 10/11/22 1511    Invasive Devices: Invasive Devices  Report    Peripheral Intravenous Line  Duration           Peripheral IV 10/11/22 Left Forearm <1 day              Physical Exam  Vitals and nursing note reviewed  Constitutional:       General: He is not in acute distress  Appearance: Normal appearance  He is not ill-appearing, toxic-appearing or diaphoretic  HENT:      Head: Normocephalic and atraumatic  Eyes:      General: No scleral icterus  Right eye: No discharge  Left eye: No discharge        Extraocular Movements: Extraocular movements intact and EOM normal       Conjunctiva/sclera: Conjunctivae normal       Pupils: Pupils are equal, round, and reactive to light  Musculoskeletal:         General: Normal range of motion  Cervical back: Normal range of motion and neck supple  Skin:     General: Skin is warm and dry  Coloration: Skin is not jaundiced or pale  Findings: No bruising, erythema, lesion or rash  Neurological:      Mental Status: He is alert  Psychiatric:         Mood and Affect: Mood normal          Behavior: Behavior normal          Thought Content: Thought content normal          Judgment: Judgment normal        Neurologic Exam     Mental Status   Patient is alert, sitting up in bed  Oriented x 3  No dysarthria or aphasia noted  Able to follow central and appendicular commands and answers all questions appropriately  Cranial Nerves     CN II   Visual fields full to confrontation  CN III, IV, VI   Pupils are equal, round, and reactive to light  Extraocular motions are normal    Nystagmus: none   Upgaze: normal  Downgaze: normal  Conjugate gaze: present    CN V   Facial sensation intact  CN VII   Facial expression full, symmetric  CN VIII   Hearing: intact    CN XI   CN XI normal      CN XII   Tongue deviation: none    Motor Exam   Muscle bulk: normal  Overall muscle tone: normal  Right arm pronator drift: absent  Left arm pronator drift: absent  Bilateral upper and lower extremity strength testing 5/5 throughout      Sensory Exam   Light touch normal      Gait, Coordination, and Reflexes     Tremor   Resting tremor: absent  No ataxia or dysmetria noted in BUE finger to nose testing     Bilateral upper and lower extremity reflexes 2+ throughout  No evidence of ankle clonus    No involuntary movements noted throughout exam      Lab Results: I have personally reviewed pertinent reports    Recent Results (from the past 24 hour(s))   ECG 12 lead    Collection Time: 10/11/22 12:20 PM   Result Value Ref Range    Ventricular Rate 82 BPM    Atrial Rate 82 BPM    MS Interval 132 ms QRSD Interval 94 ms    QT Interval 358 ms    QTC Interval 418 ms    P [place] 67 degrees    QRS Axis 75 degrees    T Wave Axis 53 degrees   CBC and differential    Collection Time: 10/11/22 12:48 PM   Result Value Ref Range    WBC 8 10 4 31 - 10 16 Thousand/uL    RBC 5 01 3 88 - 5 62 Million/uL    Hemoglobin 15 2 12 0 - 17 0 g/dL    Hematocrit 45 4 36 5 - 49 3 %    MCV 91 82 - 98 fL    MCH 30 3 26 8 - 34 3 pg    MCHC 33 5 31 4 - 37 4 g/dL    RDW 11 9 11 6 - 15 1 %    MPV 9 2 8 9 - 12 7 fL    Platelets 845 431 - 967 Thousands/uL    nRBC 0 /100 WBCs    Neutrophils Relative 70 43 - 75 %    Immat GRANS % 1 0 - 2 %    Lymphocytes Relative 12 (L) 14 - 44 %    Monocytes Relative 16 (H) 4 - 12 %    Eosinophils Relative 0 0 - 6 %    Basophils Relative 1 0 - 1 %    Neutrophils Absolute 5 77 1 85 - 7 62 Thousands/µL    Immature Grans Absolute 0 05 0 00 - 0 20 Thousand/uL    Lymphocytes Absolute 0 93 0 60 - 4 47 Thousands/µL    Monocytes Absolute 1 29 (H) 0 17 - 1 22 Thousand/µL    Eosinophils Absolute 0 01 0 00 - 0 61 Thousand/µL    Basophils Absolute 0 05 0 00 - 0 10 Thousands/µL   Basic metabolic panel    Collection Time: 10/11/22 12:48 PM   Result Value Ref Range    Sodium 140 135 - 147 mmol/L    Potassium 4 2 3 5 - 5 3 mmol/L    Chloride 109 (H) 96 - 108 mmol/L    CO2 25 21 - 32 mmol/L    ANION GAP 6 4 - 13 mmol/L    BUN 16 5 - 25 mg/dL    Creatinine 1 01 0 60 - 1 30 mg/dL    Glucose 95 65 - 140 mg/dL    Calcium 8 7 8 3 - 10 1 mg/dL    eGFR 86 ml/min/1 73sq m   HS Troponin 0hr (reflex protocol)    Collection Time: 10/11/22 12:48 PM   Result Value Ref Range    hs TnI 0hr 3 "Refer to ACS Flowchart"- see link ng/L   ]  Imaging Studies: I have personally reviewed pertinent reports and I have personally reviewed pertinent films in PACS  EKG, Pathology, and Other Studies: I have personally reviewed pertinent reports      VTE Prophylaxis:  Patient in the ED    Dictation voice to text software has been used in the creation of this document  Please consider this in light of any contextual or grammatical errors

## 2022-10-11 NOTE — ED PROVIDER NOTES
History  Chief Complaint   Patient presents with   • Syncope     Pt reports multiple episodes of syncope when coughing; pt was seen at family doctor and was referred to ED; pt has been seen by neurology in past for same; pt states after he passes out he has an intense headache     Patient is a 53-year-old male presents to the emergency department today after experiencing 3 syncopal episodes  Patient states he experienced his 1st syncopal episode while at work  The next 2 were over at his primary care doctor's office  Patient takes baby aspirin once a day  Patient denies any head strikes  Patient has been evaluated by [facility] Neurology in the past for syncopal episodes  He states he has syncopal events when he coughs  He states he is getting a 2nd opinion at [facility] for the syncopal episodes  He said his headache is sudden in onset, maximal in onset  He denies any neck stiffness, neck pain, blurry vision  He states he always gets severe, sudden onset headaches when he has syncopal events  Patient smokes half pack per day for the last 25 years  Patient denies alcohol use  MRI on 07/08/2020 showed 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 Taking?    Cyanocobalamin 1000 MCG SUBL  Self No No   Sig: Place 1 tablet (1,000 mcg total) under the tongue daily   albuterol (Ventolin HFA) 90 mcg/act inhaler   No No   Sig: Inhale 2 puffs every 6 (six) hours as needed for wheezing   aspirin 81 mg chewable tablet   Yes No   Sig: Chew 81 mg daily   guaiFENesin (MUCINEX) 600 mg 12 hr tablet   No No   Sig: Take 1 tablet (600 mg total) by mouth every 12 (twelve) hours   losartan (Cozaar) 50 mg tablet   No No   Sig: Take 1 tablet (50 mg total) by mouth daily   mometasone (ELOCON) 0 1 % cream   No No   Sig: Apply topically daily   multivitamin (THERAGRAN) TABS   Yes No   Sig: Take 1 tablet by mouth daily   predniSONE 10 mg tablet   No No   Sig: Take 4 tabs on day 1,2 with food, 3 tabs on day 3,4 with food, 2 tabs on day 5,6 with food, 1 tab on day 7,8 with food      Facility-Administered Medications Last Administration Doses Remaining   cyanocobalamin injection 1,000 mcg 9/14/2020  8:41 AM           Past Medical History:   Diagnosis Date   • Head ache    • Hypertension        Past Surgical History:   Procedure Laterality Date   • CARPAL TUNNEL RELEASE Left        Family History   Problem Relation Age of Onset   • No Known Problems Mother    • No Known Problems Father    • Cancer Maternal Grandmother    • Cancer Maternal Grandfather    • Substance Abuse Brother    • Substance Abuse Brother    • Alcohol abuse Neg Hx    • Mental illness Neg Hx      I have reviewed and agree with the history as documented  E-Cigarette/Vaping   • E-Cigarette Use Never User      E-Cigarette/Vaping Substances     Social History     Tobacco Use   • Smoking status: Current Every Day Smoker     Packs/day: 0 50     Types: Cigarettes   • Smokeless tobacco: Never Used   Vaping Use   • Vaping Use: Never used   Substance Use Topics   • Alcohol use: Not Currently     Comment: Everyday    • Drug use: No        Review of Systems   Constitutional: Negative for chills and fever  HENT: Negative for ear pain and sore throat  Eyes: Negative for photophobia, pain and visual disturbance  Respiratory: Negative for cough and shortness of breath  Cardiovascular: Negative for chest pain and palpitations  Gastrointestinal: Negative for abdominal pain, nausea and vomiting  Musculoskeletal: Negative for arthralgias and back pain  Skin: Negative for color change and rash  Neurological: Positive for syncope and headaches   Negative for seizures  All other systems reviewed and are negative  Physical Exam  ED Triage Vitals   Temperature Pulse Respirations Blood Pressure SpO2   10/11/22 1205 10/11/22 1205 10/11/22 1205 10/11/22 1205 10/11/22 1205   98 °F (36 7 °C) 92 20 122/80 97 %      Temp src Heart Rate Source Patient Position - Orthostatic VS BP Location FiO2 (%)   -- 10/11/22 1205 10/11/22 1205 10/11/22 1205 --    Monitor Lying Left arm       Pain Score       10/11/22 1248       7             Orthostatic Vital Signs  Vitals:    10/11/22 1205 10/11/22 1443   BP: 122/80 119/66   Pulse: 92 70   Patient Position - Orthostatic VS: Lying Lying       Physical Exam  Vitals and nursing note reviewed  Constitutional:       Appearance: He is well-developed  HENT:      Head: Normocephalic and atraumatic  Right Ear: Tympanic membrane normal       Left Ear: Tympanic membrane normal    Eyes:      Extraocular Movements: Extraocular movements intact  Conjunctiva/sclera: Conjunctivae normal       Pupils: Pupils are equal, round, and reactive to light  Cardiovascular:      Rate and Rhythm: Normal rate and regular rhythm  Pulses:           Radial pulses are 2+ on the right side and 2+ on the left side  Heart sounds: No murmur heard  Pulmonary:      Effort: Pulmonary effort is normal  No respiratory distress  Breath sounds: Normal breath sounds  Abdominal:      Palpations: Abdomen is soft  Tenderness: There is no abdominal tenderness  Musculoskeletal:      Cervical back: Full passive range of motion without pain, normal range of motion and neck supple  Right lower leg: No edema  Left lower leg: No edema  Skin:     General: Skin is warm and dry  Neurological:      Mental Status: He is alert and oriented to person, place, and time  Mental status is at baseline  GCS: GCS eye subscore is 4  GCS verbal subscore is 5  GCS motor subscore is 6  Cranial Nerves: No cranial nerve deficit        Motor: No weakness  Coordination: Coordination normal       Comments: No cranial nerve deficits  Extraocular movement intact  Muscle strength in upper lower extremities 5/5  Sensation intact to light touch in upper/lower extremities            ED Medications  Medications   magnesium sulfate 2 g/50 mL IVPB (premix) 2 g (0 g Intravenous Hold 10/11/22 1348)   diphenhydrAMINE (BENADRYL) injection 25 mg (25 mg Intravenous Given 10/11/22 1249)   metoclopramide (REGLAN) injection 10 mg (10 mg Intravenous Given 10/11/22 1252)   ketorolac (TORADOL) injection 30 mg (30 mg Intravenous Given 10/11/22 1248)   sodium chloride 0 9 % bolus 1,000 mL (0 mL Intravenous Stopped 10/11/22 1516)       Diagnostic Studies  Results Reviewed     Procedure Component Value Units Date/Time    HS Troponin I 2hr [[id number]]  (Normal) Collected: 10/11/22 1441    Lab Status: Final result Specimen: Blood from Arm, Left Updated: 10/11/22 1543     hs TnI 2hr 3 ng/L      Delta 2hr hsTnI 0 ng/L     HS Troponin 0hr (reflex protocol) [[id number]]  (Normal) Collected: 10/11/22 1248    Lab Status: Final result Specimen: Blood from Arm, Left Updated: 10/11/22 1344     hs TnI 0hr 3 ng/L     Basic metabolic panel [[id number]]  (Abnormal) Collected: 10/11/22 1248    Lab Status: Final result Specimen: Blood from Arm, Left Updated: 10/11/22 1334     Sodium 140 mmol/L      Potassium 4 2 mmol/L      Chloride 109 mmol/L      CO2 25 mmol/L      ANION GAP 6 mmol/L      BUN 16 mg/dL      Creatinine 1 01 mg/dL      Glucose 95 mg/dL      Calcium 8 7 mg/dL      eGFR 86 ml/min/1 73sq m     Narrative:      Samuel guidelines for Chronic Kidney Disease (CKD):   •  Stage 1 with normal or high GFR (GFR > 90 mL/min/1 73 square meters)  •  Stage 2 Mild CKD (GFR = 60-89 mL/min/1 73 square meters)  •  Stage 3A Moderate CKD (GFR = 45-59 mL/min/1 73 square meters)  •  Stage 3B Moderate CKD (GFR = 30-44 mL/min/1 73 square meters)  •  Stage 4 Severe CKD (GFR = 15-29 mL/min/1 73 square meters)  •  Stage 5 End Stage CKD (GFR <15 mL/min/1 73 square meters)  Note: GFR calculation is accurate only with a steady state creatinine    CBC and differential [[id number]]  (Abnormal) Collected: 10/11/22 1248    Lab Status: Final result Specimen: Blood from Arm, Left Updated: 10/11/22 1302     WBC 8 10 Thousand/uL      RBC 5 01 Million/uL      Hemoglobin 15 2 g/dL      Hematocrit 45 4 %      MCV 91 fL      MCH 30 3 pg      MCHC 33 5 g/dL      RDW 11 9 %      MPV 9 2 fL      Platelets 164 Thousands/uL      nRBC 0 /100 WBCs      Neutrophils Relative 70 %      Immat GRANS % 1 %      Lymphocytes Relative 12 %      Monocytes Relative 16 %      Eosinophils Relative 0 %      Basophils Relative 1 %      Neutrophils Absolute 5 77 Thousands/µL      Immature Grans Absolute 0 05 Thousand/uL      Lymphocytes Absolute 0 93 Thousands/µL      Monocytes Absolute 1 29 Thousand/µL      Eosinophils Absolute 0 01 Thousand/µL      Basophils Absolute 0 05 Thousands/µL                  CT head without contrast   Final Result by Ashok Velasco MD (10/11 1403)      No acute intracranial abnormality  Workstation performed: [id number]         XR chest 1 view portable   ED Interpretation by Isaias Schulte DO (10/11 0303)   No signs of acute pathology  Procedures  Procedures      ED Course  ED Course as of 10/11/22 56 [address] Oct 11, 2022   1211 Patient seen and examined  Orders placed  Patient states is syncopal episode when he coughs  Complains of severe headache, which he says normally occurs after syncopal event  Denies head strike  Will perform cardiac workup, CT head, treat headache with migraine cocktail      1235 Procedure Note: EKG  Date/Time: 10/11/22 12:35 PM   Interpreted by: Kayy Canales  Indications / Diagnosis: syncope  ECG reviewed by me, the ED Provider: yes   The EKG demonstrates:  Rate: 82 BPM  Rhythm: normal sinus  Intervals: normal intervals  Axis: normal axis  QRS/Blocks: normal QRS  ST Changes: No acute ST Changes, no STD/LISSETH      1343 XR chest 1 view portable  No signs of acute pathology  1346 hs TnI 0hr: 3   1451 CT head without contrast  IMPRESSION:     No acute intracranial abnormality  1500 Dr Alexander Hernandez from neurology saw the patient  They agreed with the plan for CT scan given complaint of sudden onset and severe headache  After their evaluation they suspected tussive syncope    1603 Delta 2hr hsTnI: 0                             SBIRT 20yo+    Flowsheet Row Most Recent Value   SBIRT (25 yo +)    In order to provide better care to our patients, we are screening all of our patients for alcohol and drug use  Would it be okay to ask you these screening questions? Unable to answer at this time Filed at: 10/11/2022 1210                MDM  Number of Diagnoses or Management Options  Near syncope, recurrent  Syncope  Diagnosis management comments: Patient is a 61-year-old male past medical history cough syncope presents to the emergency department after 3 syncopal events and is complaining of a severe headache  Will perform cardiac workup in CT head  Delta troponin was 0  Initial and 2nd troponin were 3  EKG was unremarkable  Chest x-ray showed no signs acute pathology  CT showed no signs of intracranial hemorrhage, mass shift, masses, or anything concerning  Neurology evaluated the patient in the emergency department  they believe the patient has tussive syncope  They recommended the patient take cough suppression medicine  Patient went it is agreeable to plan to discharge home  Patient states he has follow-up with [facility] for evaluation of syncope and headaches  Patient was given strict return precautions         Disposition  Final diagnoses:   Near syncope, recurrent   Syncope     Time reflects when diagnosis was documented in both MDM as applicable and the Disposition within this note     Time User Action Codes Description Comment    10/11/2022  2:51 PM Cathryne Dus Add [R55] Near syncope, recurrent     10/11/2022  3:06 PM Cathryne Dus Add [R55] Syncope       ED Disposition     ED Disposition   Discharge    Condition   Stable    Date/Time   Tue Oct 11, 2022  3:06 PM    Comment   Meghann Gil discharge to home/self care  Follow-up Information     Follow up With Specialties Details Why Contact Info Additional [address] Emergency Department Emergency Medicine Go to  If symptoms worsen [address] Emergency Department, [address] [place] [address], [address]    Kris Raphael PA-C Family Medicine, Physician Assistant   [facility], [address] [place] [address]  [phone]             Discharge Medication List as of 10/11/2022  3:11 PM      CONTINUE these medications which have NOT CHANGED    Details   albuterol (Ventolin HFA) 90 mcg/act inhaler Inhale 2 puffs every 6 (six) hours as needed for wheezing, Starting Thu 9/29/2022, Normal      aspirin 81 mg chewable tablet Chew 81 mg daily, Historical Med      Cyanocobalamin 1000 MCG SUBL Place 1 tablet (1,000 mcg total) under the tongue daily, Starting Mon 9/14/2020, Normal      guaiFENesin (MUCINEX) 600 mg 12 hr tablet Take 1 tablet (600 mg total) by mouth every 12 (twelve) hours, Starting Fri 10/7/2022, Normal      losartan (Cozaar) 50 mg tablet Take 1 tablet (50 mg total) by mouth daily, Starting Tue 8/23/2022, Normal      mometasone (ELOCON) 0 1 % cream Apply topically daily, Starting Fri 9/10/2021, Normal      multivitamin (THERAGRAN) TABS Take 1 tablet by mouth daily, Historical Med      predniSONE 10 mg tablet Take 4 tabs on day 1,2 with food, 3 tabs on day 3,4 with food, 2 tabs on day 5,6 with food, 1 tab on day 7,8 with food, Normal           No discharge procedures on file      PDMP Review     None ED Provider  Attending physically available and evaluated Charm Arm  I managed the patient along with the ED Attending      Electronically Signed by         Anson Page DO  10/11/22 5465

## 2022-10-11 NOTE — PROGRESS NOTES
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 - initially found during the work up for above back in 8/2020, was advised by [address] [place] [address] Neurosurgery not the cause of patients headaches and presyncopal symptoms  Patient prefers second opinion from [facility] and will work on this with the help of friends who work there    3  Cough s/p covid - no longer infectious, should continue with albuterol, mucinex, fluids, rest    F/u as needed  F/u after neuro eval    Subjective:   Chief Complaint   Patient presents with   • Cough     Since having covid   • Shortness of Breath   • Syncope   • Headache      Patient ID: Ivan Thompson is a 50 y o  male  Patient has been "blacking out" with coughing  This is something that has happened in the past but since covid has gotten worse  Will cough so hard will briefly lose consciousness and then after will have an awful headache that will cause a change in vision  This weekend happened and he woke up on the floor with objects all around like he hit them as going down  Most of the time they are brief  Has seen neurosurgery in the past and felt "he was dismissed"  Has been better since he has not been sick in so long but now since having cough has gotten worse  Today so far has happened 4 times, was at work and was sent home 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         The following portions of the patient's history were reviewed and updated as appropriate: allergies, current medications, past family history, past medical history, past social history, past surgical history and problem list     Past Medical History:   Diagnosis Date   • Head ache    • Hypertension Past Surgical History:   Procedure Laterality Date   • CARPAL TUNNEL RELEASE Left      Family History   Problem Relation Age of Onset   • No Known Problems Mother    • No Known Problems Father    • Cancer Maternal Grandmother    • Cancer Maternal Grandfather    • Substance Abuse Brother    • Substance Abuse Brother    • Alcohol abuse Neg Hx    • Mental illness Neg Hx      Social History     Socioeconomic History   • Marital status: Single     Spouse name: Not on file   • Number of children: Not on file   • Years of education: Not on file   • Highest education level: Not on file   Occupational History   • Not on file   Tobacco Use   • Smoking status: Current Every Day Smoker     Packs/day: 0 50     Types: Cigarettes   • Smokeless tobacco: Never Used   Vaping Use   • Vaping Use: Never used   Substance and Sexual Activity   • Alcohol use: Not Currently     Comment: Everyday    • Drug use: No   • Sexual activity: Yes     Partners: Female   Other Topics Concern   • Not on file   Social History Narrative   • Not on file     Social Determinants of Health     Financial Resource Strain: Not on file   Food Insecurity: Not on file   Transportation Needs: Not on file   Physical Activity: Not on file   Stress: Not on file   Social Connections: Not on file   Intimate Partner Violence: Not on file   Housing Stability: Not on file       Current Outpatient Medications:   •  albuterol (Ventolin HFA) 90 mcg/act inhaler, Inhale 2 puffs every 6 (six) hours as needed for wheezing, Disp: 18 g, Rfl: 0  •  aspirin 81 mg chewable tablet, Chew 81 mg daily, Disp: , Rfl:   •  Cyanocobalamin 1000 MCG SUBL, Place 1 tablet (1,000 mcg total) under the tongue daily, Disp: 90 tablet, Rfl: 0  •  guaiFENesin (MUCINEX) 600 mg 12 hr tablet, Take 1 tablet (600 mg total) by mouth every 12 (twelve) hours, Disp: 60 tablet, Rfl: 0  •  losartan (Cozaar) 50 mg tablet, Take 1 tablet (50 mg total) by mouth daily, Disp: 90 tablet, Rfl: 3  •  mometasone (ELOCON) 0 1 % cream, Apply topically daily, Disp: 45 g, Rfl: 3  •  multivitamin (THERAGRAN) TABS, Take 1 tablet by mouth daily, Disp: , Rfl:   •  predniSONE 10 mg tablet, Take 4 tabs on day 1,2 with food, 3 tabs on day 3,4 with food, 2 tabs on day 5,6 with food, 1 tab on day 7,8 with food, Disp: 20 tablet, Rfl: 0    Current Facility-Administered Medications:   •  cyanocobalamin injection 1,000 mcg, 1,000 mcg, Intramuscular, Q30 Days, Cody Quinn MD, 1,000 mcg at 09/14/20 0841    Review of Systems          Objective:    Vitals:    10/11/22 1045   BP: 122/80   Pulse: 81   Resp: 16   Temp: 98 °F (36 7 °C)   SpO2: 98%   Weight: 128 kg (281 lb 6 4 oz)   Height: 5' 8 5" (1 74 m)        Physical Exam  Constitutional:       Appearance: He is well-developed  HENT:      Head: Normocephalic and atraumatic  Neurological:      General: No focal deficit present  Mental Status: He is alert and oriented to person, place, and time     Psychiatric:         Mood and Affect: Mood normal

## 2022-10-11 NOTE — TELEPHONE ENCOUNTER
Patient is asking for a work note to be out of work  First day out of work was today 10/11 and is asking when you think he should go back?     Please fax to [phone]

## 2022-10-11 NOTE — ED ATTENDING ATTESTATION
10/11/2022  IAngie MD, saw and evaluated the patient  I have discussed the patient with the resident/non-physician practitioner and agree with the resident's/non-physician practitioner's findings, Plan of Care, and MDM as documented in the resident's/non-physician practitioner's note, except where noted  All available labs and Radiology studies were reviewed  I was present for key portions of any procedure(s) performed by the resident/non-physician practitioner and I was immediately available to provide assistance  At this point I agree with the current assessment done in the Emergency Department  I have conducted an independent evaluation of this patient a history and physical is as follows: Two year history of syncope with coughing  Patient had an event today  Patient states it is always followed by severe headache that then slowly fades  Was at his doctor's when he on event today and was sent into the emergency department  Patient states that there has been no change in character or quality of the symptoms  Has a benign exam currently    Will plan to CT, discussed with Neurology  ED Course         Critical Care Time  Procedures

## 2022-10-11 NOTE — TELEPHONE ENCOUNTER
Not until he is cleared by neuro - I see they saw him in the Er today  Is he cleared to return to work? Also see how soon he can get in with [facility]  He really shouldn't return to work while he is having these episodes

## 2022-10-11 NOTE — ASSESSMENT & PLAN NOTE
Dali Blanchard is a 50 y o  male with recurrent near syncope, known cervical cord edema, HTN, NASIM, B-12 deficiency, tobacco abuse, recent COVID infection who presented to [facility] ED on 10/11/2022 with multiple near syncopal events  Near syncope followed by tussive headache, provoked by coughing episodes in the setting of recent COVID infection  Patient has a history of recurrent near syncopal episode, now worsened in the setting of COVID  Near syncope workup completed in the past, unremarkable  Etiology of previous cervical cord edema unclear, recommend follow-up with Neurology at [facility]      Plan:  - Recommend MRI Brain w wo and MRI C-spine w wo, however patient wishes to have imaging completed with [facility]; okay to obtain in outpatient setting  - Recommend checking vitamin B12  - S/p Benadryl, Toradol, Reglan, IVF   - Recommend cough suppressant   - Continue Toradol PRN  - Consider Indomethacin for headache PRN  - Instructed patient to avoid driving and working for the time being  - Patient reports he will be following up with [facility]

## 2022-10-11 NOTE — Clinical Note
Shari Casiano was seen and treated in our emergency department on 10/11/2022  as tolerated    Diagnosis: Syncope    He Barr  is off the rest of the shift today  He may return on this date: 10/12/2022    Shari Casiano was evaluated in [facility] ED on 10/11/22  If you have any questions or concerns, please don't hesitate to call        Maggie Fields DO    ______________________________           _______________          _______________  Hospital Representative                              Date                                Time

## 2022-10-12 LAB
ATRIAL RATE: 82 BPM
P AXIS: 67 DEGREES
PR INTERVAL: 132 MS
QRS AXIS: 75 DEGREES
QRSD INTERVAL: 94 MS
QT INTERVAL: 358 MS
QTC INTERVAL: 418 MS
T WAVE AXIS: 53 DEGREES
VENTRICULAR RATE: 82 BPM

## 2022-10-12 PROCEDURE — 93010 ELECTROCARDIOGRAM REPORT: CPT | Performed by: INTERNAL MEDICINE

## 2022-10-12 NOTE — TELEPHONE ENCOUNTER
Patient called he got an apt with [place] [facility] dr Vero Montano able to write work note for patient to be excused from work from 10/10/2022 - 10/26/2022

## 2022-10-12 NOTE — TELEPHONE ENCOUNTER
Patient called and he is supposed to get a date today from [facility] will call us with date to give him letter

## 2022-10-21 ENCOUNTER — TELEPHONE (OUTPATIENT)
Dept: NEUROLOGY | Facility: CLINIC | Age: 52
End: 2022-10-21

## 2022-10-21 NOTE — TELEPHONE ENCOUNTER
SLB/NEAR SYNCOPE        NOTES: Devora Villaseñor will need follow up in in 4 weeks with headache attending  He will require a MRI Brain and C-spine w wo within 4 weeks  Patient wishes to follow up with [facility] neurology           Removing patient from the patient list

## 2022-10-26 ENCOUNTER — TELEPHONE (OUTPATIENT)
Dept: FAMILY MEDICINE CLINIC | Facility: CLINIC | Age: 52
End: 2022-10-26

## 2022-10-26 NOTE — TELEPHONE ENCOUNTER
Patient did see neurology at [facility] and they are recommending repeat MRIs of the brain and of the neck area to compare to two years ago  However, they state that they are unable to complete his FMLA paperwork unless they are doing surgery, and surgery is not recommended at this time  Patient will be dropping off his FMLA forms to the office today to be completed by his PCP as directed

## 2022-10-26 NOTE — TELEPHONE ENCOUNTER
Patient dropped off forms and some information from visit at [facility]  Patient will call [facility] and ask them to fax us his last visit notes

## 2022-10-26 NOTE — TELEPHONE ENCOUNTER
When you get paperwork please get days, is it a discrete period of time, intermittant? I also will need to see neuros notes and their recommendations prior to completing FMLA regarding his leave going forward

## 2022-10-31 NOTE — TELEPHONE ENCOUNTER
Patient picked up FMLA form and will give to employer  Additional requested info from [employer] was faxed as requested

## 2022-10-31 NOTE — TELEPHONE ENCOUNTER
Done, there is also other paperwork I got that is requesting more information  Please look at what they are asking for, print it off and attach it to the other paperwork  In the future for FMLA, when I request the days the patient is out I need you to write the days down please  The first day he was out, ect  Thank you

## 2022-11-01 ENCOUNTER — OFFICE VISIT (OUTPATIENT)
Dept: FAMILY MEDICINE CLINIC | Facility: CLINIC | Age: 52
End: 2022-11-01

## 2022-11-01 VITALS
HEIGHT: 69 IN | BODY MASS INDEX: 42.45 KG/M2 | HEART RATE: 75 BPM | WEIGHT: 286.6 LBS | DIASTOLIC BLOOD PRESSURE: 76 MMHG | RESPIRATION RATE: 16 BRPM | SYSTOLIC BLOOD PRESSURE: 120 MMHG

## 2022-11-01 DIAGNOSIS — F17.200 SMOKER: Primary | ICD-10-CM

## 2022-11-01 RX ORDER — VARENICLINE TARTRATE 25 MG
KIT ORAL
Qty: 53 EACH | Refills: 0 | Status: SHIPPED | OUTPATIENT
Start: 2022-11-01 | End: 2022-11-29

## 2022-11-06 NOTE — PROGRESS NOTES
Assessment/Plan:    1  Smoker - discussed options at length, will trial chantix to start  Encouraged to take medication for at least 4-6 months to change lifestyle    F/u as needed      Subjective:   Chief Complaint   Patient presents with   • Nicotine Dependence     Would like to stop smoking       Patient ID: Marylin Jama is a 50 y o  male  Patient here to discuss quitting smoking, requesting help  He knows it is not good for him  Recently had covid and coughing was "bad" feels quitting smoking would help  Here to discuss options        The following portions of the patient's history were reviewed and updated as appropriate: allergies, current medications, past family history, past medical history, past social history, past surgical history and problem list     Past Medical History:   Diagnosis Date   • Head ache    • Hypertension      Past Surgical History:   Procedure Laterality Date   • CARPAL TUNNEL RELEASE Left      Family History   Problem Relation Age of Onset   • No Known Problems Mother    • No Known Problems Father    • Cancer Maternal Grandmother    • Cancer Maternal Grandfather    • Substance Abuse Brother    • Substance Abuse Brother    • Alcohol abuse Neg Hx    • Mental illness Neg Hx      Social History     Socioeconomic History   • Marital status: Single     Spouse name: Not on file   • Number of children: Not on file   • Years of education: Not on file   • Highest education level: Not on file   Occupational History   • Not on file   Tobacco Use   • Smoking status: Current Every Day Smoker     Packs/day: 0 50     Types: Cigarettes   • Smokeless tobacco: Never Used   Vaping Use   • Vaping Use: Never used   Substance and Sexual Activity   • Alcohol use: Not Currently     Comment: Everyday    • Drug use: No   • Sexual activity: Yes     Partners: Female   Other Topics Concern   • Not on file   Social History Narrative   • Not on file     Social Determinants of Health     Financial Resource Strain: Not on file   Food Insecurity: Not on file   Transportation Needs: Not on file   Physical Activity: Not on file   Stress: Not on file   Social Connections: Not on file   Intimate Partner Violence: Not on file   Housing Stability: Not on file       Current Outpatient Medications:   •  aspirin 81 mg chewable tablet, Chew 81 mg daily, Disp: , Rfl:   •  Cyanocobalamin 1000 MCG SUBL, Place 1 tablet (1,000 mcg total) under the tongue daily, Disp: 90 tablet, Rfl: 0  •  losartan (Cozaar) 50 mg tablet, Take 1 tablet (50 mg total) by mouth daily, Disp: 90 tablet, Rfl: 3  •  mometasone (ELOCON) 0 1 % cream, Apply topically daily, Disp: 45 g, Rfl: 3  •  multivitamin (THERAGRAN) TABS, Take 1 tablet by mouth daily, Disp: , Rfl:   •  varenicline 0 5 MG X 11 & 1 MG X 42 tablet therapy pack, Take 0 5 mg by mouth daily for 3 days, THEN 0 5 mg 2 (two) times a day for 4 days, THEN 1 mg 2 (two) times a day for 21 days  , Disp: 53 each, Rfl: 0    Current Facility-Administered Medications:   •  cyanocobalamin injection 1,000 mcg, 1,000 mcg, Intramuscular, Q30 Days, Dara Bardales MD, 1,000 mcg at 09/14/20 0841    Review of Systems          Objective:    Vitals:    11/01/22 1755   BP: 120/76   Pulse: 75   Resp: 16   Weight: 130 kg (286 lb 9 6 oz)   Height: 5' 8 5" (1 74 m)        Physical Exam  Constitutional:       Appearance: Normal appearance  He is normal weight  HENT:      Head: Normocephalic and atraumatic  Cardiovascular:      Rate and Rhythm: Normal rate and regular rhythm  Pulses: Normal pulses  Heart sounds: Normal heart sounds  Pulmonary:      Effort: Pulmonary effort is normal       Breath sounds: Normal breath sounds  Musculoskeletal:      Cervical back: Normal range of motion and neck supple  Neurological:      General: No focal deficit present  Mental Status: He is alert and oriented to person, place, and time     Psychiatric:         Mood and Affect: Mood normal          Behavior: Behavior normal          Thought Content:  Thought content normal          Judgment: Judgment normal

## 2022-11-20 ENCOUNTER — HOSPITAL ENCOUNTER (OUTPATIENT)
Dept: MRI IMAGING | Facility: HOSPITAL | Age: 52
Discharge: HOME | End: 2022-11-20

## 2022-11-20 DIAGNOSIS — M54.2 CERVICALGIA: ICD-10-CM

## 2022-11-20 DIAGNOSIS — M54.12 RADICULOPATHY, CERVICAL REGION: ICD-10-CM

## 2022-11-20 RX ADMIN — GADOBUTROL 13 ML: 604.72 INJECTION INTRAVENOUS at 07:54

## 2022-12-07 ENCOUNTER — TELEPHONE (OUTPATIENT)
Dept: FAMILY MEDICINE CLINIC | Facility: CLINIC | Age: 52
End: 2022-12-07

## 2022-12-07 NOTE — TELEPHONE ENCOUNTER
PATIENT IS CALLING HE IS HAVING SURGERY AT [facility]  THEY FOUND AN ACUTE LEVEL OF FLUID ON BRAIN SO THEY WILL CUT SKULL  FOUR HOURS LONG AND IN HOSPITAL TO A WEEK  PATIENT SCARED BUT WAS TOLD HE SHOULD BECAUSE IT IS GETTING BIGGER AND DOES NOT WANT TO HAVE STROKES  SURGERY IS January 3      PATIENT GOING TO TRY TO GET RECORDS SENT TO YOU

## 2023-01-16 ENCOUNTER — OFFICE VISIT (OUTPATIENT)
Dept: FAMILY MEDICINE CLINIC | Facility: CLINIC | Age: 53
End: 2023-01-16

## 2023-01-16 ENCOUNTER — TELEPHONE (OUTPATIENT)
Dept: FAMILY MEDICINE CLINIC | Facility: CLINIC | Age: 53
End: 2023-01-16

## 2023-01-16 VITALS
SYSTOLIC BLOOD PRESSURE: 120 MMHG | DIASTOLIC BLOOD PRESSURE: 76 MMHG | HEIGHT: 69 IN | BODY MASS INDEX: 41.04 KG/M2 | WEIGHT: 277.1 LBS | RESPIRATION RATE: 16 BRPM | HEART RATE: 110 BPM

## 2023-01-16 DIAGNOSIS — R51.9 GENERALIZED HEADACHES: ICD-10-CM

## 2023-01-16 DIAGNOSIS — I10 ESSENTIAL HYPERTENSION: ICD-10-CM

## 2023-01-16 DIAGNOSIS — G93.5 CHIARI MALFORMATION TYPE I (HCC): Primary | ICD-10-CM

## 2023-01-16 PROBLEM — R55 NEUROGENIC SYNCOPE: Status: ACTIVE | Noted: 2022-12-14

## 2023-01-16 PROBLEM — Z01.818 PREOP EXAMINATION: Status: ACTIVE | Noted: 2022-12-13

## 2023-01-16 PROBLEM — E66.813 CLASS 3 SEVERE OBESITY WITH SERIOUS COMORBIDITY AND BODY MASS INDEX (BMI) OF 40.0 TO 44.9 IN ADULT (HCC): Status: ACTIVE | Noted: 2020-03-17

## 2023-01-16 PROBLEM — Z79.82 LONG TERM (CURRENT) USE OF ASPIRIN: Status: ACTIVE | Noted: 2022-12-14

## 2023-01-25 PROBLEM — G95.19 EDEMA OF SPINAL CORD (HCC): Status: RESOLVED | Noted: 2020-08-04 | Resolved: 2023-01-25

## 2023-01-25 PROBLEM — E66.813 CLASS 3 SEVERE OBESITY WITH SERIOUS COMORBIDITY AND BODY MASS INDEX (BMI) OF 40.0 TO 44.9 IN ADULT (HCC): Status: RESOLVED | Noted: 2020-03-17 | Resolved: 2023-01-25

## 2023-01-25 PROBLEM — Q04.8 CEREBELLAR TONSILLAR ECTOPIA (HCC): Status: RESOLVED | Noted: 2020-08-04 | Resolved: 2023-01-25

## 2023-01-25 PROBLEM — G95.9 CERVICAL MYELOPATHY (HCC): Status: RESOLVED | Noted: 2020-09-14 | Resolved: 2023-01-25

## 2023-01-25 PROBLEM — E66.01 CLASS 3 SEVERE OBESITY WITH SERIOUS COMORBIDITY AND BODY MASS INDEX (BMI) OF 40.0 TO 44.9 IN ADULT (HCC): Status: RESOLVED | Noted: 2020-03-17 | Resolved: 2023-01-25

## 2023-01-29 ENCOUNTER — HOSPITAL ENCOUNTER (EMERGENCY)
Facility: HOSPITAL | Age: 53
Discharge: OTHER INSTITUTION | End: 2023-01-29
Attending: EMERGENCY MEDICINE

## 2023-01-29 ENCOUNTER — APPOINTMENT (OUTPATIENT)
Dept: CT IMAGING | Facility: HOSPITAL | Age: 53
End: 2023-01-29

## 2023-01-29 VITALS
BODY MASS INDEX: 41.98 KG/M2 | TEMPERATURE: 99 F | HEART RATE: 113 BPM | RESPIRATION RATE: 18 BRPM | DIASTOLIC BLOOD PRESSURE: 87 MMHG | HEIGHT: 68 IN | WEIGHT: 277 LBS | OXYGEN SATURATION: 95 % | SYSTOLIC BLOOD PRESSURE: 141 MMHG

## 2023-01-29 DIAGNOSIS — Z51.89 VISIT FOR WOUND CHECK: Primary | ICD-10-CM

## 2023-01-29 DIAGNOSIS — R51.9 HEADACHE: ICD-10-CM

## 2023-01-29 LAB
ALBUMIN SERPL BCP-MCNC: 3.7 G/DL (ref 3.5–5)
ALP SERPL-CCNC: 78 U/L (ref 46–116)
ALT SERPL W P-5'-P-CCNC: 55 U/L (ref 12–78)
ANION GAP SERPL CALCULATED.3IONS-SCNC: 11 MMOL/L (ref 4–13)
AST SERPL W P-5'-P-CCNC: 22 U/L (ref 5–45)
BASOPHILS # BLD AUTO: 0.07 THOUSANDS/ÂΜL (ref 0–0.1)
BASOPHILS NFR BLD AUTO: 1 % (ref 0–1)
BILIRUB SERPL-MCNC: 0.4 MG/DL (ref 0.2–1)
BUN SERPL-MCNC: 9 MG/DL (ref 5–25)
CALCIUM SERPL-MCNC: 8.6 MG/DL (ref 8.3–10.1)
CHLORIDE SERPL-SCNC: 104 MMOL/L (ref 96–108)
CO2 SERPL-SCNC: 26 MMOL/L (ref 21–32)
CREAT SERPL-MCNC: 0.95 MG/DL (ref 0.6–1.3)
EOSINOPHIL # BLD AUTO: 0.13 THOUSAND/ÂΜL (ref 0–0.61)
EOSINOPHIL NFR BLD AUTO: 3 % (ref 0–6)
ERYTHROCYTE [DISTWIDTH] IN BLOOD BY AUTOMATED COUNT: 11.7 % (ref 11.6–15.1)
GFR SERPL CREATININE-BSD FRML MDRD: 92 ML/MIN/1.73SQ M
GLUCOSE SERPL-MCNC: 94 MG/DL (ref 65–140)
HCT VFR BLD AUTO: 42.8 % (ref 36.5–49.3)
HGB BLD-MCNC: 14.4 G/DL (ref 12–17)
IMM GRANULOCYTES # BLD AUTO: 0.01 THOUSAND/UL (ref 0–0.2)
IMM GRANULOCYTES NFR BLD AUTO: 0 % (ref 0–2)
LYMPHOCYTES # BLD AUTO: 1.09 THOUSANDS/ÂΜL (ref 0.6–4.47)
LYMPHOCYTES NFR BLD AUTO: 21 % (ref 14–44)
MCH RBC QN AUTO: 30.9 PG (ref 26.8–34.3)
MCHC RBC AUTO-ENTMCNC: 33.6 G/DL (ref 31.4–37.4)
MCV RBC AUTO: 92 FL (ref 82–98)
MONOCYTES # BLD AUTO: 0.69 THOUSAND/ÂΜL (ref 0.17–1.22)
MONOCYTES NFR BLD AUTO: 14 % (ref 4–12)
NEUTROPHILS # BLD AUTO: 3.13 THOUSANDS/ÂΜL (ref 1.85–7.62)
NEUTS SEG NFR BLD AUTO: 61 % (ref 43–75)
NRBC BLD AUTO-RTO: 0 /100 WBCS
PLATELET # BLD AUTO: 242 THOUSANDS/UL (ref 149–390)
PMV BLD AUTO: 9 FL (ref 8.9–12.7)
POTASSIUM SERPL-SCNC: 4.2 MMOL/L (ref 3.5–5.3)
PROT SERPL-MCNC: 6.9 G/DL (ref 6.4–8.4)
RBC # BLD AUTO: 4.66 MILLION/UL (ref 3.88–5.62)
SODIUM SERPL-SCNC: 141 MMOL/L (ref 135–147)
WBC # BLD AUTO: 5.12 THOUSAND/UL (ref 4.31–10.16)

## 2023-01-29 RX ORDER — ACETAMINOPHEN 325 MG/1
650 TABLET ORAL ONCE
Status: COMPLETED | OUTPATIENT
Start: 2023-01-29 | End: 2023-01-29

## 2023-01-29 RX ADMIN — ACETAMINOPHEN 650 MG: 325 TABLET, FILM COATED ORAL at 19:21

## 2023-01-29 NOTE — ED NOTES
Pt reported having Craniectomy suboccipital at [facility] on 01/03  Two weeks ago pt had stitches remove and today around 3pm the wound started draining  Scant yellowish discharge        [address] RN  01/29/23 1198

## 2023-01-29 NOTE — EMTALA/ACUTE CARE TRANSFER
[facility] EMERGENCY DEPARTMENT  [address]  [place] [facility] [address]  Dept: [phone]      KQZBZO TRANSFER CONSENT    NAME Awais Fitzpatrick                                         1970                              MRN [id number]    I have been informed of my rights regarding examination, treatment, and transfer   by Dr Terry Santana MD    Benefits: Specialized equipment and/or services available at the receiving facility (Include comment)________________________    Risks: Potential for delay in receiving treatment      Transfer Request   I acknowledge that my medical condition has been evaluated and explained to me by the emergency department physician or other qualified medical person and/or my attending physician who has recommended and offered to me further medical examination and treatment  I understand the Hospital's obligation with respect to the treatment and stabilization of my emergency medical condition  I nevertheless request to be transferred  I release the Hospital, the doctor, and any other persons caring for me from all responsibility or liability for any injury or ill effects that may result from my transfer and agree to accept all responsibility for the consequences of my choice to transfer, rather than receive stabilizing treatment at the Hospital  I understand that because the transfer is my request, my insurance may not provide reimbursement for the services  The Hospital will assist and direct me and my family in how to make arrangements for transfer, but the hospital is not liable for any fees charged by the transport service  In spite of this understanding, I refuse to consent to further medical examination and treatment which has been offered to me, and request transfer to  [place]  Name, [facility] State : Eliezer Reed   I authorize the performance of emergency medical procedures and treatments upon me in both transit and upon arrival at the receiving facility  Additionally, I authorize the release of any and all medical records to the receiving facility and request they be transported with me, if possible  I authorize the performance of emergency medical procedures and treatments upon me in both transit and upon arrival at the receiving facility  Additionally, I authorize the release of any and all medical records to the receiving facility and request they be transported with me, if possible  I understand that the safest mode of transportation during a medical emergency is an ambulance and that the Hospital advocates the use of this mode of transport  Risks of traveling to the receiving facility by car, including absence of medical control, life sustaining equipment, such as oxygen, and medical personnel has been explained to me and I fully understand them  (JACKSON CORRECT BOX BELOW)  [  ]  I consent to the stated transfer and to be transported by ambulance/helicopter  [  ]  I consent to the stated transfer, but refuse transportation by ambulance and accept full responsibility for my transportation by car  I understand the risks of non-ambulance transfers and I exonerate the Hospital and its staff from any deterioration in my condition that results from this refusal     X___________________________________________    DATE  23  TIME________  Signature of patient or legally responsible individual signing on patient behalf           RELATIONSHIP TO PATIENT_________________________          Provider Certification    NAME Leonard Castillo                                        [facility] 1970                              MRN [id number]    A medical screening exam was performed on the above named patient  Based on the examination:    Condition Necessitating Transfer There were no encounter diagnoses      Patient Condition: The patient has been stabilized such that within reasonable medical probability, no material deterioration of the patient condition or the condition of the unborn child(kay) is likely to result from the transfer    Reason for Transfer: Level of Care needed not available at this facility    Transfer Requirements: [address]   · Space available and qualified personnel available for treatment as acknowledged by    · Agreed to accept transfer and to provide appropriate medical treatment as acknowledged by       Ruthy Bhatt  · Appropriate medical records of the examination and treatment of the patient are provided at the time of transfer   [address] [facility], [address] _______  · Transfer will be performed by qualified personnel from    and appropriate transfer equipment as required, including the use of necessary and appropriate life support measures  Provider Certification: I have examined the patient and explained the following risks and benefits of being transferred/refusing transfer to the patient/family:  General risk, such as traffic hazards, adverse weather conditions, rough terrain or turbulence, possible failure of equipment (including vehicle or aircraft), or consequences of actions of persons outside the control of the transport personnel, Unanticipated needs of medical equipment and personnel during transport, Risk of worsening condition, The possibility of a transport vehicle being unavailable      Based on these reasonable risks and benefits to the patient and/or the unborn child(kay), and based upon the information available at the time of the patient’s examination, I certify that the medical benefits reasonably to be expected from the provision of appropriate medical treatments at another medical facility outweigh the increasing risks, if any, to the individual’s medical condition, and in the case of labor to the unborn child, from effecting the transfer      X____________________________________________ DATE 01/29/23        TIME_______      ORIGINAL - SEND TO MEDICAL RECORDS   COPY - SEND WITH PATIENT DURING TRANSFER

## 2023-01-29 NOTE — ED PROVIDER NOTES
History  Chief Complaint   Patient presents with   • Wound Check     Patient presents to the ED with c/o drainage from incision, states he has clear drainage leaking from back of head, states he had to have the base of skull enlarged on 1/3/23      HPI  Patient is a 53-year-old male past medical history of HTN, Chiari malformation presenting today with clear drainage from incision  Underwent a suboccipital craniectomy on 1/3/2023 at [facility] in [place]  Reports initially was doing well has had some headaches and worsening vision since initial surgery  Reports that approximately 3 to 4 hours ago began to notice clear fluid running down his back  Reports that since then he has had clear fluid coming from his wound on the upper portion continuously  He also reports a slightly worsening headache that is worse with noise and standing  He denies any fevers or chills denies any new vision or hearing changes numbness or weakness  Has otherwise been in his usual state of health  Prior to Admission Medications   Prescriptions Last Dose Informant Patient Reported? Taking?    Cyanocobalamin 1000 MCG SUBL   No No   Sig: Place 1 tablet (1,000 mcg total) under the tongue daily   aspirin 81 mg chewable tablet   Yes No   Sig: Chew 81 mg daily   losartan (Cozaar) 50 mg tablet   No No   Sig: Take 1 tablet (50 mg total) by mouth daily   mometasone (ELOCON) 0 1 % cream   No No   Sig: Apply topically daily   Patient taking differently: Apply topically as needed   multivitamin (THERAGRAN) TABS   Yes No   Sig: Take 1 tablet by mouth daily      Facility-Administered Medications Last Administration Doses Remaining   cyanocobalamin injection 1,000 mcg 9/14/2020  8:41 AM           Past Medical History:   Diagnosis Date   • Head ache    • Hypertension        Past Surgical History:   Procedure Laterality Date   • CARPAL TUNNEL RELEASE Left    • CRANIECTOMY SUBOCCIPITAL W/ CERVICAL LAMINECTOMY / CHIARI         Family History Problem Relation Age of Onset   • No Known Problems Mother    • No Known Problems Father    • Cancer Maternal Grandmother    • Cancer Maternal Grandfather    • Substance Abuse Brother    • Substance Abuse Brother    • Alcohol abuse Neg Hx    • Mental illness Neg Hx      I have reviewed and agree with the history as documented  E-Cigarette/Vaping   • E-Cigarette Use Never User      E-Cigarette/Vaping Substances     Social History     Tobacco Use   • Smoking status: Every Day     Packs/day: 0 50     Types: Cigarettes   • Smokeless tobacco: Never   Vaping Use   • Vaping Use: Never used   Substance Use Topics   • Alcohol use: Not Currently     Comment: Everyday    • Drug use: No       Review of Systems   Constitutional: Negative for chills and fever  HENT: Negative for congestion, rhinorrhea and sore throat  Eyes: Positive for visual disturbance (blurred vision increased from baseline, not changed today)  Negative for redness  Respiratory: Negative for cough and shortness of breath  Cardiovascular: Negative for chest pain and palpitations  Gastrointestinal: Negative for constipation, diarrhea, nausea and vomiting  Genitourinary: Negative for dysuria and hematuria  Musculoskeletal: Negative for myalgias and neck pain  Skin: Positive for wound  Negative for rash  Allergic/Immunologic: Negative for immunocompromised state  Neurological: Positive for headaches  Negative for seizures and syncope  Psychiatric/Behavioral: Negative for confusion and suicidal ideas  Physical Exam  Physical Exam  Vitals and nursing note reviewed  Constitutional:       General: He is not in acute distress  Appearance: Normal appearance  He is well-developed  HENT:      Head: Normocephalic and atraumatic  No raccoon eyes  Comments: Midline well-healing scar, upper portion with small amount of clear drainage around scab formation, bandages are damp with clear fluid        Right Ear: External ear normal  Left Ear: External ear normal       Nose: Nose normal  No congestion  Mouth/Throat:      Lips: Pink  Mouth: Mucous membranes are moist    Eyes:      General: Lids are normal  No scleral icterus  Extraocular Movements: Extraocular movements intact  Cardiovascular:      Rate and Rhythm: Normal rate and regular rhythm  Heart sounds: No murmur heard  No friction rub  Pulmonary:      Effort: Pulmonary effort is normal  No respiratory distress  Breath sounds: No wheezing or rhonchi  Abdominal:      General: Abdomen is flat  Palpations: Abdomen is soft  Tenderness: There is no abdominal tenderness  There is no guarding or rebound  Musculoskeletal:      Cervical back: Normal range of motion  No torticollis  Skin:     General: Skin is warm and dry  Coloration: Skin is not jaundiced  Findings: No rash  Neurological:      Mental Status: He is alert and oriented to person, place, and time  Mental status is at baseline  GCS: GCS eye subscore is 4  GCS verbal subscore is 5  GCS motor subscore is 6  Cranial Nerves: Cranial nerves 2-12 are intact  No dysarthria or facial asymmetry  Sensory: Sensation is intact  No sensory deficit  Motor: Motor function is intact  No weakness  Coordination: Coordination is intact  Finger-Nose-Finger Test normal       Gait: Gait is intact  Psychiatric:         Behavior: Behavior normal  Behavior is cooperative           Vital Signs  ED Triage Vitals   Temperature Pulse Respirations Blood Pressure SpO2   01/29/23 1459 01/29/23 1457 01/29/23 1457 01/29/23 1457 01/29/23 1457   99 °F (37 2 °C) (!) 111 18 139/78 97 %      Temp Source Heart Rate Source Patient Position - Orthostatic VS BP Location FiO2 (%)   01/29/23 1459 01/29/23 1457 01/29/23 1457 01/29/23 1457 --   Temporal Monitor Sitting Left arm       Pain Score       01/29/23 1457       5           Vitals:    01/29/23 1457   BP: 139/78   Pulse: (!) 111 Patient Position - Orthostatic VS: Sitting         Visual Acuity      ED Medications  Medications - No data to display    Diagnostic Studies  Results Reviewed     Procedure Component Value Units Date/Time    Comprehensive metabolic panel [[id number]] Collected: 01/29/23 1516    Lab Status: Final result Specimen: Blood from Arm, Right Updated: 01/29/23 1541     Sodium 141 mmol/L      Potassium 4 2 mmol/L      Chloride 104 mmol/L      CO2 26 mmol/L      ANION GAP 11 mmol/L      BUN 9 mg/dL      Creatinine 0 95 mg/dL      Glucose 94 mg/dL      Calcium 8 6 mg/dL      AST 22 U/L      ALT 55 U/L      Alkaline Phosphatase 78 U/L      Total Protein 6 9 g/dL      Albumin 3 7 g/dL      Total Bilirubin 0 40 mg/dL      eGFR 92 ml/min/1 73sq m     Narrative:      [place] guidelines for Chronic Kidney Disease (CKD):   •  Stage 1 with normal or high GFR (GFR > 90 mL/min/1 73 square meters)  •  Stage 2 Mild CKD (GFR = 60-89 mL/min/1 73 square meters)  •  Stage 3A Moderate CKD (GFR = 45-59 mL/min/1 73 square meters)  •  Stage 3B Moderate CKD (GFR = 30-44 mL/min/1 73 square meters)  •  Stage 4 Severe CKD (GFR = 15-29 mL/min/1 73 square meters)  •  Stage 5 End Stage CKD (GFR <15 mL/min/1 73 square meters)  Note: GFR calculation is accurate only with a steady state creatinine    CBC and differential [[id number]]  (Abnormal) Collected: 01/29/23 1516    Lab Status: Final result Specimen: Blood from Arm, Right Updated: 01/29/23 1522     WBC 5 12 Thousand/uL      RBC 4 66 Million/uL      Hemoglobin 14 4 g/dL      Hematocrit 42 8 %      MCV 92 fL      MCH 30 9 pg      MCHC 33 6 g/dL      RDW 11 7 %      MPV 9 0 fL      Platelets 923 Thousands/uL      nRBC 0 /100 WBCs      Neutrophils Relative 61 %      Immat GRANS % 0 %      Lymphocytes Relative 21 %      Monocytes Relative 14 %      Eosinophils Relative 3 %      Basophils Relative 1 %      Neutrophils Absolute 3 13 Thousands/µL      Immature Grans Absolute 0 01 Thousand/uL      Lymphocytes Absolute 1 09 Thousands/µL      Monocytes Absolute 0 69 Thousand/µL      Eosinophils Absolute 0 13 Thousand/µL      Basophils Absolute 0 07 Thousands/µL                  CT head without contrast   Final Result by Pedro Luis Ayon MD (01/29 1659)      Postsurgical changes status post suboccipital craniectomy  Otherwise, no acute intracranial abnormality  Workstation performed: [id number]                    Procedures  Procedures         ED Course     Patient transferred to [facility] for primary neurosurgery team evaluation  Neuro status remains stable through time in ED  Received Tylenol for headache  MDM   Patient on arrival is ambulatory to room is in no acute distress, vital signs stable, afebrile  On exam lungs clear auscultation, heart without murmurs rubs or gallops abdomen soft nontender  No focal neuro deficits, clear drainage from upper portion of wound around scabbing, bandages damp with clear fluids  CT with post-surgical changes otherwise no acute abnormalities  Labs reassuring  Concern for CSF leak will discuss with primary NSGY team    Disposition  Final diagnoses:   None     ED Disposition     None      Follow-up Information    None         Patient's Medications   Discharge Prescriptions    No medications on file       No discharge procedures on file      PDMP Review     None          ED Provider  Electronically Signed by           Oscar Godfrey MD  01/29/23 5371

## 2023-01-30 NOTE — ED NOTES
[facility] and gave report  No further questions        [address] RN  01/29/23 0092

## 2023-03-03 ENCOUNTER — OFFICE VISIT (OUTPATIENT)
Dept: FAMILY MEDICINE CLINIC | Facility: CLINIC | Age: 53
End: 2023-03-03

## 2023-03-03 VITALS
DIASTOLIC BLOOD PRESSURE: 80 MMHG | BODY MASS INDEX: 41.22 KG/M2 | WEIGHT: 272 LBS | SYSTOLIC BLOOD PRESSURE: 120 MMHG | HEART RATE: 82 BPM | RESPIRATION RATE: 18 BRPM | OXYGEN SATURATION: 98 % | HEIGHT: 68 IN

## 2023-03-03 DIAGNOSIS — F43.23 SITUATIONAL MIXED ANXIETY AND DEPRESSIVE DISORDER: Primary | ICD-10-CM

## 2023-03-03 RX ORDER — OXYCODONE HYDROCHLORIDE AND ACETAMINOPHEN 5; 325 MG/1; MG/1
TABLET ORAL
COMMUNITY
Start: 2023-03-01

## 2023-03-03 RX ORDER — OXYCODONE HYDROCHLORIDE 10 MG/1
10 TABLET ORAL
COMMUNITY
Start: 2023-02-22

## 2023-03-03 RX ORDER — HYDROXYZINE HYDROCHLORIDE 25 MG/1
25 TABLET, FILM COATED ORAL
Qty: 30 TABLET | Refills: 0 | Status: SHIPPED | OUTPATIENT
Start: 2023-03-03

## 2023-03-03 NOTE — PROGRESS NOTES
Assessment/Plan:    1  Reactive anxiety - medical anxiety from previous surgery and complications, will start Zoloft 50 mg once daily and try hydroxyzine at night to sleep  F/u 4 weeks or sooner if needed    Subjective:   Chief Complaint   Patient presents with   • Depression     Following surgery  Anxiety over possibly going back to the hospital  Interfering with sleep      Patient ID: Blake Chairez is a 50 y o  male  Patient here in follow up  Had Suboccipital craniotomy for decompression for chiari malformation at [facility] back on 1/3/2023, has been readmitted twice now for CSF leak, common post op complication  Patient has been very anxious since last discharge, not sleeping, hypervigilant about a new leak, very focused on health, recovery  Daughter here with patient  Concerned about him not sleeping         The following portions of the patient's history were reviewed and updated as appropriate: allergies, current medications, past family history, past medical history, past social history, past surgical history and problem list     Past Medical History:   Diagnosis Date   • Head ache    • Hypertension      Past Surgical History:   Procedure Laterality Date   • CARPAL TUNNEL RELEASE Left    • CRANIECTOMY SUBOCCIPITAL W/ CERVICAL LAMINECTOMY / CHIARI       Family History   Problem Relation Age of Onset   • No Known Problems Mother    • No Known Problems Father    • Cancer Maternal Grandmother    • Cancer Maternal Grandfather    • Substance Abuse Brother    • Substance Abuse Brother    • Alcohol abuse Neg Hx    • Mental illness Neg Hx      Social History     Socioeconomic History   • Marital status: Single     Spouse name: Not on file   • Number of children: Not on file   • Years of education: Not on file   • Highest education level: Not on file   Occupational History   • Not on file   Tobacco Use   • Smoking status: Every Day     Packs/day: 0 50     Types: Cigarettes   • Smokeless tobacco: Never Vaping Use   • Vaping Use: Never used   Substance and Sexual Activity   • Alcohol use: Not Currently     Comment: Everyday    • Drug use: No   • Sexual activity: Yes     Partners: Female   Other Topics Concern   • Not on file   Social History Narrative   • Not on file     Social Determinants of Health     Financial Resource Strain: Not on file   Food Insecurity: Not on file   Transportation Needs: Not on file   Physical Activity: Not on file   Stress: Not on file   Social Connections: Not on file   Intimate Partner Violence: Not on file   Housing Stability: Not on file       Current Outpatient Medications:   •  aspirin 81 mg chewable tablet, Chew 81 mg daily, Disp: , Rfl:   •  Cyanocobalamin 1000 MCG SUBL, Place 1 tablet (1,000 mcg total) under the tongue daily, Disp: 90 tablet, Rfl: 0  •  losartan (Cozaar) 50 mg tablet, Take 1 tablet (50 mg total) by mouth daily, Disp: 90 tablet, Rfl: 3  •  mometasone (ELOCON) 0 1 % cream, Apply topically daily (Patient taking differently: Apply topically as needed), Disp: 45 g, Rfl: 3  •  multivitamin (THERAGRAN) TABS, Take 1 tablet by mouth daily, Disp: , Rfl:   •  oxyCODONE (ROXICODONE) 10 MG TABS, Take 10 mg by mouth, Disp: , Rfl:   •  oxyCODONE-acetaminophen (PERCOCET) 5-325 mg per tablet, , Disp: , Rfl:     Current Facility-Administered Medications:   •  cyanocobalamin injection 1,000 mcg, 1,000 mcg, Intramuscular, Q30 Days, Ida Mendiola MD, 1,000 mcg at 09/14/20 0841    Review of Systems          Objective:    Vitals:    03/03/23 1119   BP: 120/80   BP Location: Left arm   Patient Position: Sitting   Cuff Size: Large   Pulse: 82   Resp: 18   SpO2: 98%   Weight: 123 kg (272 lb)   Height: 5' 8" (1 727 m)        Physical Exam  Constitutional:       Appearance: Normal appearance  He is obese  HENT:      Head: Normocephalic and atraumatic  Neurological:      Mental Status: He is alert     Psychiatric:         Attention and Perception: Attention normal          Mood and Affect: Mood is anxious  Mood is not depressed  Affect is not flat, angry, tearful or inappropriate  Speech: Speech normal          Behavior: Behavior normal  Behavior is cooperative  Thought Content: Thought content normal          Cognition and Memory: Cognition and memory normal          Judgment: Judgment normal            I have spent a total time of 32 minutes on 03/09/23 in caring for this patient including Risks and benefits of tx options, Instructions for management, Patient and family education, Importance of tx compliance, Risk factor reductions and Impressions

## 2023-03-09 PROBLEM — G96.00 CSF LEAK: Status: ACTIVE | Noted: 2023-01-29

## 2023-03-09 PROBLEM — T88.8XXA FLUID COLLECTION AT SURGICAL SITE: Status: ACTIVE | Noted: 2023-01-30

## 2023-03-24 DIAGNOSIS — F43.23 SITUATIONAL MIXED ANXIETY AND DEPRESSIVE DISORDER: ICD-10-CM

## 2023-03-25 RX ORDER — HYDROXYZINE HYDROCHLORIDE 25 MG/1
TABLET, FILM COATED ORAL
Qty: 30 TABLET | Refills: 0 | Status: SHIPPED | OUTPATIENT
Start: 2023-03-25

## 2023-03-31 ENCOUNTER — TRANSITIONAL CARE MANAGEMENT (OUTPATIENT)
Dept: FAMILY MEDICINE CLINIC | Facility: CLINIC | Age: 53
End: 2023-03-31

## 2023-04-03 ENCOUNTER — OFFICE VISIT (OUTPATIENT)
Dept: FAMILY MEDICINE CLINIC | Facility: CLINIC | Age: 53
End: 2023-04-03

## 2023-04-03 VITALS
HEIGHT: 68 IN | RESPIRATION RATE: 16 BRPM | OXYGEN SATURATION: 94 % | BODY MASS INDEX: 41.07 KG/M2 | SYSTOLIC BLOOD PRESSURE: 102 MMHG | WEIGHT: 271 LBS | DIASTOLIC BLOOD PRESSURE: 70 MMHG | HEART RATE: 87 BPM

## 2023-04-03 DIAGNOSIS — I10 ESSENTIAL HYPERTENSION: ICD-10-CM

## 2023-04-03 DIAGNOSIS — G47.33 OSA (OBSTRUCTIVE SLEEP APNEA): ICD-10-CM

## 2023-04-03 DIAGNOSIS — E66.01 MORBID OBESITY WITH BMI OF 40.0-44.9, ADULT (HCC): ICD-10-CM

## 2023-04-03 DIAGNOSIS — G96.00 CSF LEAK: ICD-10-CM

## 2023-04-03 DIAGNOSIS — F43.23 SITUATIONAL MIXED ANXIETY AND DEPRESSIVE DISORDER: ICD-10-CM

## 2023-04-03 DIAGNOSIS — G93.5 CHIARI MALFORMATION TYPE I (HCC): Primary | ICD-10-CM

## 2023-04-03 RX ORDER — ACETAMINOPHEN 325 MG/1
650 TABLET ORAL
COMMUNITY
Start: 2023-03-30 | End: 2023-04-09

## 2023-04-03 NOTE — PROGRESS NOTES
Assessment & Plan     1  Chiari malformation - s/p correction, complicated by CSF leak, ultimately corrected with  shunt  Patient noting improvement    2  CSF leak - resolved with  shunt    3  NASIM - refer to Dr Alex Benites to review sleep study and start CPAP    4  HTN - well controlled on losartan 50 mg, while inpatient was on 100 mg and BP dropped too low per patient, will continue at 50 mg for now    5  Situational anxiety - well controlled on zoloft 50 mg once daily    6  Morbid obesity - will need to work on this once cleared by surgeon for activity, for now focus on healthy diet    F/u as needed       Subjective     Transitional Care Management Review:   Rula Staff is a 50 y o  male here for TCM follow up  During the TCM phone call patient stated:  TCM Call     Date and time call was made  3/31/2023  2:47 PM    Hospital care reviewed  Records reviewed    Patient was hospitialized at  Other (comment)    Comment  [facility]    Date of Admission  03/27/23    Date of discharge  03/30/21    Diagnosis  CSF leak    Disposition  Home      TCM Call     Patients specialists  Other (comment)    Other specialists names  neurosurgery    I have advised the patient to call PCP with any new or worsening symptoms  Mason Owusu, you were in the hospital The patient is a 50 y o  male Hx Chiari Type 1 malformation s/p decompression 1/3/23 c/b CSF leak so underwent revision and lumbar drain placement 1/30/23 c/b further leak s/p IR lumbar drain 2/10/23  Was readmitted again 3/13 due to leakage and underwent  shunt placement 3/14/23  Presenting again with CSF leak  Patient states that he started experiencing the leak on Friday  He leaks only at night when he is sleeping and laying down  He wakes up and there's a pool of dry clear fluid under him  Also endorses HA  Denies vision changes, muscle weakness  On 3/28/23 shunt dialed to 0 5  Incision monitored for drainage   Incision remained "dry  No surgical intervention  Continue antibiotic for 5 more days    Patient here for follow up  No leakage  Wakes with headache and takes 2 percocet in the morning, 1 every 6 hours  Headache is improving! On keflex to repvent infection, no complaints, no fever, chills  Follow up with Dr Adalgisa Ricketts, 4/11/2023, surgeon  During admission again nurses urged him to get evaluated for NASIM, oxygen dropped at night per nurse  Patient had sleep study done 2 years ago dx with severe sleep apnea, never followed up  Zoloft working well for anxiety  Would like to continue  Review of Systems    Objective     /70 (BP Location: Left arm, Patient Position: Sitting, Cuff Size: Large)   Pulse 87   Resp 16   Ht 5' 8\" (1 727 m)   Wt 123 kg (271 lb)   SpO2 94%   BMI 41 21 kg/m²      Physical Exam  Constitutional:       Appearance: Normal appearance  He is obese  HENT:      Head: Normocephalic and atraumatic  Cardiovascular:      Rate and Rhythm: Normal rate and regular rhythm  Pulses: Normal pulses  Heart sounds: Normal heart sounds  Pulmonary:      Effort: Pulmonary effort is normal       Breath sounds: Normal breath sounds  Musculoskeletal:      Cervical back: Normal range of motion and neck supple  Skin:     General: Skin is warm  Comments: Surgical wounds healing without sign infection   Neurological:      General: No focal deficit present  Mental Status: He is alert and oriented to person, place, and time  Psychiatric:         Mood and Affect: Mood normal          Behavior: Behavior normal          Thought Content:  Thought content normal          Judgment: Judgment normal        Medications have been reviewed by provider in current encounter    Kianna Szymanski PA-C         "

## 2023-04-04 PROBLEM — F43.23 SITUATIONAL MIXED ANXIETY AND DEPRESSIVE DISORDER: Status: ACTIVE | Noted: 2023-04-04

## 2023-04-04 PROBLEM — R55 NEAR SYNCOPE: Status: RESOLVED | Noted: 2020-07-17 | Resolved: 2023-04-04

## 2023-04-26 DIAGNOSIS — F43.23 SITUATIONAL MIXED ANXIETY AND DEPRESSIVE DISORDER: ICD-10-CM

## 2023-04-26 RX ORDER — HYDROXYZINE HYDROCHLORIDE 25 MG/1
TABLET, FILM COATED ORAL
Qty: 30 TABLET | Refills: 0 | Status: SHIPPED | OUTPATIENT
Start: 2023-04-26

## 2023-08-08 ENCOUNTER — TELEPHONE (OUTPATIENT)
Dept: SLEEP CENTER | Facility: HOSPITAL | Age: 53
End: 2023-08-08

## 2023-09-26 ENCOUNTER — TRANSITIONAL CARE MANAGEMENT (OUTPATIENT)
Dept: FAMILY MEDICINE CLINIC | Facility: CLINIC | Age: 53
End: 2023-09-26

## 2023-09-27 ENCOUNTER — OFFICE VISIT (OUTPATIENT)
Dept: FAMILY MEDICINE CLINIC | Facility: CLINIC | Age: 53
End: 2023-09-27
Payer: COMMERCIAL

## 2023-09-27 VITALS
HEIGHT: 68 IN | DIASTOLIC BLOOD PRESSURE: 62 MMHG | BODY MASS INDEX: 39.21 KG/M2 | SYSTOLIC BLOOD PRESSURE: 124 MMHG | RESPIRATION RATE: 16 BRPM | WEIGHT: 258.7 LBS | TEMPERATURE: 97.8 F

## 2023-09-27 DIAGNOSIS — I10 ESSENTIAL HYPERTENSION: ICD-10-CM

## 2023-09-27 DIAGNOSIS — G00.8: ICD-10-CM

## 2023-09-27 DIAGNOSIS — E66.01 MORBID OBESITY WITH BMI OF 40.0-44.9, ADULT (HCC): ICD-10-CM

## 2023-09-27 DIAGNOSIS — I63.9 CEREBROVASCULAR ACCIDENT (CVA), UNSPECIFIED MECHANISM (HCC): ICD-10-CM

## 2023-09-27 DIAGNOSIS — G93.5 CHIARI MALFORMATION TYPE I (HCC): ICD-10-CM

## 2023-09-27 DIAGNOSIS — I63.9 ACUTE ISCHEMIC STROKE (HCC): Primary | ICD-10-CM

## 2023-09-27 DIAGNOSIS — B96.89 VENTRICULITIS OF BRAIN DUE TO BACTERIA: ICD-10-CM

## 2023-09-27 DIAGNOSIS — G91.0 COMMUNICATING HYDROCEPHALUS (HCC): ICD-10-CM

## 2023-09-27 DIAGNOSIS — G04.90 VENTRICULITIS OF BRAIN DUE TO BACTERIA: ICD-10-CM

## 2023-09-27 PROBLEM — G96.198 PSEUDOMENINGOCELE: Status: ACTIVE | Noted: 2023-05-01

## 2023-09-27 PROBLEM — T81.30XA WOUND DEHISCENCE: Status: ACTIVE | Noted: 2023-06-26

## 2023-09-27 PROCEDURE — 99495 TRANSJ CARE MGMT MOD F2F 14D: CPT | Performed by: PHYSICIAN ASSISTANT

## 2023-09-27 RX ORDER — ACETAMINOPHEN 500 MG
500 TABLET ORAL EVERY 4 HOURS PRN
COMMUNITY
Start: 2023-09-22

## 2023-09-27 RX ORDER — METHOCARBAMOL 500 MG/1
500 TABLET, FILM COATED ORAL 4 TIMES DAILY PRN
COMMUNITY
Start: 2023-09-22

## 2023-09-27 RX ORDER — AMLODIPINE BESYLATE 10 MG/1
10 TABLET ORAL DAILY
COMMUNITY
Start: 2023-09-22

## 2023-09-27 RX ORDER — POLYETHYLENE GLYCOL 3350
17 POWDER (GRAM) MISCELLANEOUS DAILY
COMMUNITY
Start: 2023-06-19

## 2023-09-27 RX ORDER — ASPIRIN 81 MG/1
81 TABLET ORAL DAILY
COMMUNITY
Start: 2023-08-29 | End: 2023-09-28

## 2023-09-27 RX ORDER — ATORVASTATIN CALCIUM 40 MG/1
TABLET, FILM COATED ORAL
COMMUNITY
Start: 2023-09-22

## 2023-09-27 RX ORDER — LOSARTAN POTASSIUM 25 MG/1
25 TABLET ORAL DAILY
Qty: 90 TABLET | Refills: 1 | Status: SHIPPED | OUTPATIENT
Start: 2023-09-27

## 2023-09-27 RX ORDER — DOXAZOSIN MESYLATE 4 MG/1
TABLET ORAL
COMMUNITY
Start: 2023-09-22

## 2023-09-27 RX ORDER — FERROUS SULFATE 325(65) MG
1 TABLET ORAL EVERY OTHER DAY
COMMUNITY
Start: 2023-09-22

## 2023-09-27 RX ORDER — OXYCODONE HYDROCHLORIDE 5 MG/1
TABLET ORAL
COMMUNITY
Start: 2023-07-07

## 2023-09-27 NOTE — PROGRESS NOTES
Assessment & Plan    1. History of Chiari malformation, pseudomeningocele and hydrocephalus  -Status post  shunt in 6/9839, complicated by incisional drainage and CSF leak s/p repair on 6/16/2023 and revision of VPS to lumbar peritoneal shunt 6/27/2023   -Shunt removal on 7/11 due to infection. S/p thoracic and lumbar washout with lumbar durotomy repair  -Lumboperitoneal shunt last placed on 8/17 with revision on 8/18  -Outpatient follow-up with neurosurgery, daughter will call and schedule appointment    2. Staph epi ventriculitis/meningitis, resolved     3 Klebsiella bacteremia, resolved  -Completed full course of IV meropenem with last dose on 8/25  -ALDO was negative for endocarditis    4. Decreased hearing on the left side, resolved   -Received short course of Ciprodex with improvement in symptoms    5. Acute ischemic stroke affecting bilateral hemispheres on 7/26 MRI  - resulted in left sided weakness. Left side strength has improved during his stay at [facility]. Continues to have dorsiflexion weakness with decreased foot clearance. Could possibly benefit from AFO as outpatient if dorsiflexion strength does not improve  -Stroke was thought to be from vasculitis in the setting of meningitis  -Continue aspirin and Lipitor for secondary stroke prevention, will manage here, check lipids in 3 months after starting with CMP, OT, PT referrals in place. Would like patient to see neuro up here in [facility] for continued care    6. Branch retinal vein occlusion  -No urgent intervention. Evaluated by ophthalmology in acute care. -Patient prefers to follow-up with [facility] ophthalmology. Referral placed    7. Hospital delirium with visual hallucination, resolved    8. Adjustment disorder  - resolved delirium - stopped risperdal.    9. Acute/post-operative pain  - Being covered by surgeon at this time, acetaminophen, oxycodone, PRN robaxin     10.  Neurogenic syncope  - Hx cardiac workup for syncope, including Holter and echocardiogram, which did not show cardiac cause; Syncope thought to be neurogenic    11. Neuropathic pain  -Lyrica 150 mg BID     12. NASIM (obstructive sleep apnea)  - Not using CPAP at home, can revisit this in the future as patient gets adjusted ot being at home    13. Hypertension  - Losartan, Norvasc, cardura     Patient let go from job due to multiple medical issues and length of leave. Needs medical assistance initiated and has not been done yet, placed an order for Social Work to call patient to help with this    F/u 3 months or sooner if needed       Subjective     Transitional Care Management Review:   Levar Anderson is a 48 y.o. male here for TCM follow up. During the TCM phone call patient stated:  TCM Call     Date and time call was made  9/26/2023  1:21 PM    Hospital care reviewed  Records reviewed    Patient was hospitialized at  [facility]    Date of Admission  09/01/23    Date of discharge  09/23/23    Diagnosis  Vision loss L eye    Disposition  Home    Current Symptoms  Weakness    Weakness severity  Moderate      TCM Call     Post hospital issues  None    Should patient be enrolled in anticoag monitoring? No    Scheduled for follow up? Yes    Patients specialists  Other (comment)    Other specialists names  neurosurgery    I have advised the patient to call PCP with any new or worsening symptoms  Kalie Bryson is a 48 y.o. male right-hand-dominant with a PMH of obstructive sleep apnea, Chiari malformation status post craniotomy in January 2023 and right ventriculoperitoneal shunt (VPS) placement in March 1734 which was complicated by incisional drainage and CSF leak required repair on 6/16/2023 and revision of VPS to lumbar peritoneal shunt 6/27/2023 who presented to [facility] on 7/10/2023 with complaint of worsening headache, neck pain and fevers. Lumbar peritoneal shunt was tapped.  CSF fluid analysis grew staph epi. On 7/26, he was febrile. Repeat blood culture grew Klebsiella. Given ongoing ventriculitis and pseudomeningocele infection, patient underwent ventriculoperitoneal shunt removal with placement of a lumboperitoneal shunt on 7/11. He returned to the OR on 8/4/2020 for thoracic and lumbar washout with lumbar durotomy repair and NLD placement. Lumboperitoneal shunt was again placed on 8/17 with revision on 8/18. Patient was treated with IV meropenem for infection through 8/25. ALDO was negative for endocarditis. Hospital course was further complicated by acute infarct affecting bilateral hemisphere, confirmed on MRI brain 7/26 after patient developed bilateral low extremities weakness. 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 There are multiple areas of new infarct on repeat MRI 7/29, most notably in the bilateral frontal/parietal lobes. Low extremity ultrasound was negative for DVT. Patient stays on aspirin and Lipitor for secondary stroke prevention. He was also evaluated by ophthalmology for branch retinal vein occlusion. There was no urgent intervention recommended inpatient. He was started on risperidone by psychiatry for treatment of delirium, visual hallucination and adjustment disorder. He required intermittent straight cath and was started on Flomax for urinary retention. Patient was transferred to [facility] on 9/1/23 for acute rehab. Patient here for follow up with daughter. Patient is now walking with walker, has better use of left side, stronger. Has Ot/PT in home, possible transition to out of home for strengthening. Still using pain medications. Feels down but is working on this, medications ok at this time. Has been let go from job due to length of time out. Needs insurance.       Review of Systems    Objective     /62   Temp 97.8 °F (36.6 °C) (Oral)   Resp 16   Ht 5' 8" (1.727 m)   Wt 117 kg (258 lb 11.2 oz)   BMI 39.34 kg/m² Physical Exam  Constitutional:       Appearance: Normal appearance. HENT:      Head: Normocephalic and atraumatic. Cardiovascular:      Rate and Rhythm: Normal rate and regular rhythm. Pulses: Normal pulses. Heart sounds: Normal heart sounds. Pulmonary:      Effort: Pulmonary effort is normal.      Breath sounds: Normal breath sounds. Musculoskeletal:      Comments: Using walker   Neurological:      General: No focal deficit present. Mental Status: He is alert and oriented to person, place, and time. Psychiatric:         Mood and Affect: Mood normal.         Behavior: Behavior normal.         Thought Content:  Thought content normal.         Judgment: Judgment normal.       Medications have been reviewed by provider in current encounter    Ran Nuñez PA-C

## 2023-09-29 ENCOUNTER — TELEPHONE (OUTPATIENT)
Dept: FAMILY MEDICINE CLINIC | Facility: CLINIC | Age: 53
End: 2023-09-29

## 2023-09-29 DIAGNOSIS — I63.9 CEREBROVASCULAR ACCIDENT (CVA), UNSPECIFIED MECHANISM (HCC): Primary | ICD-10-CM

## 2023-09-29 NOTE — TELEPHONE ENCOUNTER
Patient has home care nurses who come in to give him PT. They advised patient that they think he would benefit more by going to an outpatient PT office.     Can you put in an order for PT?

## 2023-10-02 ENCOUNTER — TELEPHONE (OUTPATIENT)
Dept: FAMILY MEDICINE CLINIC | Facility: CLINIC | Age: 53
End: 2023-10-02

## 2023-10-02 NOTE — PROGRESS NOTES
PT Evaluation     Today's date: 10/3/2023  Patient name: Phong Pennington  : 1970  MRN: [id number]  Referring provider: Sly Aggarwal*  Dx:   Encounter Diagnosis     ICD-10-CM    1. Cerebrovascular accident (CVA), unspecified mechanism ([address])  I63.9 Ambulatory Referral to Physical Therapy      2. Meningitis  G03.9       3. Difficulty walking  R26.2                      Assessment  Assessment details: Pt is a 48 y.o. 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 Today's assessment reflects elevated falls risk and reduced CV stamina as indicated by 2MWT and ABC scoring. 5xSTS and MMTs reflect reduced LE strength and power, which contribute to pt's feeling of weakness and difficulty navigating stairs. Pt also exhibited coordination impairments which impacts his ability to correct sequence gait. IE examination limited today due to time constraints, but plan to complete additional testing at next visit. Pt will benefit from skilled physical therapy intervention addressing strength, balance, and gait deficits so that she may be able to navigate his environment safely and with increased independence. Impairments: abnormal coordination, abnormal gait, abnormal muscle firing, abnormal muscle tone, abnormal or restricted ROM, abnormal movement, activity intolerance, difficulty understanding, impaired balance, impaired physical strength, lacks appropriate home exercise program, safety issue, poor posture  and poor body mechanics  Functional limitations: gait, balance, transfers. Understanding of Dx/Px/POC: good   Prognosis: fair    Goals  STG: To be achieved within 4 weeks  1. Pt to be independent with HEP to maximize carry over skilled PT intervention at home  2. Pt to improve 5xSTS score by 2.3s to reflect improvement in hip extensor strength and improve STS transfers  3.  Pt to improve gait speed by MCID stroke . 14s so to reflect improvement in gait speed   4. Pt to improve hip flexor, hip abductor, and hip extensor strength to 4/5 MMT to improve recruitment of hip strategy  5. Pt to improve ankle DF strength to 4/5 MMT to reduce instances of foot drag in gait  6. Pt to exhibit proper sequencing with use of AD to improve ambulation and stability during gait  7. Pt to 2MWT by MDC of 40ft to reflect improvement in CV endurance. LT weeks  1. Pt to improve FGA score to >22/30 to reflect reduced risk of falls  2. Pt to improve gait speed to within age matched normative values 1.39m/s at self selected speed to improve safety when crossing the street  3. Pt to improve hip flexor, abductor, and extensor MMT to 5/5 to aid in proper recruitment of hip strategy during gait  4. Pt to improve ankle DF MMT to 5/5 to reduce instances of foot drag during gait. 5. Pt to improve endurance so that 6MWT can be assessed. 6. Pt will be able to improve safety to begin initiation of gait training with SPC.      All data taken from [facility] Neuro Section or Rehab Measures, UDPT Normative Values sheet    GARZA test: 46/56                                                    5 x STS Test:  MDC: 6 points                                                                       MDC: 2.3 seconds   age norms                                                                           Age Norms   57-79 year old = M: 54, F: 49                                             61-73 year old: 11.4 seconds   73-78 year old = M 47,  F: 49                                             61-73 year old: 12.6 seconds    80-80 year old = M46,   F: 48                                             80-80 year old: 14.8 seconds     TUG test:                                                                     10 Meter Walk Test:  MDC: 4.14 seconds                                                                MDC: .59 ft/sec  Cut off score for Falls Age Norms  > 13.5 seconds community dwelling adults                20-29; M: 4.56 ft/sec F: 4.62 ft/sec  > 32.2 Frail Elderly                                                     30-39: M 4.76 ft/sec  F: 4.68 ft/sec                                                                                                     40-49: M: 4.79 ft/sec  F: 4.62 ft/sec  6 Minute Walk Test                                                              50-59: M: 4.76 ft/sec  F: 4.56 ft/sec  MDC: 190 feet                                                                        60-69: M: 4.56 ft/sec  F: 4.26 ft/sec  Age Norms                                                                              70-+    M: 4.36 ft/sec  F: 4.16 ft/sec  60-69:    M: 1876 F: 3260  44-59:    M: 80 F: 1545  80-89 +: M: 80 F; 1286              4 square step test          Age matched Norms:          -Acute stroke: 20.8 seconds- 17.5 seconds          -Older adults/geriatrics: 32.6 seconds (multiple fallers)/17.6 seconds (non-fallers)          -Parkinson's: On Drug time: 9.6 secs/ Off Drug time: 11 .02secs         MCID: Not established         MDC: Not established         Cut off scores (for falls risk)          -Older adults/Geriatric: > 15 seconds          -Vestibular: > 12 seconds          -Transtibial amputations: >24 seconds at risk for falls          -Acute stroke: failed attempt or > 15 seconds          -Parkinson's disease: < 9.68 seconds      FGA  Age matched Norms  -40-49 years= 28.9/ 50-59 years=28.4  -60-69 years=27.1/70-79 years=24.9  -80/89 years=20.8  MCID: Vestibular disorders: 8 points  MDC: Parkinsons: 0.61, Stroke: 4.2 points  Cut-offs:  -Community dwelling older adults   -22/30 : predict falls (Sensitivity 85%, Specificity 86%)   -20/30 (unexplained falls in the next 6 months) (Sensitivity 100%, Specificity 76%)  -Parkinson's   -5/30 (identify fallers in Parkinson's)        Plan  Plan details: Therapeutic activities: to address functional deficits by training transfers, gait, and tasks essential for daily functioning  Therapeutic exercise: address strength, ROM, and tissue length deficits   Neuromuscular Reeducation: addresses balance deficits, vestibular impairments, gaze stabilization, oculomotor impairments, coordination, & PNF  Manual Intervention: address joint mobility deficits, soft tissue restrictions, and pain relieving soft tissue mobilization & IASTM provided by the physical therapist.   Patient would benefit from: skilled physical therapy  Planned modality interventions: electrical stimulation/Russian stimulation, thermotherapy: hydrocollator packs and cryotherapy  Planned therapy interventions: abdominal trunk stabilization, activity modification, balance, balance/weight bearing training, body mechanics training, flexibility, functional ROM exercises, gait training, graded activity, graded exercise, home exercise program, IADL retraining, neuromuscular re-education, motor coordination training, joint mobilization, manual therapy, orthotic fitting/training, orthotic management and training, patient education, postural training, strengthening, stretching, therapeutic activities, therapeutic exercise, therapeutic training, transfer training, wheelchair management and work reintegration  Frequency: 2x week  Duration in visits: 16  Duration in weeks: 8  Plan of Care beginning date: 10/3/2023  Plan of Care expiration date: 11/28/2023  Treatment plan discussed with: patient, caregiver and family      Subjective Evaluation    History of Present Illness  Date of onset: 7/17/2023  Date of surgery: 1/3/2023  Mechanism of injury: Pt states that he underwent a surgery for chiari malformation on 1/3/2023 when the surgeon accidentally cut his membrane and caused a CSF leak. He then went on to have 15 additional surgeries and two shunts placed, one in his head and one in his back.  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 He was admitted to the hospital on July 10th 5423 due to complications with the shunts. When he was in the hospital, he suffered two strokes on July 17th. Testing revealed meningitis and ventriculitis, which they feel caused the strokes. He had both a left sided and a right sided stroke. Initially, he was placed on life support due to the severity of the CVAs. However, he improved quickly. He was unable to sit up initially and had no mobility on his left side. He had inpatient rehab for 22 days. Now, he is using a rollator for short distances, and is able to shower himself with a shower chair and mobility aid to lift his LE. Pt notes that at this time it is still hard for him to  his left foot. He notes a little numbness in his right calf. He has had two falls since the CVA. One occurred in the inpatient facility while using the bedside urinal, and the other one occurred in his home when his RW got stuck in the bathroom. His daughter, Zane Harada, has since moved home with home and is his primary caretaker at this time. She supplied a portion of the subjective today. She supervises him on stairs and brings a WC in case of longer distance travels. 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 He would also like to be able to ambulate without an RW, and be able to walk for longer without limitation. They need to leave a couple minutes before 9:30am today.            Not a recurrent problem   Quality of life: fair    Patient Goals  Patient goals for therapy: increased strength, independence with ADLs/IADLs, return to sport/leisure activities, return to work, improved balance and increased motion    Social Support  Steps to enter house: no  Stairs in house: yes 6  Lives in: multiple-level home  Lives with: adult children    Employment status: not working  Hand dominance: right    Treatments  Discharged from (in last 30 days): home health care        Objective     Strength/Myotome Testing     Left Hip   Planes of Motion   Flexion: 3+    Right Hip   Planes of Motion   Flexion: 4    Left Knee   Extension: 2+    Right Knee   Extension: 3+    Left Ankle/Foot   Dorsiflexion: 2+    Right Ankle/Foot   Dorsiflexion: 3+  Neuro Exam:     Coordination   Heel to shin: right WNL  Heel to shin: left dysmetric  Finger to nose: left WNL and right WNL  Rapid alternating movements: UE impaired and LE impaired     Functional outcomes   5x sit to stand: 20.78s no UE support (seconds)  2 minute walk test: 168. 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 POC Expires:11/28/23      Objective IE 10/3            ABC 26.25%            5xSTS 20.78s no UE            2MWT 168. 3ft            Manuals                                                                 Neuro Re-Ed                                                                                                        Ther Ex                                                                                                                     Ther Activity                                       Gait Training                                       Modalities

## 2023-10-03 ENCOUNTER — EVALUATION (OUTPATIENT)
Facility: CLINIC | Age: 53
End: 2023-10-03
Payer: COMMERCIAL

## 2023-10-03 DIAGNOSIS — G03.9 MENINGITIS: ICD-10-CM

## 2023-10-03 DIAGNOSIS — I63.9 CEREBROVASCULAR ACCIDENT (CVA), UNSPECIFIED MECHANISM (HCC): Primary | ICD-10-CM

## 2023-10-03 DIAGNOSIS — R26.2 DIFFICULTY WALKING: ICD-10-CM

## 2023-10-03 PROCEDURE — 97162 PT EVAL MOD COMPLEX 30 MIN: CPT

## 2023-10-04 PROBLEM — T88.8XXA FLUID COLLECTION AT SURGICAL SITE: Status: RESOLVED | Noted: 2023-01-30 | Resolved: 2023-10-04

## 2023-10-04 PROBLEM — I63.9 ACUTE ISCHEMIC STROKE (HCC): Status: ACTIVE | Noted: 2023-10-04

## 2023-10-04 PROBLEM — R55 NEUROGENIC SYNCOPE: Status: RESOLVED | Noted: 2022-12-14 | Resolved: 2023-10-04

## 2023-10-04 PROBLEM — T81.30XA WOUND DEHISCENCE: Status: RESOLVED | Noted: 2023-06-26 | Resolved: 2023-10-04

## 2023-10-04 PROBLEM — F17.200 SMOKER: Status: RESOLVED | Noted: 2019-12-06 | Resolved: 2023-10-04

## 2023-10-05 DIAGNOSIS — G93.5 CHIARI MALFORMATION TYPE I (HCC): ICD-10-CM

## 2023-10-05 DIAGNOSIS — I63.9 ACUTE ISCHEMIC STROKE (HCC): Primary | ICD-10-CM

## 2023-10-08 NOTE — PROGRESS NOTES
Daily Note     Today's date: 10/9/2023  Patient name: Duane Tong  : 1970  MRN: [id number]  Referring provider: Arvind Tenorio*  Dx:   Encounter Diagnosis     ICD-10-CM    1. Cerebrovascular accident (CVA), unspecified mechanism ([address])  I63.9       2. Meningitis  G03.9       3. Difficulty walking  R26.2                      Subjective: Pt states that his lumbar shunt has been bothering him and is very painful. They want to know if they could follow up with their physician about it. No new falls. Objective: See treatment diary below      Assessment: Pt participated in a skilled PT session focused on balance, gait, and strengthening intervention. Due to time constraints from IE, additional dynamic balance testing was completed today and demonstrated that pt is at risk for falls. GARZA testing was performed today and pt scored a 36, reflecting medium risk of falls. Gait training in SOLO with [facility] on R side was implemented and pt reported rapid onset of muscular fatigue requiring frequent rest breaks. Hurdles were performed with [facility] and pt required cueing for sequencing. Due to complaints of pain around the shunt, shunt was assessed visually and pt exhibited increased redness and warmth with performance, so encouraged them to follow up with her doctor. Pt will continue to benefit from skilled PT intervention focused on balance, gait training, and strengthening interventions so that he may be able to ambulate with increased safety and stability. Plan: Continue per plan of care. Precautions: CVA, history of meningitis due to Klebsiella pneumoniae, HTN, Chiari Malformation Type 1 and repair, HCC, anxiety & depression, HA's, neurogenic syncope, Craniectomy suboccipital w/ cervical laminectomy/chiari, [place] Allergy, Medical tape Allergy, Lisinopril Allergy.      MCTSIB: Feet together EO 30s increased sweay        Feet together EC 11s increased sway     POC Expires:23      Objective IE 10/3 10/9           ABC 26.25%            5xSTS 20.78s no UE            2MWT 168. 3ft            Garza  36           Manuals                                                                 Neuro Re-Ed             Gait training in SOLO  W SPC 10x 20ft down and back           Hurdles   In SOLO 4 hurdles lowest setting 6x with SPC           Static balance  Feet together EC 2x30"           Tandem gait  2x5ft with intermittent modA from PT           Testing  GARZA                                     Ther Ex                                                                                                                     Ther Activity             Skin inspection  Of shunt and area surrounding, found increased errhythmia                        Gait Training                                       Modalities

## 2023-10-09 ENCOUNTER — OFFICE VISIT (OUTPATIENT)
Facility: CLINIC | Age: 53
End: 2023-10-09
Payer: COMMERCIAL

## 2023-10-09 DIAGNOSIS — R26.2 DIFFICULTY WALKING: ICD-10-CM

## 2023-10-09 DIAGNOSIS — G03.9 MENINGITIS: ICD-10-CM

## 2023-10-09 DIAGNOSIS — I63.9 CEREBROVASCULAR ACCIDENT (CVA), UNSPECIFIED MECHANISM (HCC): Primary | ICD-10-CM

## 2023-10-09 PROCEDURE — 97112 NEUROMUSCULAR REEDUCATION: CPT

## 2023-10-10 ENCOUNTER — OFFICE VISIT (OUTPATIENT)
Dept: FAMILY MEDICINE CLINIC | Facility: CLINIC | Age: 53
End: 2023-10-10
Payer: COMMERCIAL

## 2023-10-10 ENCOUNTER — PATIENT OUTREACH (OUTPATIENT)
Dept: FAMILY MEDICINE CLINIC | Facility: CLINIC | Age: 53
End: 2023-10-10

## 2023-10-10 VITALS
HEIGHT: 68 IN | RESPIRATION RATE: 16 BRPM | BODY MASS INDEX: 39.4 KG/M2 | SYSTOLIC BLOOD PRESSURE: 112 MMHG | HEART RATE: 84 BPM | WEIGHT: 260 LBS | DIASTOLIC BLOOD PRESSURE: 76 MMHG | TEMPERATURE: 98.9 F | OXYGEN SATURATION: 97 %

## 2023-10-10 DIAGNOSIS — F43.23 SITUATIONAL MIXED ANXIETY AND DEPRESSIVE DISORDER: Primary | ICD-10-CM

## 2023-10-10 PROCEDURE — 99214 OFFICE O/P EST MOD 30 MIN: CPT | Performed by: PHYSICIAN ASSISTANT

## 2023-10-10 NOTE — PROGRESS NOTES
Assessment/Plan:    1. Situational depression - will restart zoloft, refer to therapist, discussed at length. Will most likely need to increase to 100 mg    F/u as needed    Subjective:   Chief Complaint   Patient presents with   • Depression      Patient ID: Jason Mendez is a 48 y.o. male. Paitent here for short follow up. Since being home for the past week increase thoughts of not wanting to be alive. Making progress with PT, doing outpatient and using a cane. Frustrated it is not happening faster, everything is an effort, no income. Has gotten out to see friends, has a plan to be back to work by 12/2023. Daughter here for support.        The following portions of the patient's history were reviewed and updated as appropriate: allergies, current medications, past family history, past medical history, past social history, past surgical history and problem list.    Past Medical History:   Diagnosis Date   • Head ache    • Hypertension    • Stroke [facility])      Past Surgical History:   Procedure Laterality Date   • CARPAL TUNNEL RELEASE Left    • CRANIECTOMY SUBOCCIPITAL W/ CERVICAL LAMINECTOMY / CHIARI       Family History   Problem Relation Age of Onset   • No Known Problems Mother    • No Known Problems Father    • Cancer Maternal Grandmother    • Cancer Maternal Grandfather    • Substance Abuse Brother    • Substance Abuse Brother    • Alcohol abuse Neg Hx    • Mental illness Neg Hx      Social History     Socioeconomic History   • Marital status: Single     Spouse name: Not on file   • Number of children: Not on file   • Years of education: Not on file   • Highest education level: Not on file   Occupational History   • Not on file   Tobacco Use   • Smoking status: Former     Packs/day: 0.50     Types: Cigarettes   • Smokeless tobacco: Never   Vaping Use   • Vaping Use: Never used   Substance and Sexual Activity   • Alcohol use: Not Currently     Comment: Everyday    • Drug use: No   • Sexual activity: Yes Partners: Female   Other Topics Concern   • Not on file   Social History Narrative   • Not on file     Social Determinants of Health     Financial Resource Strain: Not on file   Food Insecurity: Not on file   Transportation Needs: Not on file   Physical Activity: Not on file   Stress: Not on file   Social Connections: Not on file   Intimate Partner Violence: Not on file   Housing Stability: Not on file       Current Outpatient Medications:   •  acetaminophen (TYLENOL) 500 mg tablet, Take 500 mg by mouth every 4 (four) hours as needed, Disp: , Rfl:   •  amLODIPine (NORVASC) 10 mg tablet, Take 10 mg by mouth daily, Disp: , Rfl:   •  atorvastatin (LIPITOR) 40 mg tablet, TAKE 1 TABLET BY MOUTH EVERY DAY AT NIGHT, Disp: , Rfl:   •  Cyanocobalamin 1000 MCG SUBL, Place 1 tablet (1,000 mcg total) under the tongue daily, Disp: 90 tablet, Rfl: 0  •  doxazosin (CARDURA) 4 mg tablet, TAKE 1 TABLET BY MOUTH EVERY DAY NIGHTLY, Disp: , Rfl:   •  ferrous sulfate 325 (65 Fe) mg tablet, Take 1 tablet by mouth every other day, Disp: , Rfl:   •  hydrOXYzine HCL (ATARAX) 25 mg tablet, TAKE 1 TABLET BY MOUTH EVERYDAY AT BEDTIME, Disp: 30 tablet, Rfl: 0  •  losartan (Cozaar) 25 mg tablet, Take 1 tablet (25 mg total) by mouth daily, Disp: 90 tablet, Rfl: 1  •  methocarbamol (ROBAXIN) 500 mg tablet, Take 500 mg by mouth 4 (four) times a day as needed, Disp: , Rfl:   •  mometasone (ELOCON) 0.1 % cream, Apply topically daily (Patient taking differently: Apply topically as needed), Disp: 45 g, Rfl: 3  •  multivitamin (THERAGRAN) TABS, Take 1 tablet by mouth daily, Disp: , Rfl:   •  Polyethylene Glycol 3350 POWD, Take 17 g by mouth daily, Disp: , Rfl:   •  sertraline (ZOLOFT) 50 mg tablet, Take 1 tablet (50 mg total) by mouth daily, Disp: 90 tablet, Rfl: 3  •  oxyCODONE (ROXICODONE) 5 immediate release tablet, TAKE 1 TABLET BY MOUTH EVERY 6 HOURS AS NEEDED FOR MODERATE PAIN (SCALE 4-6/10) FOR UP TO 10 DAYS. (Patient not taking: Reported on 10/10/2023), Disp: , Rfl:   •  oxyCODONE-acetaminophen (PERCOCET) 5-325 mg per tablet, , Disp: , Rfl:     Current Facility-Administered Medications:   •  cyanocobalamin injection 1,000 mcg, 1,000 mcg, Intramuscular, Q30 Days, Elder MD Fidencio, 1,000 mcg at 09/14/20 0841    Review of Systems          Objective:    Vitals:    10/10/23 0852   BP: 112/76   Pulse: 84   Resp: 16   Temp: 98.9 °F (37.2 °C)   TempSrc: Temporal   SpO2: 97%   Weight: 118 kg (260 lb)   Height: 5' 8" (1.727 m)        Physical Exam  Constitutional:       Appearance: Normal appearance. He is normal weight. HENT:      Head: Normocephalic and atraumatic. Neurological:      General: No focal deficit present. Mental Status: He is alert and oriented to person, place, and time. Psychiatric:         Attention and Perception: Attention and perception normal.         Mood and Affect: Mood is depressed. Affect is tearful. Speech: Speech normal.         Behavior: Behavior normal. Behavior is cooperative. Thought Content: Thought content normal.         Cognition and Memory: Cognition and memory normal.         Judgment: Judgment normal.         I have spent a total time of 35 minutes on 10/10/23 in caring for this patient including Risks and benefits of tx options, Instructions for management, Patient and family education, Importance of tx compliance, Risk factor reductions, Impressions and Counseling / Coordination of care Depression Screening Follow-up Plan: Patient's depression screening was positive with a PHQ-2 score of . Their PHQ-9 score was 13. Patient assessed for underlying major depression. They have no active suicidal ideations. Brief counseling provided and recommend additional follow-up/re-evaluation next office visit. Patient's depressive symptoms likely due to other medical condition. Would recommend treatment of underlying condition. Will continue to monitor at next office visit. Lore Burns

## 2023-10-10 NOTE — PROGRESS NOTES
KINGS ELLIS received referral from patient's PCP no description provided. KINGS ELLIS reviewed patient's chart and called patient ([phone]). Patient answered and KINGS ELLIS completed [facility] psychosocial assessment with patient. Patient is single, employed and his 25year old daughter lives with him. Patient has no financial issues and no barriers to receiving care or medications. Patient utilizes a RW for now but is primarily independent with all ADLs/ IADLs. Patient has several friends and family members that he considers his support. Patient has a car and drives himself or his daughter drives him to appointments. Patient reports no prior MH hx. Patient has not had any SI, HI plan to kill or harm himself or others. Patient and KINGS ELLIS discussed that if he does [facility] a [id number] emergency, he should call the crisis hotline, 911 or go to the ER. Patient gave verbal understanding. KINGS ELLIS discussed with patient all member service number on insurance card and requesting a list of in network [id number] [facility] [place] providers and then calling therapist from that list. Patient to do so. Patient would like KINGS ELLIS to follow up with him in about two weeks regarding, KINGS ELLIS to do so. KINGS ELLIS encouraged patient to call KINGS ELLIS should any questions or other needs arise. KINGS ELLIS will continue to follow.

## 2023-10-10 NOTE — LETTER
October 10, 2023       Patient: Brain Kristi   YOB: 1970   Date of Visit: 10/10/2023     To Whom it May Concern    Jacinto Thakur,  1970, has been a patient of ours for many years. He had a congenital chiari malformation that became symptomatic. He had his first surgery at [facility] on January 3, 2023. He has had multiple complications including bacterial meningitis, failed surgeries and ultimately having a stroke on 2023 causing left sided paralysis. Patient cannot work. If you have questions or are in need for more information, please do not hesitate to call me.           Sincerely,        Ran Nuñez PA-C

## 2023-10-13 ENCOUNTER — OFFICE VISIT (OUTPATIENT)
Facility: CLINIC | Age: 53
End: 2023-10-13
Payer: COMMERCIAL

## 2023-10-13 DIAGNOSIS — G03.9 MENINGITIS: ICD-10-CM

## 2023-10-13 DIAGNOSIS — I63.9 CEREBROVASCULAR ACCIDENT (CVA), UNSPECIFIED MECHANISM (HCC): Primary | ICD-10-CM

## 2023-10-13 DIAGNOSIS — R26.2 DIFFICULTY WALKING: ICD-10-CM

## 2023-10-13 PROCEDURE — 97112 NEUROMUSCULAR REEDUCATION: CPT

## 2023-10-13 NOTE — PROGRESS NOTES
Daily Note     Today's date: 10/13/2023  Patient name: Duane Tong  : 1970  MRN: [id number]  Referring provider: Arvind Tenorio*  Dx:   Encounter Diagnosis     ICD-10-CM    1. Cerebrovascular accident (CVA), unspecified mechanism ([address])  I63.9       2. Meningitis  G03.9       3. Difficulty walking  R26.2           Start Time: 1015  Stop Time: 1100  Total time in clinic (min): 45 minutes    Subjective: Pt presents today with his daughter, who remained in the waiting room for today's session. They reached out to the surgeon regarding needing a helmet and the surgeon stated that he does not need a helmet. Additionally, they are unable to get an in-person appointment with their doctor about the redness and irritation of the lumbar shunt until . However, they sent an image in for assessment and the physician stated that it was a non-urgent matter. He has a lot of pain in his back today from the shunt. He also states that he has an appointment with a psychiatrist to help with his mental health after the stroke, and was put on medications to aid in this. Objective: See treatment diary below      Assessment: Pt participated in a skilled PT session focused on balance, gait, and strengthening intervention. Plan: Continue per plan of care. Continue with skilled PT intervention and progress in accordance with soreness rules. Monitor for changes in symptoms. F/u with pt regarding dizziness, instability, or new falls at next visit. 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      MCTSIB: Feet together EO 30s increased sweay        Feet together EC 11s increased sway     POC Expires:23      Objective IE 10/3 10/9 10/13          ABC 26.25% 5xSTS 20.78s no UE            2MWT 168. 3ft            Garza  36           Manuals                                                                 Neuro Re-Ed             Gait training in SOLO  W SPC 10x 20ft down and back -SPC 8x 40ft supervision    -SPC 8x 40ft 2.5lb AW on L LE intermittent CGA    -Backwards walking with SPC 4x40ft    -side stepping 2lb weight on L LE 8x 40ft              Hurdles   In SOLO 4 hurdles lowest setting 6x with SPC           Static balance  Feet together EC 2x30"           Tandem gait  2x5ft with intermittent modA from PT           Testing  GARZA           Dynamic Balance   -standing step taps with SPC, 2x10 bilaterally in SOLO                       Ther Ex                                                                                                                     Ther Activity             Skin inspection  Of shunt and area surrounding, found increased errhythmia                        Gait Training                                       Modalities

## 2023-10-16 ENCOUNTER — OFFICE VISIT (OUTPATIENT)
Facility: CLINIC | Age: 53
End: 2023-10-16
Payer: COMMERCIAL

## 2023-10-16 DIAGNOSIS — R26.2 DIFFICULTY WALKING: ICD-10-CM

## 2023-10-16 DIAGNOSIS — I63.9 CEREBROVASCULAR ACCIDENT (CVA), UNSPECIFIED MECHANISM (HCC): Primary | ICD-10-CM

## 2023-10-16 DIAGNOSIS — G03.9 MENINGITIS: ICD-10-CM

## 2023-10-16 PROCEDURE — 97112 NEUROMUSCULAR REEDUCATION: CPT

## 2023-10-16 NOTE — PROGRESS NOTES
Daily Note     Today's date: 10/16/2023  Patient name: Ada Riddle  : 1970  MRN: [id number]  Referring provider: Wendi Pichardo*  Dx:   Encounter Diagnosis     ICD-10-CM    1. Cerebrovascular accident (CVA), unspecified mechanism ([address])  I63.9       2. Meningitis  G03.9       3. Difficulty walking  R26.2           Start Time: 1715  Stop Time: 1800  Total time in clinic (min): 45 minutes    Subjective: Pt reports significant fatigue today as PT session is later in the day than usual.       Objective: See treatment diary below      Assessment: Pt participated in a skilled PT session focused on balance, gait, and strengthening intervention. Increased rest breaks were implemented due to increased fatigue. Ambulation without AD was performed and pt was able to executive with occasional instances of CGA to recover LOB. Resistance was added to L LE for hurdles and pt could perform with intermittent CGA. Pt will continue to benefit from skilled PT so that he may be able to ambulate with increased safety and independence. Plan: Continue per plan of care. Continue with skilled PT intervention and progress in accordance with soreness rules. Monitor for changes in symptoms. F/u with pt regarding dizziness, instability, or new falls at next visit. 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 MCTSIB: Feet together EO 30s increased sweay        Feet together EC 11s increased sway     POC Expires:23      Objective IE 10/3 10/9 10/13 10/16         ABC 26.25%            5xSTS 20.78s no UE            2MWT 168. 3ft            Briseno  36           Manuals                                                                 Neuro Re-Ed             Gait training in SOLO  W SPC 10x 20ft down and back -SPC 8x 40ft supervision    -SPC 8x 40ft 2.5lb AW on L LE intermittent CGA    -Backwards walking with SPC 4x40ft    -side stepping 2lb weight on L LE 8x 40ft     -No AD 8x 40ft supervision with 2.5lb AW on L LE, intermittent CGA    -side stepping with SPC, 2lb weight on L LE 8x 40ft         Hurdles   In SOLO 4 hurdles lowest setting 6x with SPC  In SOLO 4 hurdles lowest setting no AD 2.5lb AW         Static balance  Feet together EC 2x30"  FT EC 3x1 min         Tandem gait  2x5ft with intermittent modA from PT           Testing  GARZA           Dynamic Balance   -standing step taps with SPC, 2x10 bilaterally in SOLO                       Ther Ex                                                                                                                     Ther Activity             Skin inspection  Of shunt and area surrounding, found increased errhythmia                        Gait Training                                       Modalities

## 2023-10-19 ENCOUNTER — TELEPHONE (OUTPATIENT)
Dept: FAMILY MEDICINE CLINIC | Facility: CLINIC | Age: 53
End: 2023-10-19

## 2023-10-19 ENCOUNTER — APPOINTMENT (OUTPATIENT)
Facility: CLINIC | Age: 53
End: 2023-10-19
Payer: COMMERCIAL

## 2023-10-19 NOTE — TELEPHONE ENCOUNTER
----- Message from Kaden Velazquez PA-C sent at 10/10/2023  9:15 AM EDT -----  Patient had a stroke post op and was following down at [facility], needs to transition care up to [facility]. Was told April. Can you please call and try to expedite process. Thank you!

## 2023-10-19 NOTE — TELEPHONE ENCOUNTER
Scheduled with [facility] on 10/25 @ 1 PM with Dr. Cem Otero. Left message for pt to call back to discuss details.

## 2023-10-20 ENCOUNTER — OFFICE VISIT (OUTPATIENT)
Facility: CLINIC | Age: 53
End: 2023-10-20
Payer: COMMERCIAL

## 2023-10-20 DIAGNOSIS — I63.9 CEREBROVASCULAR ACCIDENT (CVA), UNSPECIFIED MECHANISM (HCC): Primary | ICD-10-CM

## 2023-10-20 DIAGNOSIS — R26.2 DIFFICULTY WALKING: ICD-10-CM

## 2023-10-20 DIAGNOSIS — G03.9 MENINGITIS: ICD-10-CM

## 2023-10-20 PROCEDURE — 97112 NEUROMUSCULAR REEDUCATION: CPT

## 2023-10-20 NOTE — PROGRESS NOTES
Daily Note     Today's date: 10/20/2023  Patient name: Ida Puga  : 1970  MRN: [id number]  Referring provider: Suraj Caceres Expose*  Dx:   Encounter Diagnosis     ICD-10-CM    1. Cerebrovascular accident (CVA), unspecified mechanism ([address])  I63.9       2. Meningitis  G03.9       3. Difficulty walking  R26.2           Start Time: 1100  Stop Time: 1145  Total time in clinic (min): 45 minutes    Subjective: Pt states that his back is still bothering him. He took tylenol before coming in today and is more rested than he was last visit. Objective: See treatment diary below      Assessment: Pt participated in a skilled PT session focused on balance, gait, and strengthening intervention. Pt exhibited improved form with live navigation today, so live height was progressed. Pt continues to exhibit significant hip drop with ambulation, so cone taps were performed gain today to strengthen the abductors on the stance limb as well as improve stance time. Foam EC was added to challenge pt's static balance and he required intermittent anterior tactile cueing to correct for posterior weight shift. Pt will continue to benefit from skilled PT intervention focused on balance, gait, and strengthening intervention so that he may be able to ambulate safely and without pain. Plan: Continue per plan of care. 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 MCTSIB: Feet together EO 30s increased sweay        Feet together EC 11s increased sway     POC Expires:23      Objective IE 10/3 10/9 10/13 10/16 10/20        ABC 26.25%            5xSTS 20.78s no UE            2MWT 168. 3ft            Briseno  36           Manuals Neuro Re-Ed             Gait training in SOLO  W SPC 10x 20ft down and back -SPC 8x 40ft supervision    -SPC 8x 40ft 2.5lb AW on L LE intermittent CGA    -Backwards walking with SPC 4x20ft    -side stepping 2lb weight on L LE 8x 20ft     -No AD 8x 40ft supervision with 2.5lb AW on L LE, intermittent CGA    -side stepping with SPC, 2lb weight on L LE 8x 20ft -No AD 8x 40ft with 2.5lb AW on L LE, intermittent CGA 8x 20ft    -side stepping with 2.5lb AW  L LE with no AD        Hurdles   In SOLO 4 hurdles lowest setting 6x with SPC  In SOLO 4 hurdles lowest setting no AD 2.5lb AW In SOLO 6 hurdles middle setting no AD 2.5lb AW        Static balance  Feet together EC 2x30"  FT EC 3x1 min FT EC 3x1 min         Tandem gait  2x5ft with intermittent modA from PT           Testing  GARZA           Dynamic Balance   -standing step taps with SPC, 2x10 bilaterally in SOLO  -standing cone taps in SOLO 20x/LE    -Backwards walking 6x20ft                     Ther Ex                                                                                                                     Ther Activity             Skin inspection  Of shunt and area surrounding, found increased errhythmia                        Gait Training                                       Modalities

## 2023-10-23 ENCOUNTER — TELEPHONE (OUTPATIENT)
Facility: CLINIC | Age: 53
End: 2023-10-23

## 2023-10-23 DIAGNOSIS — G89.29 OTHER CHRONIC PAIN: ICD-10-CM

## 2023-10-23 DIAGNOSIS — I63.9 ACUTE ISCHEMIC STROKE (HCC): ICD-10-CM

## 2023-10-23 DIAGNOSIS — I10 ESSENTIAL HYPERTENSION: Primary | ICD-10-CM

## 2023-10-23 DIAGNOSIS — D64.9 ANEMIA, UNSPECIFIED TYPE: ICD-10-CM

## 2023-10-23 RX ORDER — FERROUS SULFATE 325(65) MG
1 TABLET ORAL EVERY OTHER DAY
Qty: 90 TABLET | Refills: 1 | Status: SHIPPED | OUTPATIENT
Start: 2023-10-23

## 2023-10-23 RX ORDER — ATORVASTATIN CALCIUM 40 MG/1
40 TABLET, FILM COATED ORAL DAILY
Qty: 90 TABLET | Refills: 1 | Status: SHIPPED | OUTPATIENT
Start: 2023-10-23

## 2023-10-23 RX ORDER — PREGABALIN 150 MG/1
150 CAPSULE ORAL 2 TIMES DAILY
Qty: 60 CAPSULE | Refills: 0 | Status: SHIPPED | OUTPATIENT
Start: 2023-10-23

## 2023-10-23 RX ORDER — AMLODIPINE BESYLATE 10 MG/1
10 TABLET ORAL DAILY
Qty: 90 TABLET | Refills: 1 | Status: SHIPPED | OUTPATIENT
Start: 2023-10-23

## 2023-10-23 RX ORDER — PREGABALIN 150 MG/1
150 CAPSULE ORAL 2 TIMES DAILY
COMMUNITY
End: 2023-10-23 | Stop reason: SDUPTHER

## 2023-10-23 RX ORDER — DOXAZOSIN MESYLATE 4 MG/1
4 TABLET ORAL
Qty: 90 TABLET | Refills: 1 | Status: SHIPPED | OUTPATIENT
Start: 2023-10-23

## 2023-10-23 NOTE — TELEPHONE ENCOUNTER
Reached out to patient's family medicine provider regarding pt's pain around his lumbar shunt. Family medicine provider requested that the PT follow up with the neurosurgeon about the p!.    Reached out to Dr. Raoul Rea, patient's neurosurgeon, regarding pt's pain complaints around his shunt site. Waited for 9 rings prior to hanging up. Will attempt again.

## 2023-10-24 ENCOUNTER — PATIENT OUTREACH (OUTPATIENT)
Dept: FAMILY MEDICINE CLINIC | Facility: CLINIC | Age: 53
End: 2023-10-24

## 2023-10-24 ENCOUNTER — OFFICE VISIT (OUTPATIENT)
Facility: CLINIC | Age: 53
End: 2023-10-24
Payer: COMMERCIAL

## 2023-10-24 ENCOUNTER — TELEPHONE (OUTPATIENT)
Facility: CLINIC | Age: 53
End: 2023-10-24

## 2023-10-24 DIAGNOSIS — G03.9 MENINGITIS: ICD-10-CM

## 2023-10-24 DIAGNOSIS — I63.9 CEREBROVASCULAR ACCIDENT (CVA), UNSPECIFIED MECHANISM (HCC): Primary | ICD-10-CM

## 2023-10-24 DIAGNOSIS — R26.2 DIFFICULTY WALKING: ICD-10-CM

## 2023-10-24 PROCEDURE — 97112 NEUROMUSCULAR REEDUCATION: CPT

## 2023-10-24 NOTE — PROGRESS NOTES
KINGS ELLIS called patient ([phone]) to follow up with him regarding progress made to establishing with OP [id number] [place] [address] provider. Patient did not answer. KINGS ELLIS left a message including KINGS ELLIS contact information and requested a call back. KINGS ELLIS will attempt to outreach again on a later date.

## 2023-10-24 NOTE — TELEPHONE ENCOUNTER
Contacted patient's Neurosurgeon, Dr. Damian Osman, regarding his shunt pain per request of his primary care provider. Was on hold for 15 minutes and then had to disconnect the call due to patient care responsibilities. No option to leave voicemail. Will attempt again. This is the second attempt.

## 2023-10-24 NOTE — PROGRESS NOTES
Daily Note     Today's date: 10/24/2023  Patient name: Petra Villatoro  : 1970  MRN: [id number]  Referring provider: Teresa Car*  Dx:   Encounter Diagnosis     ICD-10-CM    1. Cerebrovascular accident (CVA), unspecified mechanism ([address])  I63.9       2. Meningitis  G03.9       3. Difficulty walking  R26.2           Start Time: 1100  Stop Time: 1145  Total time in clinic (min): 45 minutes    Subjective: Pt states that his daughter was able to get him an appointment with a neurologist through [facility] tomorrow. He is looking forward to the appointment, but is unsure what they will do. Objective: See treatment diary below      Assessment: Pt participated in a skilled PT session focused on balance, gait, and strengthening intervention. Gait training was progressed to treadmill today and pt was able to ambulate for 6 minutes prior to becoming limited by a combination of endurance deficits and LBP in shunt. Resistance was progressed on L LE and pt was able to clear hurdles with intermittent errors. Step ups were trailed and pt exhibited independence with stepping up, however initially required modA when lowering back from the step. This improved with repeated trials and pt required CGA by conclusion of repetitions. Pt will continue to benefit from skilled PT intervention focused on balance, gait, and strengthening intervention so that he may be able to ambulate safely and with increased stability. Plan: Continue per plan of care. 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      MCTSIB: Feet together EO 30s increased sweay        Feet together EC 11s increased sway     POC Expires:23      Objective IE 10/3 10/9 10/13 10/16 10/20 10/23       ABC 26.25% 5xSTS 20.78s no UE            2MWT 168. 3ft            Garza  36           Manuals                                                                 Neuro Re-Ed             Gait training in SOLO  W SPC 10x 20ft down and back -SPC 8x 40ft supervision    -SPC 8x 40ft 2.5lb AW on L LE intermittent CGA    -Backwards walking with SPC 4x20ft    -side stepping 2lb weight on L LE 8x 20ft     -No AD 8x 40ft supervision with 2.5lb AW on L LE, intermittent CGA    -side stepping with SPC, 2lb weight on L LE 8x 20ft -No AD 8x 40ft with 2.5lb AW on L LE, intermittent CGA 8x 20ft    -side stepping with 2.5lb AW  L LE with no AD -No AD 8 laps 20ft with 3lb AW on L LE     -treadmill speed 1.0mph incline 1% 6 min 3lb AW on L LE       Hurdles   In SOLO 4 hurdles lowest setting 6x with SPC  In SOLO 4 hurdles lowest setting no AD 2.5lb AW In SOLO 6 hurdles middle setting no AD 2.5lb AW -In SOLO 6 hurdles middle setting no AD 3lb AW       Static balance  Feet together EC 2x30"  FT EC 3x1 min FT EC 3x1 min         Tandem gait  2x5ft with intermittent modA from PT           Testing  GARZA           Dynamic Balance   -standing step taps with SPC, 2x10 bilaterally in SOLO  -standing cone taps in SOLO 20x/LE    -Backwards walking 6x20ft        Step ups & Back      1x8 leading with R LE lowering with L LE.  Initial modA, improved to CGA by conclusion of session       Ther Ex                                                                                                                     Ther Activity             Skin inspection  Of shunt and area surrounding, found increased errhythmia                        Gait Training                                       Modalities

## 2023-10-27 ENCOUNTER — OFFICE VISIT (OUTPATIENT)
Facility: CLINIC | Age: 53
End: 2023-10-27
Payer: COMMERCIAL

## 2023-10-27 DIAGNOSIS — G03.9 MENINGITIS: ICD-10-CM

## 2023-10-27 DIAGNOSIS — R26.2 DIFFICULTY WALKING: ICD-10-CM

## 2023-10-27 DIAGNOSIS — I63.9 CEREBROVASCULAR ACCIDENT (CVA), UNSPECIFIED MECHANISM (HCC): Primary | ICD-10-CM

## 2023-10-27 PROCEDURE — 97112 NEUROMUSCULAR REEDUCATION: CPT

## 2023-10-27 NOTE — PROGRESS NOTES
Daily Note     Today's date: 10/27/2023  Patient name: Adria Birch  : 1970  MRN: [id number]  Referring provider: Dolly Juarez*  Dx:   Encounter Diagnosis     ICD-10-CM    1. Cerebrovascular accident (CVA), unspecified mechanism ([address])  I63.9       2. Meningitis  G03.9       3. Difficulty walking  R26.2           Start Time: 1133  Stop Time: 1218  Total time in clinic (min): 45 minutes    Subjective: Pt states that he saw his neurologist since he was here and the appointment went well. He reports no new changes. Objective: See treatment diary below      Assessment: Pt participated in a skilled PT session focused on balance, gait, and strengthening intervention. Step ups with SPC were performed today to prepare pt for transition from RW to cane, and pt was able to perform with occasional verbal cues for sequencing by the conclusion of the repetitions. Speed was progressed for treadmill and pt required occasional cueing to step to the front of the treadmill and maintain speed. Pt exhibited improved success with hurdles today, with only 1 error throughout the entire exercise. Pt will continue to benefit from skilled PT intervention focused on balance, gait, and strengthening so that he may be able to ambulate safely and with increased stability. Plan: Continue per plan of care. 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 MCTSIB: Feet together EO 30s increased sweay        Feet together EC 11s increased sway     POC Expires:23      Objective IE 10/3 10/9 10/13 10/16 10/20 10/23 10/27      ABC 26.25%            5xSTS 20.78s no UE            2MWT 168. 3ft            Briseno  36           Manuals Neuro Re-Ed             Gait training in [address] [address] 10x 20ft down and back -SPC 8x 40ft supervision    -SPC 8x 40ft 2.5lb AW on L LE intermittent CGA    -Backwards walking with SPC 4x20ft    -side stepping 2lb weight on L LE 8x 20ft     -No AD 8x 40ft supervision with 2.5lb AW on L LE, intermittent CGA    -side stepping with SPC, 2lb weight on L LE 8x 20ft -No AD 8x 40ft with 2.5lb AW on L LE, intermittent CGA 8x 20ft    -side stepping with 2.5lb AW  L LE with no AD -No AD 8 laps 20ft with 3lb AW on L LE     -treadmill speed 1.0mph incline 1% 6 min 3lb AW on L LE -SOLO Treadmill speed 1. 3mph incline 1% 3lb AW on L LE 6 min    -backwards walking 6x20ft no AD in SOLO      Hurdles   In SOLO 4 hurdles lowest setting 6x with SPC  In SOLO 4 hurdles lowest setting no AD 2.5lb AW In SOLO 6 hurdles middle setting no AD 2.5lb AW -In SOLO 6 hurdles middle setting no AD 3lb AW -In SOLO 6 hurdles middle setting no AD 3lb AW      Static balance  Feet together EC 2x30"  FT EC 3x1 min FT EC 3x1 min         Tandem gait  2x5ft with intermittent modA from PT           Testing  GARZA           Dynamic Balance   -standing step taps with SPC, 2x10 bilaterally in SOLO  -standing cone taps in SOLO 20x/LE    -Backwards walking 6x20ft  -Standing Cone taps in SOLO 20x/LE      Step ups      1x8 leading with R LE lowering with L LE.  Initial modA, improved to CGA by conclusion of session 2x10 leading with R LE lowering with L LE with SPC to prepare for weaning off RW. 2x10 middle setting      Ther Ex                                                                                                                     Ther Activity             Skin inspection  Of shunt and area surrounding, found increased errhythmia                        Gait Training                                       Modalities

## 2023-10-31 ENCOUNTER — OFFICE VISIT (OUTPATIENT)
Facility: CLINIC | Age: 53
End: 2023-10-31
Payer: COMMERCIAL

## 2023-10-31 DIAGNOSIS — I63.9 CEREBROVASCULAR ACCIDENT (CVA), UNSPECIFIED MECHANISM (HCC): Primary | ICD-10-CM

## 2023-10-31 DIAGNOSIS — R26.2 DIFFICULTY WALKING: ICD-10-CM

## 2023-10-31 DIAGNOSIS — G03.9 MENINGITIS: ICD-10-CM

## 2023-10-31 PROCEDURE — 97530 THERAPEUTIC ACTIVITIES: CPT

## 2023-10-31 PROCEDURE — 97112 NEUROMUSCULAR REEDUCATION: CPT

## 2023-10-31 NOTE — PROGRESS NOTES
Daily Note     Today's date: 10/31/2023  Patient name: Amparo Trevino  : 1970  MRN: [id number]  Referring provider: nAita Vega*  Dx:   Encounter Diagnosis     ICD-10-CM    1. Cerebrovascular accident (CVA), unspecified mechanism ([address])  I63.9       2. Meningitis  G03.9       3. Difficulty walking  R26.2           Start Time: 1325  Stop Time: 1415  Total time in clinic (min): 50 minutes    Subjective: Pt states that he used a cane to walk around the campfire and his leg was sore. Objective: See treatment diary below      Assessment: Pt participated in a skilled PT session focused on balance, gait, and strengthening intervention. Duration as progressed for TM walking and pt was able to tolerate the full time. Uneven surface gait was trialed due to pt attending a campfire this weekend and navigating uneven surface, and pt was able to perform without LOB. Pt ambulated to the car with [facility] today and performed curb transfer with supervision assistance. Pt will continue to benefit from skilled PT intervention focused on balance, gait, and strengthening intervention so that he can navigate his environment safely and with increased independence. Plan: Continue per plan of care. 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 MCTSIB: Feet together EO 30s increased sweay        Feet together EC 11s increased sway     POC Expires:23      Objective IE 10/3 10/9 10/13 10/16 10/20 10/23 10/27 10/31     ABC 26.25%            5xSTS 20.78s no UE            2MWT 168. 3ft            Briseno  36           Manuals                                                                 Neuro Re-Ed             Gait training in SOLO  W SPC 10x 20ft down and back -SPC 8x 40ft supervision    -SPC 8x 40ft 2.5lb AW on L LE intermittent CGA    -Backwards walking with SPC 4x20ft    -side stepping 2lb weight on L LE 8x 20ft     -No AD 8x 40ft supervision with 2.5lb AW on L LE, intermittent CGA    -side stepping with SPC, 2lb weight on L LE 8x 20ft -No AD 8x 40ft with 2.5lb AW on L LE, intermittent CGA 8x 20ft    -side stepping with 2.5lb AW  L LE with no AD -No AD 8 laps 20ft with 3lb AW on L LE     -treadmill speed 1.0mph incline 1% 6 min 3lb AW on L LE -SOLO Treadmill speed 1. 3mph incline 1% 3lb AW on L LE 6 min    -backwards walking 6x20ft no AD in SOLO -SOLO treadmill speed 1. 3mph incline 1% 3lb AW on L LE 6 min     Hurdles   In SOLO 4 hurdles lowest setting 6x with SPC  In SOLO 4 hurdles lowest setting no AD 2.5lb AW In SOLO 6 hurdles middle setting no AD 2.5lb AW -In SOLO 6 hurdles middle setting no AD 3lb AW -In SOLO 6 hurdles middle setting no AD 3lb AW -In SOLO 6 hurdles middle setting no AD 3lb AW     Uneven surface gait        Uneven mat with AW underneath 6x full length of mat. Static balance  Feet together EC 2x30"  FT EC 3x1 min FT EC 3x1 min         Tandem gait  2x5ft with intermittent modA from PT           Testing  GARZA           Dynamic Balance   -standing step taps with SPC, 2x10 bilaterally in SOLO  -standing cone taps in SOLO 20x/LE    -Backwards walking 6x20ft  -Standing Cone taps in SOLO 20x/LE -standing cone taps in SOLO 20x/LE     Step ups      1x8 leading with R LE lowering with L LE. Initial modA, improved to CGA by conclusion of session 2x10 leading with R LE lowering with L LE with SPC to prepare for weaning off RW. 2x10 middle setting 2x10 leading with R LE lowering with L LE with SPC middle setting. 3lb AW.       Ther Ex                                                                                                                     Ther Activity             Skin inspection  Of shunt and area surrounding, found increased errhythmia           Curb transfer With [facility] supervision assistance     AD Training        With [facility] around clinic and to car     Gait Training                                       Modalities

## 2023-11-02 ENCOUNTER — EVALUATION (OUTPATIENT)
Facility: CLINIC | Age: 53
End: 2023-11-02
Payer: COMMERCIAL

## 2023-11-02 ENCOUNTER — PATIENT OUTREACH (OUTPATIENT)
Dept: FAMILY MEDICINE CLINIC | Facility: CLINIC | Age: 53
End: 2023-11-02

## 2023-11-02 DIAGNOSIS — R26.2 DIFFICULTY WALKING: ICD-10-CM

## 2023-11-02 DIAGNOSIS — G03.9 MENINGITIS: ICD-10-CM

## 2023-11-02 DIAGNOSIS — I63.9 CEREBROVASCULAR ACCIDENT (CVA), UNSPECIFIED MECHANISM (HCC): Primary | ICD-10-CM

## 2023-11-02 PROCEDURE — 97164 PT RE-EVAL EST PLAN CARE: CPT

## 2023-11-02 NOTE — PROGRESS NOTES
KINGS Ellis attempted to call patient a second time ([phone]) to discuss progress made with establishing with an OP [id number] [place] [address] provider. Patient did not answer, KINGS ELLIS left a message including KINGS ELLIS contact information and requested a call back. KINGS ELLIS will send unable to reach letter via [employer] and await two weeks for a response prior to closing.

## 2023-11-02 NOTE — PROGRESS NOTES
PT Re-Evaluation     Today's date: 2023  Patient name: Daniella Molina  : 1970  MRN: [id number]  Referring provider: Agnieszka Sanchez*  Dx:   Encounter Diagnosis     ICD-10-CM    1. Cerebrovascular accident (CVA), unspecified mechanism ([address])  I63.9       2. Meningitis  G03.9       3. Difficulty walking  R26.2           Start Time: 1100  Stop Time: 1145  Total time in clinic (min): 45 minutes    Assessment  Assessment details: 23: Pt is a 48 y.o. male who has received 9 visits of skilled PT intervention focused on balance, gait, and strengthening intervention. Today's assessment reflects significant improvement in gait speed and 5xSTS scores, reflecting improvement in LE power. Furthermore, 2MWT w RW was progressed to 6MWT with SPC, reflecting improvements in endurance. GARZA increased from 36 to 46, reflecting improved balance. Despite this, pt still scores at risk for falls and is a limited community ambulator. Pt is limited in his progression by his LBP around his shunt, which he is trying to get addressed by his neurosurgeon, however has been unsuccessful in getting a response from him. Pt will continue to benefit from skilled PT intervention focused on balance, gait, and strengthening intervention so that he can navigate his environment safely and without pain. At IE: Pt is a 48 y.o. 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 Today's assessment reflects elevated falls risk and reduced CV stamina as indicated by 2MWT and ABC scoring. 5xSTS and MMTs reflect reduced LE strength and power, which contribute to pt's feeling of weakness and difficulty navigating stairs. Pt also exhibited coordination impairments which impacts his ability to correct sequence gait. IE examination limited today due to time constraints, but plan to complete additional testing at next visit.  Pt will benefit from skilled physical therapy intervention addressing strength, balance, and gait deficits so that she may be able to navigate his environment safely and with increased independence. Impairments: abnormal coordination, abnormal gait, abnormal muscle firing, abnormal muscle tone, abnormal or restricted ROM, abnormal movement, activity intolerance, difficulty understanding, impaired balance, impaired physical strength, lacks appropriate home exercise program, safety issue, poor posture  and poor body mechanics  Functional limitations: gait, balance, transfers. Understanding of Dx/Px/POC: good   Prognosis: fair    Goals  STG: To be achieved within 4 weeks  1. Pt to be independent with HEP to maximize carry over skilled PT intervention at home  2. Pt to improve 5xSTS score by 2.3s to reflect improvement in hip extensor strength and improve STS transfers  3. Pt to improve gait speed by MCID stroke . 14s so to reflect improvement in gait speed   4. Pt to improve hip flexor, hip abductor, and hip extensor strength to 4/5 MMT to improve recruitment of hip strategy  5. Pt to improve ankle DF strength to 4/5 MMT to reduce instances of foot drag in gait  6. Pt to exhibit proper sequencing with use of AD to improve ambulation and stability during gait  7. Pt to 2MWT by MDC of 40ft to reflect improvement in CV endurance. LT weeks  1. Pt to improve FGA score to >22/30 to reflect reduced risk of falls  2. Pt to improve gait speed to within age matched normative values 1.39m/s at self selected speed to improve safety when crossing the street  3. Pt to improve hip flexor, abductor, and extensor MMT to 5/5 to aid in proper recruitment of hip strategy during gait  4. Pt to improve ankle DF MMT to 5/5 to reduce instances of foot drag during gait. 5. Pt to improve endurance so that 6MWT can be assessed. 6. Pt will be able to improve safety to begin initiation of gait training with SPC.      All data taken from [facility] Section or Rehab Measures, UDPT Normative Values sheet    GARZA test: 46/56                                                    5 x STS Test:  MDC: 6 points                                                                       MDC: 2.3 seconds   age norms                                                                           Age Norms   57-79 year old = M: 54, F: 49                                             61-73 year old: 11.4 seconds   73-78 year old = M 47,  F: 49                                             61-73 year old: 12.6 seconds    80-80 year old = M46,   F: 49                                             61-73 year old: 14.8 seconds     TUG test:                                                                     10 Meter Walk Test:  MDC: 4.14 seconds                                                                MDC: .59 ft/sec  Cut off score for Falls                                                  Age Norms  > 13.5 seconds community dwelling adults                20-29; M: 4.56 ft/sec F: 4.62 ft/sec  > 32.2 Frail Elderly                                                     30-39: M 4.76 ft/sec  F: 4.68 ft/sec                                                                                                     40-49: M: 4.79 ft/sec  F: 4.62 ft/sec  6 Minute Walk Test                                                              50-59: M: 4.76 ft/sec  F: 4.56 ft/sec  MDC: 190 feet                                                                        60-69: M: 4.56 ft/sec  F: 4.26 ft/sec  Age Norms                                                                              70-+    M: 4.36 ft/sec  F: 4.16 ft/sec  60-69:    M: 1876 F: 1765  70-79:    M: 1729 F: 1545  80-89 +: M: 2670 F; 1286              4 square step test          Age matched Norms:          -Acute stroke: 20.8 seconds- 17.5 seconds          -Older adults/geriatrics: 32.6 seconds (multiple fallers)/17.6 seconds (non-fallers)          -Parkinson's: On Drug time: 9.6 secs/ Off Drug time: 11 .02secs         MCID: Not established         MDC: Not established         Cut off scores (for falls risk)          -Older adults/Geriatric: > 15 seconds          -Vestibular: > 12 seconds          -Transtibial amputations: >24 seconds at risk for falls          -Acute stroke: failed attempt or > 15 seconds          -Parkinson's disease: < 9.68 seconds      FGA  Age matched Norms  -40-49 years= 28.9/ 50-59 years=28.4  -60-69 years=27.1/70-79 years=24.9  -80/89 years=20.8  MCID: Vestibular disorders: 8 points  MDC: Parkinsons: 0.61, Stroke: 4.2 points  Cut-offs:  -Community dwelling older adults   -22/30 : predict falls (Sensitivity 85%, Specificity 86%)   -20/30 (unexplained falls in the next 6 months) (Sensitivity 100%, Specificity 76%)  -Parkinson's   -5/30 (identify fallers in Parkinson's)        Plan  Plan details:  Therapeutic activities: to address functional deficits by training transfers, gait, and tasks essential for daily functioning  Therapeutic exercise: address strength, ROM, and tissue length deficits   Neuromuscular Reeducation: addresses balance deficits, vestibular impairments, gaze stabilization, oculomotor impairments, coordination, & PNF  Manual Intervention: address joint mobility deficits, soft tissue restrictions, and pain relieving soft tissue mobilization & IASTM provided by the physical therapist.   Patient would benefit from: skilled physical therapy  Planned modality interventions: electrical stimulation/Russian stimulation, thermotherapy: hydrocollator packs and cryotherapy  Planned therapy interventions: abdominal trunk stabilization, activity modification, balance, balance/weight bearing training, body mechanics training, flexibility, functional ROM exercises, gait training, graded activity, graded exercise, home exercise program, IADL retraining, neuromuscular re-education, motor coordination training, joint mobilization, manual therapy, orthotic fitting/training, orthotic management and training, patient education, postural training, strengthening, stretching, therapeutic activities, therapeutic exercise, therapeutic training, transfer training, wheelchair management and work reintegration  Frequency: 2x week  Duration in visits: 16  Duration in weeks: 8  Plan of Care beginning date: 10/3/2023  Plan of Care expiration date: 11/28/2023  Treatment plan discussed with: patient, caregiver and family      Subjective Evaluation    History of Present Illness  Date of onset: 7/17/2023  Date of surgery: 1/3/2023  Mechanism of injury: 11/2/23: Pt states that he feels about 50% of the way to where he would like to be. He states that he can do anything at his house for himself except for putting the seat in the shower. Specifically it is challenging picking it up and carrying it to the bathroom. Pt states that he feels the most unsteady walking with the cane because he has not done a lot of it, but would like to progress to the cane. At IE: Pt states that he underwent a surgery for chiari malformation on 1/3/2023 when the surgeon accidentally cut his membrane and caused a CSF leak. He then went on to have 15 additional surgeries and two shunts placed, one in his head and one in his back. 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 He was admitted to the hospital on July 10th 6871 due to complications with the shunts. When he was in the hospital, he suffered two strokes on July 17th. Testing revealed meningitis and ventriculitis, which they feel caused the strokes. He had both a left sided and a right sided stroke. Initially, he was placed on life support due to the severity of the CVAs. However, he improved quickly. He was unable to sit up initially and had no mobility on his left side. He had inpatient rehab for 22 days.  Now, he is using a rollator for short distances, and is able to shower himself with a shower chair and mobility aid to lift his LE. Pt notes that at this time it is still hard for him to  his left foot. He notes a little numbness in his right calf. He has had two falls since the CVA. One occurred in the inpatient facility while using the bedside urinal, and the other one occurred in his home when his RW got stuck in the bathroom. His daughter, Janis Cross, has since moved home with home and is his primary caretaker at this time. She supplied a portion of the subjective today. She supervises him on stairs and brings a WC in case of longer distance travels. 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 He would also like to be able to ambulate without an RW, and be able to walk for longer without limitation. They need to leave a couple minutes before 9:30am today.            Not a recurrent problem   Quality of life: fair    Patient Goals  Patient goals for therapy: increased strength, independence with ADLs/IADLs, return to sport/leisure activities, return to work, improved balance and increased motion    Social Support  Steps to enter house: no  Stairs in house: yes   6  Lives in: multiple-level home  Lives with: adult children    Employment status: not working  Hand dominance: right    Treatments  Discharged from (in last 30 days): home health care        Objective     Strength/Myotome Testing     Left Hip   Planes of Motion   Flexion: 3+    Right Hip   Planes of Motion   Flexion: 4    Left Knee   Extension: 2+    Right Knee   Extension: 3+    Left Ankle/Foot   Dorsiflexion: 2+    Right Ankle/Foot   Dorsiflexion: 3+  Neuro Exam:     Coordination   Heel to shin: right WNL  Heel to shin: left dysmetric  Finger to nose: left WNL and right WNL  Rapid alternating movements: UE impaired and LE impaired     Functional outcomes   5x sit to stand: 20.78s no UE support (seconds)  2 minute walk test: 168. 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 POC Expires:11/28/23      Objective IE 10/3 11/2           ABC 26.25% 51.88%           5xSTS 20.78s no UE 15.52s           6MWT 2MWT 168. 3ft w RW W .3ft, 1 rest break at 3 min           Manuals                                                                 Neuro Re-Ed                                                                                                        Ther Ex                                                                                                                     Ther Activity                                       Gait Training                                       Modalities

## 2023-11-02 NOTE — LETTER
[address] [facility] [address] [phone]    Re: Inez Kyle to Reach   11/2/2023       Dear Rhoda Griffith am a [facility]  and wanted to be certain you had information to contact me should you desire assistance with or have questions about non-medical aspects of your care such as [but not limited to] medical insurance, housing, transportation, material needs, or emergency needs. If I do not have an answer I will assist you in finding the appropriate agency or individual who can help. Please feel free to contact me at [phone] [phone].     Sincerely,         Constantino Morrow, MSW, LSW

## 2023-11-07 ENCOUNTER — APPOINTMENT (OUTPATIENT)
Facility: CLINIC | Age: 53
End: 2023-11-07
Payer: COMMERCIAL

## 2023-11-07 NOTE — PROGRESS NOTES
Daily Note     Today's date: 2023  Patient name: Brain Sherman  : 1970  MRN: [id number]  Referring provider: DANILO Dickey  Dx: No diagnosis found. Subjective: ***      Objective: See treatment diary below      Assessment: Pt participated in a skilled PT session focused on balance, gait, and strengthening intervention. Pt will continue to benefit from skilled PT intervention focused on balance, gait, and strengthening intervention so that he can navigate his environment safely and with increased independence. Plan: Continue per plan of care. 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 POC Expires:23      Objective IE 10/3 11/2 11/7          ABC 26.25% 51.88%           5xSTS 20.78s no UE 15.52s           6MWT 2MWT 168. 3ft w RW W .3ft, 1 rest break at 3 min           Manuals                                                                 Neuro Re-Ed                                                                                                        Ther Ex                                                                                                                     Ther Activity                                       Gait Training                                       Modalities

## 2023-11-08 ENCOUNTER — OFFICE VISIT (OUTPATIENT)
Facility: CLINIC | Age: 53
End: 2023-11-08
Payer: COMMERCIAL

## 2023-11-08 DIAGNOSIS — G03.9 MENINGITIS: ICD-10-CM

## 2023-11-08 DIAGNOSIS — I63.9 CEREBROVASCULAR ACCIDENT (CVA), UNSPECIFIED MECHANISM (HCC): Primary | ICD-10-CM

## 2023-11-08 DIAGNOSIS — R26.2 DIFFICULTY WALKING: ICD-10-CM

## 2023-11-08 PROCEDURE — 97112 NEUROMUSCULAR REEDUCATION: CPT

## 2023-11-08 PROCEDURE — 97530 THERAPEUTIC ACTIVITIES: CPT

## 2023-11-08 NOTE — PROGRESS NOTES
Daily Note     Today's date: 2023  Patient name: Brain Sherman  : 1970  MRN: [id number]  Referring provider: Abi Dickey*  Dx:   Encounter Diagnosis     ICD-10-CM    1. Cerebrovascular accident (CVA), unspecified mechanism ([address])  I63.9       2. Meningitis  G03.9       3. Difficulty walking  R26.2                      Subjective: Purchased NBQC since  at recommendation of PT. Has been using for most of mobility now. Objective: See treatment diary below      Assessment: Tolerated treatment well. Progressed program to include lateral stepping over hurdles, walking on foam and stepping up/down on foam incline. Pt demonstrates greatest challenge w/ foam incline today, including need for hip and stepping strategies for balance. Patient demonstrated fatigue post treatment, exhibited good technique with therapeutic exercises, and would benefit from continued PT      Plan: Continue per plan of care. Precautions: CVA, history of meningitis due to Klebsiella pneumoniae, HTN, Chiari Malformation Type 1 and repair, HCC, anxiety & depression, HA's, neurogenic syncope, Craniectomy suboccipital w/ cervical laminectomy/chiari, [place] Allergy, Medical tape Allergy, Lisinopril Allergy.    POC Expires:23      Objective IE 10/3 10/9 10/13 10/16 10/20 10/23 10/27 10/31 11/2 11/8   ABC 26.25%        JH    5xSTS 20.78s no UE        JH    2MWT 168.3ft        JH    Briseno  36       JH    Manuals                                                                 Neuro Re-Ed             Gait training in [address] [facility] 10x 20ft down and back -SPC 8x 40ft supervision    -SPC 8x 40ft 2.5lb AW on L LE intermittent CGA    -Backwards walking with SPC 4x20ft    -side stepping 2lb weight on L LE 8x 20ft     -No AD 8x 40ft supervision with 2.5lb AW on L LE, intermittent CGA    -side stepping with SPC, 2lb weight on L LE 8x 20ft -No AD 8x 40ft with 2.5lb AW on L LE, intermittent CGA 8x 20ft    -side stepping with 2.5lb AW  L LE with no AD -No AD 8 laps 20ft with 3lb AW on L LE     -treadmill speed 1.0mph incline 1% 6 min 3lb AW on L LE -SOLO Treadmill speed 1. 3mph incline 1% 3lb AW on L LE 6 min    -backwards walking 6x20ft no AD in SOLO -SOLO treadmill speed 1. 3mph incline 1% 3lb AW on L LE 6 min -SOLO treadmill speed 1. 3mph incline 1% 3lb AW on L LE 6 min -SOLO treadmill speed 1. 3mph incline 1% 3lb AW on L LE 6 min   Hurdles   In SOLO 4 hurdles lowest setting 6x with SPC  In SOLO 4 hurdles lowest setting no AD 2.5lb AW In SOLO 6 hurdles middle setting no AD 2.5lb AW -In SOLO 6 hurdles middle setting no AD 3lb AW -In SOLO 6 hurdles middle setting no AD 3lb AW -In SOLO 6 hurdles middle setting no AD 3lb AW -In SOLO 6 hurdles middle setting no AD 3lb AW In SOLO 6 hurdles no AD 3lb AW:   - fwd middle setting x4 laps   -lat low setting x3 laps   Uneven surface gait        Uneven mat with AW underneath 6x full length of mat. -Walking on foam beams (2 next to each other) fwd only x4 laps 3# AW   Static balance  Feet together EC 2x30"  FT EC 3x1 min FT EC 3x1 min         Tandem gait  2x5ft with intermittent modA from PT           Testing  GARZA           Dynamic Balance   -standing step taps with SPC, 2x10 bilaterally in SOLO  -standing cone taps in SOLO 20x/LE    -Backwards walking 6x20ft  -Standing Cone taps in SOLO 20x/LE -standing cone taps in SOLO 20x/LE     Step ups      1x8 leading with R LE lowering with L LE. Initial modA, improved to CGA by conclusion of session 2x10 leading with R LE lowering with L LE with SPC to prepare for weaning off RW. 2x10 middle setting 2x10 leading with R LE lowering with L LE with SPC middle setting. 3lb AW.    - up and down foam incline  LLE leading x5 3# AW   Ther Ex                                                                                                                     Ther Activity             Skin inspection  Of shunt and area surrounding, found increased errhythmia Marcelb transfer        With [facility] supervision assistance     AD Training        With [facility] around clinic and to car     Gait Training                                       Modalities

## 2023-11-10 ENCOUNTER — OFFICE VISIT (OUTPATIENT)
Facility: CLINIC | Age: 53
End: 2023-11-10
Payer: COMMERCIAL

## 2023-11-10 DIAGNOSIS — R26.2 DIFFICULTY WALKING: ICD-10-CM

## 2023-11-10 DIAGNOSIS — I63.9 CEREBROVASCULAR ACCIDENT (CVA), UNSPECIFIED MECHANISM (HCC): Primary | ICD-10-CM

## 2023-11-10 DIAGNOSIS — G03.9 MENINGITIS: ICD-10-CM

## 2023-11-10 PROCEDURE — 97112 NEUROMUSCULAR REEDUCATION: CPT

## 2023-11-10 NOTE — PROGRESS NOTES
Daily Note     Today's date: 11/10/2023  Patient name: Gt Mak  : 1970  MRN: [id number]  Referring provider: Scott Anna*  Dx:   Encounter Diagnosis     ICD-10-CM    1. Cerebrovascular accident (CVA), unspecified mechanism ([address])  I63.9       2. Meningitis  G03.9       3. Difficulty walking  R26.2           Start Time: 1100  Stop Time: 1145  Total time in clinic (min): 45 minutes    Subjective: Pt states that his back is killing him today. He purchased a quad cane and he has been using it exclusively. Objective: See treatment diary below      Assessment: Pt participated in a skilled PT session focused on balance, gait, and strengthening intervention. Due to significant LBP today increased rest breaks were taken throughout session. Pt was educated on using the RW if his LBP is too severe and compromises his safety, pt expressed understanding. Side stepping on foam beam was performed with AW and pt required occasional TC to correct LOB. Pt will continue to benefit from skilled PT intervention focused on balance, gait, and strengthening so that he may be able to navigate his environment with increased safety and independence       Plan: Continue per plan of care. 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    POC Expires:23      Objective IE 10/3 10/9 10/13 10/16 10/20 10/23 10/27 10/31 11/2 11/8 11/10   ABC 26.25%        JH     5xSTS 20.78s no UE        JH     2MWT 168.3ft        JH     Briseno  36       JH     Manuals                                                                      Neuro Re-Ed              Gait training in SOLO  W SPC 10x 20ft down and back -SPC 8x 40ft supervision    -SPC 8x 40ft 2.5lb AW on L LE intermittent CGA    -Backwards walking with SPC 4x20ft    -side stepping 2lb weight on L LE 8x 20ft     -No AD 8x 40ft supervision with 2.5lb AW on L LE, intermittent CGA    -side stepping with SPC, 2lb weight on L LE 8x 20ft -No AD 8x 40ft with 2.5lb AW on L LE, intermittent CGA 8x 20ft    -side stepping with 2.5lb AW  L LE with no AD -No AD 8 laps 20ft with 3lb AW on L LE     -treadmill speed 1.0mph incline 1% 6 min 3lb AW on L LE -SOLO Treadmill speed 1. 3mph incline 1% 3lb AW on L LE 6 min    -backwards walking 6x20ft no AD in SOLO -SOLO treadmill speed 1. 3mph incline 1% 3lb AW on L LE 6 min -SOLO treadmill speed 1. 3mph incline 1% 3lb AW on L LE 6 min -SOLO treadmill speed 1. 3mph incline 1% 3lb AW on L LE 6 min -SOLO treadmill speed 1. 3mph incline 2.0% 3lb AW on L LE 8 min   Hurdles   In SOLO 4 hurdles lowest setting 6x with SPC  In SOLO 4 hurdles lowest setting no AD 2.5lb AW In SOLO 6 hurdles middle setting no AD 2.5lb AW -In SOLO 6 hurdles middle setting no AD 3lb AW -In SOLO 6 hurdles middle setting no AD 3lb AW -In SOLO 6 hurdles middle setting no AD 3lb AW -In SOLO 6 hurdles middle setting no AD 3lb AW In SOLO 6 hurdles no AD 3lb AW:   - fwd middle setting x4 laps   -lat low setting x3 laps In SOLO 6 hurdles no AD 3lb AW    -fwd middle setting x4 laps with 3lb AW  -lat low setting x3 laps  with 3 LB AW   Uneven surface gait        Uneven mat with AW underneath 6x full length of mat.    -Walking on foam beams (2 next to each other) fwd only x4 laps 3# AW -side stepping on foam beams x10ft with 3lb AW x5 laps   Static balance  Feet together EC 2x30"  FT EC 3x1 min FT EC 3x1 min          Tandem gait  2x5ft with intermittent modA from PT            Testing  GARZA            Dynamic Balance   -standing step taps with SPC, 2x10 bilaterally in SOLO  -standing cone taps in SOLO 20x/LE    -Backwards walking 6x20ft  -Standing Cone taps in SOLO 20x/LE -standing cone taps in SOLO 20x/LE      Step ups      1x8 leading with R JAIRO lowering with L LE. Initial modA, improved to CGA by conclusion of session 2x10 leading with R LE lowering with L LE with SPC to prepare for weaning off RW. 2x10 middle setting 2x10 leading with R LE lowering with L LE with SPC middle setting. 3lb AW.    - up and down foam incline  LLE leading x5 3# AW -up and down highest setting on step with QC, occasional cueing needed for sequencing 2x10 with 3lb AW   Ther Ex                                                                                                                              Ther Activity              Skin inspection  Of shunt and area surrounding, found increased errhythmia            Curb transfer        With [facility] supervision assistance      AD Training        With [facility] around clinic and to car      Gait Training                                          Modalities

## 2023-11-14 ENCOUNTER — OFFICE VISIT (OUTPATIENT)
Facility: CLINIC | Age: 53
End: 2023-11-14
Payer: COMMERCIAL

## 2023-11-14 DIAGNOSIS — I63.9 CEREBROVASCULAR ACCIDENT (CVA), UNSPECIFIED MECHANISM (HCC): Primary | ICD-10-CM

## 2023-11-14 DIAGNOSIS — G03.9 MENINGITIS: ICD-10-CM

## 2023-11-14 DIAGNOSIS — R26.2 DIFFICULTY WALKING: ICD-10-CM

## 2023-11-14 PROCEDURE — 97112 NEUROMUSCULAR REEDUCATION: CPT

## 2023-11-14 NOTE — PROGRESS NOTES
Daily Note     Today's date: 2023  Patient name: Zoey Cotto  : 1970  MRN: [id number]  Referring provider: Amrita Santo*  Dx:   Encounter Diagnosis     ICD-10-CM    1. Cerebrovascular accident (CVA), unspecified mechanism ([address])  I63.9       2. Meningitis  G03.9       3. Difficulty walking  R26.2           Start Time: 1100  Stop Time: 1145  Total time in clinic (min): 45 minutes    Subjective: Pt states that he drove himself here today and no difficulty getting into or out of his car. No new falls to report. He notes that a stitch fell out of his back the other day. Still noting excessive LBP      Objective: See treatment diary below      Assessment: Pt participated in a skilled PT session focused on balance, gait, and strengthening intervention. Hurdles progressed to highest setting and pt tolerated without pain. Side stepping on foam performed and pt exhibited improved success with maintaining balance today. Reduced level of pain noted on TM today. Uneven surface navigation over mat without AD utilized and pt exhibited 0 LOBs, able to perform with supervision assistance. Pt will continue to benefit from skilled PT intervention so that he may be able to navigate his environment safely and with increased stability. Plan: Continue per plan of care. 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 POC Expires:23      Objective IE 10/3 10/9 10/13 10/16 10/20 10/23 10/27 10/31 11/2 11/8 11/10 11/14   ABC 26.25%        51.88%       5xSTS 20.78s no UE        15.52s       6MWT 2MWT 168. 3ft  w RW        W .3ft, 1 rest break at 3 min       Briseno  36       46      Manuals Neuro Re-Ed               Gait training in [facility] 10x 20ft down and back -SPC 8x 40ft supervision    -SPC 8x 40ft 2.5lb AW on L LE intermittent CGA    -Backwards walking with SPC 4x20ft    -side stepping 2lb weight on L LE 8x 20ft     -No AD 8x 40ft supervision with 2.5lb AW on L LE, intermittent CGA    -side stepping with SPC, 2lb weight on L LE 8x 20ft -No AD 8x 40ft with 2.5lb AW on L LE, intermittent CGA 8x 20ft    -side stepping with 2.5lb AW  L LE with no AD -No AD 8 laps 20ft with 3lb AW on L LE     -treadmill speed 1.0mph incline 1% 6 min 3lb AW on L LE -SOLO Treadmill speed 1. 3mph incline 1% 3lb AW on L LE 6 min    -backwards walking 6x20ft no AD in SOLO -SOLO treadmill speed 1. 3mph incline 1% 3lb AW on L LE 6 min -SOLO treadmill speed 1. 3mph incline 1% 3lb AW on L LE 6 min -SOLO treadmill speed 1. 3mph incline 1% 3lb AW on L LE 6 min -SOLO treadmill speed 1. 3mph incline 2.0% 3lb AW on L LE 8 min -SOLO treadmill speed 1. 3mph incline 2.0% 3lb AW on L LE 8 min   Hurdles   In SOLO 4 hurdles lowest setting 6x with SPC  In SOLO 4 hurdles lowest setting no AD 2.5lb AW In SOLO 6 hurdles middle setting no AD 2.5lb AW -In SOLO 6 hurdles middle setting no AD 3lb AW -In SOLO 6 hurdles middle setting no AD 3lb AW -In SOLO 6 hurdles middle setting no AD 3lb AW -In SOLO 6 hurdles middle setting no AD 3lb AW In SOLO 6 hurdles no AD 3lb AW:   - fwd middle setting x4 laps   -lat low setting x3 laps In SOLO 6 hurdles no AD 3lb AW    -fwd middle setting x4 laps with 3lb AW  -lat low setting x3 laps  with 3 LB AW In SOLO 6 hurdles no AD 3lb AW, fwd highest setting x4 laps with 3lb AW    -side stepping middle setting x4 laps with 3lb AW   Uneven surface gait        Uneven mat with AW underneath 6x full length of mat.    -Walking on foam beams (2 next to each other) fwd only x4 laps 3# AW -side stepping on foam beams x10ft with 3lb AW x5 laps -side stepping on foam beams x10ft with 3lb AW x5 laps    -uneven surface mat with AW underneath 6x length of mat no AD   Static balance  Feet together EC 2x30"  FT EC 3x1 min FT EC 3x1 min           Tandem gait  2x5ft with intermittent modA from PT             Testing  GARZA             Dynamic Balance   -standing step taps with SPC, 2x10 bilaterally in SOLO  -standing cone taps in SOLO 20x/LE    -Backwards walking 6x20ft  -Standing Cone taps in SOLO 20x/LE -standing cone taps in SOLO 20x/LE       Step ups      1x8 leading with R LE lowering with L LE. Initial modA, improved to CGA by conclusion of session 2x10 leading with R LE lowering with L LE with SPC to prepare for weaning off RW. 2x10 middle setting 2x10 leading with R LE lowering with L LE with SPC middle setting. 3lb AW.    - up and down foam incline  LLE leading x5 3# AW -up and down highest setting on step with QC, occasional cueing needed for sequencing 2x10 with 3lb AW -up and down highest setting on step with QC occasional cueing needed for sequencing 2x10 with 3lb AW   Ther Ex                                                                                                                                       Ther Activity               Skin inspection  Of shunt and area surrounding, found increased errhythmia             Curb transfer        With [facility] supervision assistance       AD Training        With [facility] around clinic and to car       Gait Training                                             Modalities

## 2023-11-16 ENCOUNTER — APPOINTMENT (OUTPATIENT)
Facility: CLINIC | Age: 53
End: 2023-11-16
Payer: COMMERCIAL

## 2023-11-16 ENCOUNTER — PATIENT OUTREACH (OUTPATIENT)
Dept: FAMILY MEDICINE CLINIC | Facility: CLINIC | Age: 53
End: 2023-11-16

## 2023-11-16 NOTE — PROGRESS NOTES
[facility] received an inbasket reminder to follow up with patient as an unable to reach letter was sent two weeks ago to patient via [employer]. At this time, [facility] has not received a call back from patient. [facility] will close. Please re consult [facility] as needed.

## 2023-11-16 NOTE — PROGRESS NOTES
Daily Note     Today's date: 2023  Patient name: Liudmila Umanzor  : 1970  MRN: [id number]  Referring provider: LAITH Roblero*  Dx: No diagnosis found. Subjective: ***      Objective: See treatment diary below      Assessment: Pt participated in a skilled PT session focused on balance, gait, and strengthening intervention. Pt will continue to benefit from skilled PT focused on balance, gait, and strengthening intervention so that he can navigate his environment with increased safety and independence. Plan: Continue per plan of care. 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 POC Expires:23      Objective IE 10/3 10/9 10/13 10/16 10/20 10/23 10/27 10/31 11/2 11/8 11/10 11/14 11/16   ABC 26.25%        51.88%        5xSTS 20.78s no UE        15.52s        6MWT 2MWT 168. 3ft  w RW        W .3ft, 1 rest break at 3 min        Briseno  36       55       Manuals                                                                                Neuro Re-Ed                Gait training in SOLO  W SPC 10x 20ft down and back -SPC 8x 40ft supervision    -SPC 8x 40ft 2.5lb AW on L LE intermittent CGA    -Backwards walking with SPC 4x20ft    -side stepping 2lb weight on L LE 8x 20ft     -No AD 8x 40ft supervision with 2.5lb AW on L LE, intermittent CGA    -side stepping with SPC, 2lb weight on L LE 8x 20ft -No AD 8x 40ft with 2.5lb AW on L LE, intermittent CGA 8x 20ft    -side stepping with 2.5lb AW  L LE with no AD -No AD 8 laps 20ft with 3lb AW on L LE     -treadmill speed 1.0mph incline 1% 6 min 3lb AW on L LE -SOLO Treadmill speed 1. 3mph incline 1% 3lb AW on L LE 6 min    -backwards walking 6x20ft no AD in SOLO -SOLO treadmill speed 1.3mph incline 1% 3lb AW on L LE 6 min -SOLO treadmill speed 1. 3mph incline 1% 3lb AW on L LE 6 min -SOLO treadmill speed 1. 3mph incline 1% 3lb AW on L LE 6 min -SOLO treadmill speed 1. 3mph incline 2.0% 3lb AW on L LE 8 min -SOLO treadmill speed 1. 3mph incline 2.0% 3lb AW on L LE 8 min -SOLO treadmill speed 1.5 mph incline 2.0% 3lb AW on L LE 8 min   Hurdles   In SOLO 4 hurdles lowest setting 6x with SPC  In SOLO 4 hurdles lowest setting no AD 2.5lb AW In SOLO 6 hurdles middle setting no AD 2.5lb AW -In SOLO 6 hurdles middle setting no AD 3lb AW -In SOLO 6 hurdles middle setting no AD 3lb AW -In SOLO 6 hurdles middle setting no AD 3lb AW -In SOLO 6 hurdles middle setting no AD 3lb AW In SOLO 6 hurdles no AD 3lb AW:   - fwd middle setting x4 laps   -lat low setting x3 laps In SOLO 6 hurdles no AD 3lb AW    -fwd middle setting x4 laps with 3lb AW  -lat low setting x3 laps  with 3 LB AW In SOLO 6 hurdles no AD 3lb AW, fwd highest setting x4 laps with 3lb AW    -side stepping middle setting x4 laps with 3lb AW In SOLO 6 hurdles no AD 3lb AW fwd highes t setting x4 laps with 3lb AW    -Side stepping middle setting x4 laps with 3lb AW   Uneven surface gait        Uneven mat with AW underneath 6x full length of mat.    -Walking on foam beams (2 next to each other) fwd only x4 laps 3# AW -side stepping on foam beams x10ft with 3lb AW x5 laps -side stepping on foam beams x10ft with 3lb AW x5 laps    -uneven surface mat with AW underneath 6x length of mat no AD -side stepping on foam beams x10ft with 3lb Awx5 laps    -uneven surface mat with AW underneath 6x length of mat no AD   Static balance  Feet together EC 2x30"  FT EC 3x1 min FT EC 3x1 min            Tandem gait  2x5ft with intermittent modA from PT              Testing  GARZA              Dynamic Balance   -standing step taps with SPC, 2x10 bilaterally in SOLO  -standing cone taps in SOLO 20x/LE    -Backwards walking 6x20ft  -Standing Cone taps in SOLO 20x/LE -standing cone taps in SOLO 20x/LE        Step ups      1x8 leading with R LE lowering with L LE. Initial modA, improved to CGA by conclusion of session 2x10 leading with R LE lowering with L LE with SPC to prepare for weaning off RW. 2x10 middle setting 2x10 leading with R LE lowering with L LE with SPC middle setting. 3lb AW.    - up and down foam incline  LLE leading x5 3# AW -up and down highest setting on step with QC, occasional cueing needed for sequencing 2x10 with 3lb AW -up and down highest setting on step with QC occasional cueing needed for sequencing 2x10 with 3lb AW -up and down highest setting on step with QC occasional cueing needed for sequencing 2x10 with 3lb AW   Ther Ex                                                                                                                                                Ther Activity                Skin inspection  Of shunt and area surrounding, found increased errhythmia              Curb transfer        With [facility] supervision assistance        AD Training        With [facility] around clinic and to car        Gait Training                                                Modalities

## 2023-11-20 ENCOUNTER — OFFICE VISIT (OUTPATIENT)
Facility: CLINIC | Age: 53
End: 2023-11-20
Payer: COMMERCIAL

## 2023-11-20 DIAGNOSIS — G03.9 MENINGITIS: ICD-10-CM

## 2023-11-20 DIAGNOSIS — R26.2 DIFFICULTY WALKING: ICD-10-CM

## 2023-11-20 DIAGNOSIS — I63.9 CEREBROVASCULAR ACCIDENT (CVA), UNSPECIFIED MECHANISM (HCC): Primary | ICD-10-CM

## 2023-11-20 PROCEDURE — 97112 NEUROMUSCULAR REEDUCATION: CPT

## 2023-11-20 NOTE — PROGRESS NOTES
Daily Note     Today's date: 2023  Patient name: Will Elder  : 1970  MRN: [id number]  Referring provider: Renate Casas*  Dx:   Encounter Diagnosis     ICD-10-CM    1. Cerebrovascular accident (CVA), unspecified mechanism ([address])  I63.9       2. Meningitis  G03.9       3. Difficulty walking  R26.2           Start Time: 1100  Stop Time: 1145  Total time in clinic (min): 45 minutes    Subjective: Pt could not attend PT last week due to the severity of his LBP. His appointment with his surgeon is coming up this week. Pt reported 1 new fall since he was last here; he had excruciating LBP and his knee buckled as he stepped down a curb. He fell and he couldn't get up. He was down for 10 minutes before getting up. He did not hit his head. Has follow up with neurosurgeon on Wednesday. Objective: See treatment diary below      Assessment: Pt participated in a skilled PT session focused on balance, gait, and strengthening intervention. Pt presented with severe LBP, so AW were held today & increased rest breaks incorporated throughout. Greatest LBP when transitioning from STS or initially upon sitting, describes as "lightening bolt" pain or a "knife twisting into my back." Exhibited improved success with backwards walking however significantly reduced stride length. Pt will continue to benefit from skilled PT focused on balance, gait, and strengthening intervention so that he can navigate his environment with increased safety and independence. Plan: Continue per plan of care. 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    POC Expires:23      Objective IE 10/3 10/9 10/13 10/16 10/20 10/23 10/27 10/31 11/2 11/8 11/10 11/14 11/20   ABC 26.25% 51.88%        5xSTS 20.78s no UE        15.52s        6MWT 2MWT 168. 3ft  w RW        W .3ft, 1 rest break at 3 min        Briseno  36       55       Manuals                                                                                Neuro Re-Ed                Gait training in SOLO  W SPC 10x 20ft down and back -SPC 8x 40ft supervision    -SPC 8x 40ft 2.5lb AW on L LE intermittent CGA    -Backwards walking with SPC 4x20ft    -side stepping 2lb weight on L LE 8x 20ft     -No AD 8x 40ft supervision with 2.5lb AW on L LE, intermittent CGA    -side stepping with SPC, 2lb weight on L LE 8x 20ft -No AD 8x 40ft with 2.5lb AW on L LE, intermittent CGA 8x 20ft    -side stepping with 2.5lb AW  L LE with no AD -No AD 8 laps 20ft with 3lb AW on L LE     -treadmill speed 1.0mph incline 1% 6 min 3lb AW on L LE -SOLO Treadmill speed 1. 3mph incline 1% 3lb AW on L LE 6 min    -backwards walking 6x20ft no AD in SOLO -SOLO treadmill speed 1. 3mph incline 1% 3lb AW on L LE 6 min -SOLO treadmill speed 1. 3mph incline 1% 3lb AW on L LE 6 min -SOLO treadmill speed 1. 3mph incline 1% 3lb AW on L LE 6 min -SOLO treadmill speed 1. 3mph incline 2.0% 3lb AW on L LE 8 min -SOLO treadmill speed 1. 3mph incline 2.0% 3lb AW on L LE 8 min -SOLO treadmill speed 1.5 mph incline 3.0% 6 min   Hurdles   In SOLO 4 hurdles lowest setting 6x with SPC  In SOLO 4 hurdles lowest setting no AD 2.5lb AW In SOLO 6 hurdles middle setting no AD 2.5lb AW -In SOLO 6 hurdles middle setting no AD 3lb AW -In SOLO 6 hurdles middle setting no AD 3lb AW -In SOLO 6 hurdles middle setting no AD 3lb AW -In SOLO 6 hurdles middle setting no AD 3lb AW In SOLO 6 hurdles no AD 3lb AW:   - fwd middle setting x4 laps   -lat low setting x3 laps In SOLO 6 hurdles no AD 3lb AW    -fwd middle setting x4 laps with 3lb AW  -lat low setting x3 laps  with 3 LB AW In SOLO 6 hurdles no AD 3lb AW, fwd highest setting x4 laps with 3lb AW    -side stepping middle setting x4 laps with 3lb AW In SOLO 6 hurdles no AD 3lb AW fwd middle  setting x4 laps with 3lb AW       Uneven surface gait        Uneven mat with AW underneath 6x full length of mat. -Walking on foam beams (2 next to each other) fwd only x4 laps 3# AW -side stepping on foam beams x10ft with 3lb AW x5 laps -side stepping on foam beams x10ft with 3lb AW x5 laps    -uneven surface mat with AW underneath 6x length of mat no AD    Static balance  Feet together EC 2x30"  FT EC 3x1 min FT EC 3x1 min         FT EC 3x1 min   Tandem gait  2x5ft with intermittent modA from PT              Testing  GARZA              Dynamic Balance   -standing step taps with SPC, 2x10 bilaterally in SOLO  -standing cone taps in SOLO 20x/LE    -Backwards walking 6x20ft  -Standing Cone taps in SOLO 20x/LE -standing cone taps in SOLO 20x/LE     Backwards walking 3x40t   Step ups      1x8 leading with R LE lowering with L LE. Initial modA, improved to CGA by conclusion of session 2x10 leading with R LE lowering with L LE with SPC to prepare for weaning off RW. 2x10 middle setting 2x10 leading with R LE lowering with L LE with SPC middle setting. 3lb AW.    - up and down foam incline  LLE leading x5 3# AW -up and down highest setting on step with QC, occasional cueing needed for sequencing 2x10 with 3lb AW -up and down highest setting on step with QC occasional cueing needed for sequencing 2x10 with 3lb AW    Ther Ex                                                                                                                                                Ther Activity                Skin inspection  Of shunt and area surrounding, found increased errhythmia              Curb transfer        With [facility] supervision assistance        AD Training        With [facility] around clinic and to car        Gait Training                                                Modalities

## 2023-11-21 ENCOUNTER — APPOINTMENT (OUTPATIENT)
Facility: CLINIC | Age: 53
End: 2023-11-21
Payer: COMMERCIAL

## 2023-11-22 DIAGNOSIS — F43.23 SITUATIONAL MIXED ANXIETY AND DEPRESSIVE DISORDER: ICD-10-CM

## 2023-11-22 RX ORDER — HYDROXYZINE HYDROCHLORIDE 25 MG/1
TABLET, FILM COATED ORAL
Qty: 30 TABLET | Refills: 0 | Status: SHIPPED | OUTPATIENT
Start: 2023-11-22

## 2023-11-27 ENCOUNTER — OFFICE VISIT (OUTPATIENT)
Facility: CLINIC | Age: 53
End: 2023-11-27
Payer: COMMERCIAL

## 2023-11-27 DIAGNOSIS — I63.9 CEREBROVASCULAR ACCIDENT (CVA), UNSPECIFIED MECHANISM (HCC): Primary | ICD-10-CM

## 2023-11-27 DIAGNOSIS — G03.9 MENINGITIS: ICD-10-CM

## 2023-11-27 DIAGNOSIS — R26.2 DIFFICULTY WALKING: ICD-10-CM

## 2023-11-27 PROCEDURE — 97112 NEUROMUSCULAR REEDUCATION: CPT

## 2023-11-27 NOTE — PROGRESS NOTES
Daily Note     Today's date: 2023  Patient name: Bhavya Trujillo  : 1970  MRN: [id number]  Referring provider: Ivette Chapa*  Dx:   Encounter Diagnosis     ICD-10-CM    1. Cerebrovascular accident (CVA), unspecified mechanism ([address])  I63.9       2. Meningitis  G03.9       3. Difficulty walking  R26.2             Start Time: 1100  Stop Time: 1145  Total time in clinic (min): 45 minutes    Subjective: Pt states that he saw his surgeon about his back pain and update on his status and he ordered an MRI for his back pain. He states that it may be a build up of scar tissue, however, he is unconcerned and states that he is impressed that he has made as much progress as he has. He was given flexeril and a muscle relaxant to help with pain, and he states that they are helping, but they aren't a forever solution. Objective: See treatment diary below      Assessment: Pt participated in a skilled PT session focused on balance, gait, and strengthening intervention. Due to improved p! Complaints resistance was increased again today and pt was able to ambulate increased time on the treadmill compared to last visit. Resisted gait training on the R LE was initiate to improve stance control on the L LE, and pt was able to perform with frequent cueing to maintain long stride length and avoid step-to pattern. Pt also exhibited improved success with hurdles, which were progressed to highest setting by conclusion of session with only 2 errors. Pt will continue to benefit from skilled PT focused on balance, gait, and strengthening intervention so that he can navigate his environment with increased safety and independence. Plan: Continue per plan of care.       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 POC Expires:11/28/23      Objective IE 10/3 10/9 10/13 10/16 10/20 10/23 10/27 10/31 11/2 11/8 11/10 11/14 11/20 11/27   ABC 26.25%        51.88%         5xSTS 20.78s no UE        15.52s         6MWT 2MWT 168. 3ft  w RW        W .3ft, 1 rest break at 3 min         Briseno  36       55        Manuals                                                                                     Neuro Re-Ed                 Gait training in SOLO  W SPC 10x 20ft down and back -SPC 8x 40ft supervision    -SPC 8x 40ft 2.5lb AW on L LE intermittent CGA    -Backwards walking with SPC 4x20ft    -side stepping 2lb weight on L LE 8x 20ft     -No AD 8x 40ft supervision with 2.5lb AW on L LE, intermittent CGA    -side stepping with SPC, 2lb weight on L LE 8x 20ft -No AD 8x 40ft with 2.5lb AW on L LE, intermittent CGA 8x 20ft    -side stepping with 2.5lb AW  L LE with no AD -No AD 8 laps 20ft with 3lb AW on L LE     -treadmill speed 1.0mph incline 1% 6 min 3lb AW on L LE -SOLO Treadmill speed 1. 3mph incline 1% 3lb AW on L LE 6 min    -backwards walking 6x20ft no AD in SOLO -SOLO treadmill speed 1. 3mph incline 1% 3lb AW on L LE 6 min -SOLO treadmill speed 1. 3mph incline 1% 3lb AW on L LE 6 min -SOLO treadmill speed 1. 3mph incline 1% 3lb AW on L LE 6 min -SOLO treadmill speed 1. 3mph incline 2.0% 3lb AW on L LE 8 min -SOLO treadmill speed 1. 3mph incline 2.0% 3lb AW on L LE 8 min -SOLO treadmill speed 1.5 mph incline 3.0% 6 min -SOLO treadmill speed 1. 3mph incline 2.0% with 4# AW 8min   Hurdles   In SOLO 4 hurdles lowest setting 6x with SPC  In SOLO 4 hurdles lowest setting no AD 2.5lb AW In SOLO 6 hurdles middle setting no AD 2.5lb AW -In SOLO 6 hurdles middle setting no AD 3lb AW -In SOLO 6 hurdles middle setting no AD 3lb AW -In SOLO 6 hurdles middle setting no AD 3lb AW -In SOLO 6 hurdles middle setting no AD 3lb AW In SOLO 6 hurdles no AD 3lb AW:   - fwd middle setting x4 laps   -lat low setting x3 laps In SOLO 6 hurdles no AD 3lb AW    -fwd middle setting x4 laps with 3lb AW  -lat low setting x3 laps  with 3 LB AW In SOLO 6 hurdles no AD 3lb AW, fwd highest setting x4 laps with 3lb AW    -side stepping middle setting x4 laps with 3lb AW In SOLO 6 hurdles no AD 3lb AW fwd middle  setting x4 laps with 3lb AW     In SOLO 6 hurdles no AD 4lb AW fwd middle setting 4x, sideways 4x, and forward on highes t setting 2x   Uneven surface gait        Uneven mat with AW underneath 6x full length of mat. -Walking on foam beams (2 next to each other) fwd only x4 laps 3# AW -side stepping on foam beams x10ft with 3lb AW x5 laps -side stepping on foam beams x10ft with 3lb AW x5 laps    -uneven surface mat with AW underneath 6x length of mat no AD  Side stepping on foam beams 2 beams with 4lb AW x 6 laps       Static balance  Feet together EC 2x30"  FT EC 3x1 min FT EC 3x1 min         FT EC 3x1 min FT EC 4x1 min on foam   Tandem gait  2x5ft with intermittent modA from PT               Testing  GARZA               Dynamic Balance   -standing step taps with SPC, 2x10 bilaterally in SOLO  -standing cone taps in SOLO 20x/LE    -Backwards walking 6x20ft  -Standing Cone taps in SOLO 20x/LE -standing cone taps in SOLO 20x/LE     Backwards walking 3x40t    Resisted gait              Pink t-band resistance to R LE, 4lb AW L LE, with QC. 20ft 6x to resist during stance. Step ups      1x8 leading with R LE lowering with L LE. Initial modA, improved to CGA by conclusion of session 2x10 leading with R LE lowering with L LE with SPC to prepare for weaning off RW. 2x10 middle setting 2x10 leading with R LE lowering with L LE with SPC middle setting. 3lb AW.    - up and down foam incline  LLE leading x5 3# AW -up and down highest setting on step with QC, occasional cueing needed for sequencing 2x10 with 3lb AW -up and down highest setting on step with QC occasional cueing needed for sequencing 2x10 with 3lb AW     Ther Ex Ther Activity                 Skin inspection  Of shunt and area surrounding, found increased errhythmia               Curb transfer        With [facility] supervision assistance         AD Training        With [facility] around clinic and to car         Gait Training                                                   Modalities

## 2023-11-29 ENCOUNTER — OFFICE VISIT (OUTPATIENT)
Facility: CLINIC | Age: 53
End: 2023-11-29
Payer: COMMERCIAL

## 2023-11-29 DIAGNOSIS — I63.9 CEREBROVASCULAR ACCIDENT (CVA), UNSPECIFIED MECHANISM (HCC): Primary | ICD-10-CM

## 2023-11-29 DIAGNOSIS — R26.2 DIFFICULTY WALKING: ICD-10-CM

## 2023-11-29 DIAGNOSIS — G03.9 MENINGITIS: ICD-10-CM

## 2023-11-29 PROCEDURE — 97112 NEUROMUSCULAR REEDUCATION: CPT

## 2023-11-29 NOTE — PROGRESS NOTES
PT Re-Evaluation     Today's date: 2023  Patient name: Alejandro Mejia  : 1970  MRN: [id number]  Referring provider: Delcia Goodell, Landis Sprague*  Dx:   Encounter Diagnosis     ICD-10-CM    1. Cerebrovascular accident (CVA), unspecified mechanism ([address])  I63.9       2. Meningitis  G03.9       3. Difficulty walking  R26.2           Start Time: 1100  Stop Time: 1145  Total time in clinic (min): 45 minutes    Assessment  Assessment details: 23: Pt is a 48 y.o. male who has received 15 visits of skilled PT intervention focused on balance, gait, and strengthening intervention. Today's visit reflects significant improvement in pt's gait and independence with ambulation. He is now able to utilize the cane full time. However, pt's gait still reflects decreased stance time on the left LE and reduced swing on the left, which increases his risk of falls as he occasionally catches his foot. Pt will continue to require ongoing PT intervention so that he can navigate his environment with increased safety and stability. 23: Pt is a 48 y.o. male who has received 9 visits of skilled PT intervention focused on balance, gait, and strengthening intervention. Today's assessment reflects significant improvement in gait speed and 5xSTS scores, reflecting improvement in LE power. Furthermore, 2MWT w RW was progressed to 6MWT with SPC, reflecting improvements in endurance. GARZA increased from 36 to 46, reflecting improved balance. Despite this, pt still scores at risk for falls and is a limited community ambulator. Pt is limited in his progression by his LBP around his shunt, which he is trying to get addressed by his neurosurgeon, however has been unsuccessful in getting a response from him. Pt will continue to benefit from skilled PT intervention focused on balance, gait, and strengthening intervention so that he can navigate his environment safely and without pain.      At IE: Pt is a 48 y.o. 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 Today's assessment reflects elevated falls risk and reduced CV stamina as indicated by 2MWT and ABC scoring. 5xSTS and MMTs reflect reduced LE strength and power, which contribute to pt's feeling of weakness and difficulty navigating stairs. Pt also exhibited coordination impairments which impacts his ability to correct sequence gait. IE examination limited today due to time constraints, but plan to complete additional testing at next visit. Pt will benefit from skilled physical therapy intervention addressing strength, balance, and gait deficits so that she may be able to navigate his environment safely and with increased independence. Impairments: abnormal coordination, abnormal gait, abnormal muscle firing, abnormal muscle tone, abnormal or restricted ROM, abnormal movement, activity intolerance, difficulty understanding, impaired balance, impaired physical strength, lacks appropriate home exercise program, safety issue, poor posture  and poor body mechanics  Functional limitations: gait, balance, transfers. Understanding of Dx/Px/POC: good   Prognosis: fair    Goals  STG: To be achieved within 4 weeks  1. Pt to be independent with HEP to maximize carry over skilled PT intervention at home  2. Pt to improve 5xSTS score by 2.3s to reflect improvement in hip extensor strength and improve STS transfers  3. Pt to improve gait speed by MCID stroke . 14s so to reflect improvement in gait speed   4. Pt to improve hip flexor, hip abductor, and hip extensor strength to 4/5 MMT to improve recruitment of hip strategy  5. Pt to improve ankle DF strength to 4/5 MMT to reduce instances of foot drag in gait  6. Pt to exhibit proper sequencing with use of AD to improve ambulation and stability during gait  7. Pt to 2MWT by MDC of 40ft to reflect improvement in CV endurance.      LT weeks  1. Pt to improve FGA score to >22/30 to reflect reduced risk of falls  2. Pt to improve gait speed to within age matched normative values 1.39m/s at self selected speed to improve safety when crossing the street  3. Pt to improve hip flexor, abductor, and extensor MMT to 5/5 to aid in proper recruitment of hip strategy during gait  4. Pt to improve ankle DF MMT to 5/5 to reduce instances of foot drag during gait. 5. Pt to improve endurance so that 6MWT can be assessed. 6. Pt will be able to improve safety to begin initiation of gait training with SPC.      All data taken from [facility] Neuro Section or Rehab Measures, UDPT Normative Values sheet    GARZA test: 46/56                                                    5 x STS Test:  MDC: 6 points                                                                       MDC: 2.3 seconds   age norms                                                                           Age Norms   57-79 year old = M: 54, F: 54                                             57-79 year old: 11.4 seconds   73-78 year old = M 47,  F: 51                                             72-69 year old: 12.6 seconds    80-80 year old = M46,   F: 51                                             72-69 year old: 14.8 seconds     TUG test:                                                                     10 Meter Walk Test:  MDC: 4.14 seconds                                                                MDC: .59 ft/sec  Cut off score for Falls                                                  Age Norms  > 13.5 seconds community dwelling adults                20-29; M: 4.56 ft/sec F: 4.62 ft/sec  > 32.2 Frail Elderly                                                     30-39: M 4.76 ft/sec  F: 4.68 ft/sec                                                                                                     40-49: M: 4.79 ft/sec  F: 4.62 ft/sec  6 Minute Walk Test 50-59: M: 4.76 ft/sec  F: 4.56 ft/sec  MDC: 190 feet                                                                        60-69: M: 4.56 ft/sec  F: 4.26 ft/sec  Age Norms                                                                              70-+    M: 4.36 ft/sec  F: 4.16 ft/sec  60-69:    M: 1876 F: 1765  70-79:    M: 80 F: 1545  80-89 +: M: 80 F; 1286              4 square step test          Age matched Norms:          -Acute stroke: 20.8 seconds- 17.5 seconds          -Older adults/geriatrics: 32.6 seconds (multiple fallers)/17.6 seconds (non-fallers)          -Parkinson's: On Drug time: 9.6 secs/ Off Drug time: 11 .02secs         MCID: Not established         MDC: Not established         Cut off scores (for falls risk)          -Older adults/Geriatric: > 15 seconds          -Vestibular: > 12 seconds          -Transtibial amputations: >24 seconds at risk for falls          -Acute stroke: failed attempt or > 15 seconds          -Parkinson's disease: < 9.68 seconds      FGA  Age matched Norms  -40-49 years= 28.9/ 50-59 years=28.4  -60-69 years=27.1/70-79 years=24.9  -80/89 years=20.8  MCID: Vestibular disorders: 8 points  MDC: Parkinsons: 0.61, Stroke: 4.2 points  Cut-offs:  -Community dwelling older adults   -22/30 : predict falls (Sensitivity 85%, Specificity 86%)   -20/30 (unexplained falls in the next 6 months) (Sensitivity 100%, Specificity 76%)  -Parkinson's   -5/30 (identify fallers in Parkinson's)        Plan  Plan details:  Therapeutic activities: to address functional deficits by training transfers, gait, and tasks essential for daily functioning  Therapeutic exercise: address strength, ROM, and tissue length deficits   Neuromuscular Reeducation: addresses balance deficits, vestibular impairments, gaze stabilization, oculomotor impairments, coordination, & PNF  Manual Intervention: address joint mobility deficits, soft tissue restrictions, and pain relieving soft tissue mobilization & IASTM provided by the physical therapist.   Patient would benefit from: skilled physical therapy  Planned modality interventions: electrical stimulation/Russian stimulation, thermotherapy: hydrocollator packs and cryotherapy  Planned therapy interventions: abdominal trunk stabilization, activity modification, balance, balance/weight bearing training, body mechanics training, flexibility, functional ROM exercises, gait training, graded activity, graded exercise, home exercise program, IADL retraining, neuromuscular re-education, motor coordination training, joint mobilization, manual therapy, orthotic fitting/training, orthotic management and training, patient education, postural training, strengthening, stretching, therapeutic activities, therapeutic exercise, therapeutic training, transfer training, wheelchair management and work reintegration  Frequency: 2x week  Duration in visits: 16  Duration in weeks: 8  Plan of Care beginning date: 11/29/2023  Plan of Care expiration date: 1/24/2024  Treatment plan discussed with: patient, caregiver and family      Subjective Evaluation    History of Present Illness  Date of onset: 7/17/2023  Date of surgery: 1/3/2023  Mechanism of injury: 11/29/23:Pt states he and his surgeon are very impressed by the progress he is making. He states that he still struggles with curb navigation and vehicle navigation (getting into and out of his car). He is now using the quad cane primarily except for when his back is really bad. 11/2/23: Pt states that he feels about 50% of the way to where he would like to be. He states that he can do anything at his house for himself except for putting the seat in the shower. Specifically it is challenging picking it up and carrying it to the bathroom. Pt states that he feels the most unsteady walking with the cane because he has not done a lot of it, but would like to progress to the cane.      At IE: Pt states that he underwent a surgery for chiari malformation on 1/3/2023 when the surgeon accidentally cut his membrane and caused a CSF leak. He then went on to have 15 additional surgeries and two shunts placed, one in his head and one in his back. 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 He was admitted to the hospital on July 10th 7334 due to complications with the shunts. When he was in the hospital, he suffered two strokes on July 17th. Testing revealed meningitis and ventriculitis, which they feel caused the strokes. He had both a left sided and a right sided stroke. Initially, he was placed on life support due to the severity of the CVAs. However, he improved quickly. He was unable to sit up initially and had no mobility on his left side. He had inpatient rehab for 22 days. Now, he is using a rollator for short distances, and is able to shower himself with a shower chair and mobility aid to lift his LE. Pt notes that at this time it is still hard for him to  his left foot. He notes a little numbness in his right calf. He has had two falls since the CVA. One occurred in the inpatient facility while using the bedside urinal, and the other one occurred in his home when his RW got stuck in the bathroom. His daughter, Kelsi Etienne, has since moved home with home and is his primary caretaker at this time. She supplied a portion of the subjective today. She supervises him on stairs and brings a WC in case of longer distance travels. 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 He would also like to be able to ambulate without an RW, and be able to walk for longer without limitation. They need to leave a couple minutes before 9:30am today.            Not a recurrent problem   Quality of life: fair    Patient Goals  Patient goals for therapy: increased strength, independence with ADLs/IADLs, return to sport/leisure activities, return to work, improved balance and increased motion    Social Support  Steps to enter house: no  Stairs in house: yes   6  Lives in: multiple-level home  Lives with: adult children    Employment status: not working  Hand dominance: right    Treatments  Discharged from (in last 30 days): home health care        Objective     Strength/Myotome Testing     Left Hip   Planes of Motion   Flexion: 3+    Right Hip   Planes of Motion   Flexion: 4    Left Knee   Extension: 2+    Right Knee   Extension: 3+    Left Ankle/Foot   Dorsiflexion: 2+    Right Ankle/Foot   Dorsiflexion: 3+  Neuro Exam:     Coordination   Heel to shin: right WNL  Heel to shin: left dysmetric  Finger to nose: left WNL and right WNL  Rapid alternating movements: UE impaired and LE impaired     Functional outcomes   5x sit to stand: 20.78s no UE support (seconds)  2 minute walk test: 168. 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 POC Expires:11/28/23      Objective IE 10/3 10/9 10/13 10/16 10/20 10/23 10/27 10/31 11/2 11/8 11/10 11/14 11/20 11/27 11/29   ABC 26.25%        51.88%          5xSTS 20.78s no UE        15.52s          6MWT 2MWT 168. 3ft  w RW        W .3ft, 1 rest break at 3 min          [place]  36       46         Manuals                                                                                          Neuro Re-Ed                  Gait training in SOLO  W SPC 10x 20ft down and back -SPC 8x 40ft supervision    -SPC 8x 40ft 2.5lb AW on L LE intermittent CGA    -Backwards walking with SPC 4x20ft    -side stepping 2lb weight on L LE 8x 20ft     -No AD 8x 40ft supervision with 2.5lb AW on L LE, intermittent CGA    -side stepping with SPC, 2lb weight on L LE 8x 20ft -No AD 8x 40ft with 2.5lb AW on L LE, intermittent CGA 8x 20ft    -side stepping with 2.5lb AW  L LE with no AD -No AD 8 laps 20ft with 3lb AW on L LE     -treadmill speed 1.0mph incline 1% 6 min 3lb AW on L LE -SOLO Treadmill speed 1. 3mph incline 1% 3lb AW on L LE 6 min    -backwards walking 6x20ft no AD in SOLO -SOLO treadmill speed 1. 3mph incline 1% 3lb AW on L LE 6 min -SOLO treadmill speed 1. 3mph incline 1% 3lb AW on L LE 6 min -SOLO treadmill speed 1. 3mph incline 1% 3lb AW on L LE 6 min -SOLO treadmill speed 1. 3mph incline 2.0% 3lb AW on L LE 8 min -SOLO treadmill speed 1. 3mph incline 2.0% 3lb AW on L LE 8 min -SOLO treadmill speed 1.5 mph incline 3.0% 6 min -SOLO treadmill speed 1. 3mph incline 2.0% with 4# AW 8min -SOLO treadmill speed 1. 5mph incline 2.5% with 5# AW 9 min   Hurdles   In SOLO 4 hurdles lowest setting 6x with SPC  In SOLO 4 hurdles lowest setting no AD 2.5lb AW In SOLO 6 hurdles middle setting no AD 2.5lb AW -In SOLO 6 hurdles middle setting no AD 3lb AW -In SOLO 6 hurdles middle setting no AD 3lb AW -In SOLO 6 hurdles middle setting no AD 3lb AW -In SOLO 6 hurdles middle setting no AD 3lb AW In SOLO 6 hurdles no AD 3lb AW:   - fwd middle setting x4 laps   -lat low setting x3 laps In SOLO 6 hurdles no AD 3lb AW    -fwd middle setting x4 laps with 3lb AW  -lat low setting x3 laps  with 3 LB AW In SOLO 6 hurdles no AD 3lb AW, fwd highest setting x4 laps with 3lb AW    -side stepping middle setting x4 laps with 3lb AW In SOLO 6 hurdles no AD 3lb AW fwd middle  setting x4 laps with 3lb AW     In SOLO 6 hurdles no AD 4lb AW fwd middle setting 4x, sideways 4x, and forward on highes t setting 2x In SOLO 6 hurdles no AD 5lb AW highest setting fwd 6x, 6x sideways. Uneven surface gait        Uneven mat with AW underneath 6x full length of mat.    -Walking on foam beams (2 next to each other) fwd only x4 laps 3# AW -side stepping on foam beams x10ft with 3lb AW x5 laps -side stepping on foam beams x10ft with 3lb AW x5 laps    -uneven surface mat with AW underneath 6x length of mat no AD  Side stepping on foam beams 2 beams with 4lb AW x 6 laps     Side stepping on foam beam with 2 beams 5lb AW x 6laps   Static balance  Feet together EC 2x30"  FT EC 3x1 min FT EC 3x1 min         FT EC 3x1 min FT EC 4x1 min on foam Ft EC 4x1 min on foam   Tandem gait  2x5ft with intermittent modA from PT                Testing  GARZA                Dynamic Balance   -standing step taps with SPC, 2x10 bilaterally in SOLO  -standing cone taps in SOLO 20x/LE    -Backwards walking 6x20ft  -Standing Cone taps in SOLO 20x/LE -standing cone taps in SOLO 20x/LE     Backwards walking 3x40t     Reactive strategy training               Tapping incline board 30x forward 30x backward. Resisted gait              Pink t-band resistance to R LE, 4lb AW L LE, with QC. 20ft 6x to resist during stance. Step ups      1x8 leading with R LE lowering with L LE. Initial modA, improved to CGA by conclusion of session 2x10 leading with R LE lowering with L LE with SPC to prepare for weaning off RW. 2x10 middle setting 2x10 leading with R LE lowering with L LE with SPC middle setting. 3lb AW.    - up and down foam incline  LLE leading x5 3# AW -up and down highest setting on step with QC, occasional cueing needed for sequencing 2x10 with 3lb AW -up and down highest setting on step with QC occasional cueing needed for sequencing 2x10 with 3lb AW      Ther Ex                                                                                                                                                                  Ther Activity                  Skin inspection  Of shunt and area surrounding, found increased errhythmia                Curb transfer        With [facility] supervision assistance          AD Training        With [facility] around clinic and to car          Gait Training                                                      Modalities

## 2023-12-04 ENCOUNTER — EVALUATION (OUTPATIENT)
Facility: CLINIC | Age: 53
End: 2023-12-04
Payer: COMMERCIAL

## 2023-12-04 DIAGNOSIS — R26.2 DIFFICULTY WALKING: ICD-10-CM

## 2023-12-04 DIAGNOSIS — I63.9 CEREBROVASCULAR ACCIDENT (CVA), UNSPECIFIED MECHANISM (HCC): Primary | ICD-10-CM

## 2023-12-04 DIAGNOSIS — G03.9 MENINGITIS: ICD-10-CM

## 2023-12-04 PROCEDURE — 97112 NEUROMUSCULAR REEDUCATION: CPT

## 2023-12-04 NOTE — PROGRESS NOTES
PT Re-Evaluation     Today's date: 2023  Patient name: Duane Tong  : 1970  MRN: [id number]  Referring provider: Arvind Tenorio*  Dx:   Encounter Diagnosis     ICD-10-CM    1. Cerebrovascular accident (CVA), unspecified mechanism ([address])  I63.9       2. Meningitis  G03.9       3. Difficulty walking  R26.2                      Assessment  Assessment details: 2023: Pt is a 48 y.o. male who has received 16 visits of skilled PT intervention focused on balance, gait, and strengthening intervention. Today's assessment reflects statistically significant improvement in LE power, CV fitness, and balance as indicated by improvement in 5xSTS, 6MWT, and GARZA score. FGA added today to assess pt's dynamic balance and pt scored 15/30, reflecting ongoing increased risk of falls. Balance confidence also still reflects increased risk of falls as noted by ABC <67%. Pt notes ongoing difficulty with L LE strength and concerns surrounding buckling, so planning to focus on unilateral strengthening to aid in improving L LE strength and power. Descending stairs also continues to be a challenge as noted by reduced eccentric control lowering with the L LE, and requires ongoing PT so he can navigate the stairs in his home. Pt will continue to benefit from skilled PT intervention focused on balance, gait, and strengthening so that he can navigate his environment safely and with increased stability. 23: Pt is a 48 y.o. male who has received 15 visits of skilled PT intervention focused on balance, gait, and strengthening intervention. Today's visit reflects significant improvement in pt's gait and independence with ambulation. He is now able to utilize the cane full time. However, pt's gait still reflects decreased stance time on the left LE and reduced swing on the left, which increases his risk of falls as he occasionally catches his foot.  Pt will continue to require ongoing PT intervention so that he can navigate his environment with increased safety and stability. 11/2/23: Pt is a 48 y.o. male who has received 9 visits of skilled PT intervention focused on balance, gait, and strengthening intervention. Today's assessment reflects significant improvement in gait speed and 5xSTS scores, reflecting improvement in LE power. Furthermore, 2MWT w RW was progressed to 6MWT with SPC, reflecting improvements in endurance. GARZA increased from 36 to 46, reflecting improved balance. Despite this, pt still scores at risk for falls and is a limited community ambulator. Pt is limited in his progression by his LBP around his shunt, which he is trying to get addressed by his neurosurgeon, however has been unsuccessful in getting a response from him. Pt will continue to benefit from skilled PT intervention focused on balance, gait, and strengthening intervention so that he can navigate his environment safely and without pain. At IE: Pt is a 48 y.o. 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 Today's assessment reflects elevated falls risk and reduced CV stamina as indicated by 2MWT and ABC scoring. 5xSTS and MMTs reflect reduced LE strength and power, which contribute to pt's feeling of weakness and difficulty navigating stairs. Pt also exhibited coordination impairments which impacts his ability to correct sequence gait. IE examination limited today due to time constraints, but plan to complete additional testing at next visit. Pt will benefit from skilled physical therapy intervention addressing strength, balance, and gait deficits so that she may be able to navigate his environment safely and with increased independence.    Impairments: abnormal coordination, abnormal gait, abnormal muscle firing, abnormal muscle tone, abnormal or restricted ROM, abnormal movement, activity intolerance, difficulty understanding, impaired balance, impaired physical strength, lacks appropriate home exercise program, safety issue, poor posture  and poor body mechanics  Functional limitations: gait, balance, transfers. Understanding of Dx/Px/POC: good   Prognosis: fair    Goals  STG: To be achieved within 4 weeks  1. Pt to be independent with HEP to maximize carry over skilled PT intervention at home ONGOING  2. Pt to improve 5xSTS score by 2.3s to reflect improvement in hip extensor strength and improve STS transfers MET  3. Pt to improve gait speed by MCID stroke . 14s so to reflect improvement in gait speed. ONGOING  4. Pt to improve hip flexor, hip abductor, and hip extensor strength to 4/5 MMT to improve recruitment of hip strategy ONGOING  5. Pt to improve ankle DF strength to 4/5 MMT to reduce instances of foot drag in gait ONGOING  6. Pt to exhibit proper sequencing with use of AD to improve ambulation and stability during gait MET  7. Pt to 2MWT by MDC of 40ft to reflect improvement in CV endurance. MET    LT weeks  1. Pt to improve FGA score to >22/30 to reflect reduced risk of falls ONGOING  2. Pt to improve gait speed to within age matched normative values 1.39m/s at self selected speed to improve safety when crossing the street ONGOING  3. Pt to improve hip flexor, abductor, and extensor MMT to 5/5 to aid in proper recruitment of hip strategy during gait ONGOING  4. Pt to improve ankle DF MMT to 5/5 to reduce instances of foot drag during gait. ONGOING  5. Pt to improve endurance so that 6MWT can be assessed. MET  6. Pt will be able to improve safety to begin initiation of gait training with SPC.   MET    All data taken from [facility] Neuro Section or Rehab Measures, UDPT Normative Values sheet    GARZA test: 46/56                                                    5 x STS Test:  MDC: 6 points                                                                       MDC: 2.3 seconds   age norms Age Norms   57-79 year old = M: 54, F: 54                                             57-79 year old: 11.4 seconds   73-78 year old = M 47,  F: 51                                             72-69 year old: 12.6 seconds    80-80 year old = M46,   F: 51                                             72-69 year old: 14.8 seconds     TUG test:                                                                     10 Meter Walk Test:  MDC: 4.14 seconds                                                                MDC: .59 ft/sec  Cut off score for Falls                                                  Age Norms  > 13.5 seconds community dwelling adults                20-29; M: 4.56 ft/sec F: 4.62 ft/sec  > 32.2 Frail Elderly                                                     30-39: M 4.76 ft/sec  F: 4.68 ft/sec                                                                                                     40-49: M: 4.79 ft/sec  F: 4.62 ft/sec  6 Minute Walk Test                                                              50-59: M: 4.76 ft/sec  F: 4.56 ft/sec  MDC: 190 feet                                                                        60-69: M: 4.56 ft/sec  F: 4.26 ft/sec  Age Norms                                                                              70-+    M: 4.36 ft/sec  F: 4.16 ft/sec  60-69:    M: 1876 F: 1765  70-79:    M: 80 F: 1545  80-89 +: M: 80 F; 1286              4 square step test          Age matched Norms:          -Acute stroke: 20.8 seconds- 17.5 seconds          -Older adults/geriatrics: 32.6 seconds (multiple fallers)/17.6 seconds (non-fallers)          -Parkinson's: On Drug time: 9.6 secs/ Off Drug time: 11 .02secs         MCID: Not established         MDC: Not established         Cut off scores (for falls risk)          -Older adults/Geriatric: > 15 seconds          -Vestibular: > 12 seconds          -Transtibial amputations: >24 seconds at risk for falls          -Acute stroke: failed attempt or > 15 seconds          -Parkinson's disease: < 9.68 seconds      FGA  Age matched Norms  -40-49 years= 28.9/ 50-59 years=28.4  -60-69 years=27.1/70-79 years=24.9  -80/89 years=20.8  MCID: Vestibular disorders: 8 points  MDC: Parkinsons: 0.61, Stroke: 4.2 points  Cut-offs:  -Community dwelling older adults   -22/30 : predict falls (Sensitivity 85%, Specificity 86%)   -20/30 (unexplained falls in the next 6 months) (Sensitivity 100%, Specificity 76%)  -Parkinson's   -5/30 (identify fallers in Parkinson's)        Plan  Plan details:  Therapeutic activities: to address functional deficits by training transfers, gait, and tasks essential for daily functioning  Therapeutic exercise: address strength, ROM, and tissue length deficits   Neuromuscular Reeducation: addresses balance deficits, vestibular impairments, gaze stabilization, oculomotor impairments, coordination, & PNF  Manual Intervention: address joint mobility deficits, soft tissue restrictions, and pain relieving soft tissue mobilization & IASTM provided by the physical therapist.   Patient would benefit from: skilled physical therapy  Planned modality interventions: electrical stimulation/Russian stimulation, thermotherapy: hydrocollator packs and cryotherapy  Planned therapy interventions: abdominal trunk stabilization, activity modification, balance, balance/weight bearing training, body mechanics training, flexibility, functional ROM exercises, gait training, graded activity, graded exercise, home exercise program, IADL retraining, neuromuscular re-education, motor coordination training, joint mobilization, manual therapy, orthotic fitting/training, orthotic management and training, patient education, postural training, strengthening, stretching, therapeutic activities, therapeutic exercise, therapeutic training, transfer training, wheelchair management and work reintegration  Frequency: 2x week  Duration in visits: 16  Duration in weeks: 8  Plan of Care beginning date: 11/29/2023  Plan of Care expiration date: 1/24/2024  Treatment plan discussed with: patient, caregiver and family      Subjective Evaluation    History of Present Illness  Date of onset: 7/17/2023  Date of surgery: 1/3/2023  Mechanism of injury: 12/4/2023: Pt states that he feels about 60-70% of the way to where he expects to be when he is done with PT. He states that he feels pretty steady doing that. Stairs have gotten much easier and he feels he can fly up and down them at this point. He still doesn't feel secure when he walks and he still feels his left leg will get out at any time. He can finally move his toes and now he is very excited about that. He doesn't report any other situations where he feels really off balance. His next step is to stand up in the shower. "If I can do that I'll be fine."     11/29/23:Pt states he and his surgeon are very impressed by the progress he is making. He states that he still struggles with curb navigation and vehicle navigation (getting into and out of his car). He is now using the quad cane primarily except for when his back is really bad. 11/2/23: Pt states that he feels about 50% of the way to where he would like to be. He states that he can do anything at his house for himself except for putting the seat in the shower. Specifically it is challenging picking it up and carrying it to the bathroom. Pt states that he feels the most unsteady walking with the cane because he has not done a lot of it, but would like to progress to the cane. At IE: Pt states that he underwent a surgery for chiari malformation on 1/3/2023 when the surgeon accidentally cut his membrane and caused a CSF leak. He then went on to have 15 additional surgeries and two shunts placed, one in his head and one in his back. 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  He was admitted to the hospital on 5861 due to complications with the shunts. When he was in the hospital, he suffered two strokes on . Testing revealed meningitis and ventriculitis, which they feel caused the strokes. He had both a left sided and a right sided stroke. Initially, he was placed on life support due to the severity of the CVAs. However, he improved quickly. He was unable to sit up initially and had no mobility on his left side. He had inpatient rehab for 22 days. Now, he is using a rollator for short distances, and is able to shower himself with a shower chair and mobility aid to lift his LE. Pt notes that at this time it is still hard for him to  his left foot. He notes a little numbness in his right calf. He has had two falls since the CVA. One occurred in the inpatient facility while using the bedside urinal, and the other one occurred in his home when his RW got stuck in the bathroom. His daughter, Arik Lynch, has since moved home with home and is his primary caretaker at this time. She supplied a portion of the subjective today. She supervises him on stairs and brings a WC in case of longer distance travels. 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 He would also like to be able to ambulate without an RW, and be able to walk for longer without limitation. They need to leave a couple minutes before 9:30am today. Not a recurrent problem   Quality of life: fair    Patient Goals  Patient goals for therapy: increased strength, independence with ADLs/IADLs, return to sport/leisure activities, return to work, improved balance and increased motion    Pain  At best pain rating: 3  At worst pain ratin  Location: left leg, back.   Quality: sharp, burning, dull ache and knife-like  Relieving factors: change in position and medications  Aggravating factors: sitting  Progression: improved    Social Support  Steps to enter house: no  Stairs in house: yes   6  Lives in: multiple-level home  Lives with: adult children    Employment status: not working  Hand dominance: right    Treatments  Discharged from (in last 30 days): home health care      Objective     Strength/Myotome Testing     Left Hip   Planes of Motion   Flexion: 3+    Right Hip   Planes of Motion   Flexion: 4    Left Knee   Extension: 2+    Right Knee   Extension: 3+    Left Ankle/Foot   Dorsiflexion: 2+    Right Ankle/Foot   Dorsiflexion: 3+  Neuro Exam:     Coordination   Heel to shin: right WNL  Heel to shin: left dysmetric  Finger to nose: left WNL and right WNL  Rapid alternating movements: UE impaired and LE impaired     Functional outcomes   5x sit to stand: 20.78s no UE support (seconds)  2 minute walk test: 168. 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 POC Expires:11/28/23      Objective IE 10/3 10/9 10/13 10/16 10/20 10/23 10/27 10/31 11/2 11/8 11/10 11/14 11/20 11/27 12/4   ABC 26.25%        51.88%       54.38%   5xSTS 20.78s no UE        15.52s       12.60s   6MWT 2MWT 168. 3ft  w RW        W .3ft, 1 rest break at 3 min       1000ft w SPC no rest break   FGA               15/30   [place]  36       46      50   10mWT               0.49m/s    Manuals                                                                                          Neuro Re-Ed                  Gait training in SOLO  W SPC 10x 20ft down and back -SPC 8x 40ft supervision    -SPC 8x 40ft 2.5lb AW on L LE intermittent CGA    -Backwards walking with SPC 4x20ft    -side stepping 2lb weight on L LE 8x 20ft     -No AD 8x 40ft supervision with 2.5lb AW on L LE, intermittent CGA    -side stepping with SPC, 2lb weight on L LE 8x 20ft -No AD 8x 40ft with 2.5lb AW on L LE, intermittent CGA 8x 20ft    -side stepping with 2.5lb AW  L LE with no AD -No AD 8 laps 20ft with 3lb AW on L LE     -treadmill speed 1.0mph incline 1% 6 min 3lb AW on L LE -SOLO Treadmill speed 1. 3mph incline 1% 3lb AW on L LE 6 min    -backwards walking 6x20ft no AD in SOLO -SOLO treadmill speed 1. 3mph incline 1% 3lb AW on L LE 6 min -SOLO treadmill speed 1. 3mph incline 1% 3lb AW on L LE 6 min -SOLO treadmill speed 1. 3mph incline 1% 3lb AW on L LE 6 min -SOLO treadmill speed 1. 3mph incline 2.0% 3lb AW on L LE 8 min -SOLO treadmill speed 1. 3mph incline 2.0% 3lb AW on L LE 8 min -SOLO treadmill speed 1.5 mph incline 3.0% 6 min -SOLO treadmill speed 1. 3mph incline 2.0% with 4# AW 8min -SOLO treadmill speed 1. 5mph incline 2.5% with 5# AW 9 min   Hurdles   In SOLO 4 hurdles lowest setting 6x with SPC  In SOLO 4 hurdles lowest setting no AD 2.5lb AW In SOLO 6 hurdles middle setting no AD 2.5lb AW -In SOLO 6 hurdles middle setting no AD 3lb AW -In SOLO 6 hurdles middle setting no AD 3lb AW -In SOLO 6 hurdles middle setting no AD 3lb AW -In SOLO 6 hurdles middle setting no AD 3lb AW In SOLO 6 hurdles no AD 3lb AW:   - fwd middle setting x4 laps   -lat low setting x3 laps In SOLO 6 hurdles no AD 3lb AW    -fwd middle setting x4 laps with 3lb AW  -lat low setting x3 laps  with 3 LB AW In SOLO 6 hurdles no AD 3lb AW, fwd highest setting x4 laps with 3lb AW    -side stepping middle setting x4 laps with 3lb AW In SOLO 6 hurdles no AD 3lb AW fwd middle  setting x4 laps with 3lb AW     In SOLO 6 hurdles no AD 4lb AW fwd middle setting 4x, sideways 4x, and forward on highes t setting 2x In SOLO 6 hurdles no AD 5lb AW highest setting fwd 6x, 6x sideways. Uneven surface gait        Uneven mat with AW underneath 6x full length of mat.    -Walking on foam beams (2 next to each other) fwd only x4 laps 3# AW -side stepping on foam beams x10ft with 3lb AW x5 laps -side stepping on foam beams x10ft with 3lb AW x5 laps    -uneven surface mat with AW underneath 6x length of mat no AD  Side stepping on foam beams 2 beams with 4lb AW x 6 laps     Side stepping on foam beam with 2 beams 5lb AW x 6laps   Static balance  Feet together EC 2x30"  FT EC 3x1 min FT EC 3x1 min         FT EC 3x1 min FT EC 4x1 min on foam Ft EC 4x1 min on foam   Tandem gait  2x5ft with intermittent modA from PT                Testing  GARZA                Dynamic Balance   -standing step taps with SPC, 2x10 bilaterally in SOLO  -standing cone taps in SOLO 20x/LE    -Backwards walking 6x20ft  -Standing Cone taps in SOLO 20x/LE -standing cone taps in SOLO 20x/LE     Backwards walking 3x40t     Reactive strategy training               Tapping incline board 30x forward 30x backward. Resisted gait              Pink t-band resistance to R LE, 4lb AW L LE, with QC. 20ft 6x to resist during stance. Tests & measures               FGA, 6MWT, ABC, 10mWT, GARZA, 5xSTS   Patient education               Ongoing strength & balance deficits, importance of unilateral L Strengthening to challenge L LE, ongoing endurance deficits, plan moving forwards. Step ups      1x8 leading with R LE lowering with L LE. Initial modA, improved to CGA by conclusion of session 2x10 leading with R LE lowering with L LE with SPC to prepare for weaning off RW. 2x10 middle setting 2x10 leading with R LE lowering with L LE with SPC middle setting. 3lb AW.    - up and down foam incline  LLE leading x5 3# AW -up and down highest setting on step with QC, occasional cueing needed for sequencing 2x10 with 3lb AW -up and down highest setting on step with QC occasional cueing needed for sequencing 2x10 with 3lb AW      Ther Ex Ther Activity                  Skin inspection  Of shunt and area surrounding, found increased errhythmia                Curb transfer        With [facility] supervision assistance          AD Training        With [facility] around clinic and to car          Gait Training                                                      Modalities

## 2023-12-06 ENCOUNTER — OFFICE VISIT (OUTPATIENT)
Facility: CLINIC | Age: 53
End: 2023-12-06
Payer: COMMERCIAL

## 2023-12-06 DIAGNOSIS — G03.9 MENINGITIS: ICD-10-CM

## 2023-12-06 DIAGNOSIS — R26.2 DIFFICULTY WALKING: ICD-10-CM

## 2023-12-06 DIAGNOSIS — I63.9 CEREBROVASCULAR ACCIDENT (CVA), UNSPECIFIED MECHANISM (HCC): Primary | ICD-10-CM

## 2023-12-06 PROCEDURE — 97112 NEUROMUSCULAR REEDUCATION: CPT

## 2023-12-06 PROCEDURE — 97110 THERAPEUTIC EXERCISES: CPT

## 2023-12-06 NOTE — PROGRESS NOTES
Daily Note     Today's date: 2023  Patient name: Namita Keating  : 1970  MRN: [id number]  Referring provider: Rolando Jasso*  Dx:   Encounter Diagnosis     ICD-10-CM    1. Cerebrovascular accident (CVA), unspecified mechanism ([address])  I63.9       2. Meningitis  G03.9       3. Difficulty walking  R26.2                      Subjective: Pt states he is doing okay, has some LBP. No new falls to report. Objective: See treatment diary below      Assessment: Pt participated in a skilled PT session focused on balance, gait, and strengthening intervention. Treadmill intervention progressed today to no UE support and pt exhibited increased fatigue, requiring frequent rest breaks, as well as required reduced TM speed and incline to maintain balance and walking. Due to reports of feeling significant LE weakness, provided pt with HEP emphasizing quadricep and hip strengthening, and reviewed frequency and dosage. Discussed purchasing ankle weights to carry over benefits of skilled PT at home for LE strengthening and pt said he will look into it. Pt exhibited significant difficulty with staggered STS. Pt will continue to benefit from skilled PT intervention so that he may be able to navigate his environment safely and with improved stability. Plan: Continue per plan of care. 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 POC Expires:23  [facility] Access Code: [email or web address]       Objective IE 10/3 10/13 10/16 10/20 10/23 10/27 10/31 11/2 11/8 11/10 11/14 11/20 11/27 12/4 12/6   ABC 26.25%       51.88%       54.38%    5xSTS 20.78s no UE       15.52s       12.60s    6MWT 2MWT 168. 3ft  w RW       W .3ft, 1 rest break at 3 min       1000ft w SPC no rest break    FGA              15/30    Briseno        46      50    10mWT              0.49m/s     Manuals                                                                                          Neuro Re-Ed                  Gait training in SOLO  -SPC 8x 40ft supervision    -SPC 8x 40ft 2.5lb AW on L LE intermittent CGA    -Backwards walking with SPC 4x20ft    -side stepping 2lb weight on L LE 8x 20ft     -No AD 8x 40ft supervision with 2.5lb AW on L LE, intermittent CGA    -side stepping with SPC, 2lb weight on L LE 8x 20ft -No AD 8x 40ft with 2.5lb AW on L LE, intermittent CGA 8x 20ft    -side stepping with 2.5lb AW  L LE with no AD -No AD 8 laps 20ft with 3lb AW on L LE     -treadmill speed 1.0mph incline 1% 6 min 3lb AW on L LE -SOLO Treadmill speed 1. 3mph incline 1% 3lb AW on L LE 6 min    -backwards walking 6x20ft no AD in SOLO -SOLO treadmill speed 1. 3mph incline 1% 3lb AW on L LE 6 min -SOLO treadmill speed 1. 3mph incline 1% 3lb AW on L LE 6 min -SOLO treadmill speed 1. 3mph incline 1% 3lb AW on L LE 6 min -SOLO treadmill speed 1. 3mph incline 2.0% 3lb AW on L LE 8 min -SOLO treadmill speed 1. 3mph incline 2.0% 3lb AW on L LE 6 min with  -SOLO treadmill speed 1.5 mph incline 3.0% 6 min -SOLO treadmill speed 1. 3mph incline 2.0% with 4# AW 8min -SOLO treadmill speed 1. 5mph incline 2.5% with 5# AW 9 min -SOLO Treadmill no UE support 1. 1mph incline 0%with 5# AW 6 min with frequent rest breaks required   Hurdles    In SOLO 4 hurdles lowest setting no AD 2.5lb AW In SOLO 6 hurdles middle setting no AD 2.5lb AW -In SOLO 6 hurdles middle setting no AD 3lb AW -In SOLO 6 hurdles middle setting no AD 3lb AW -In SOLO 6 hurdles middle setting no AD 3lb AW -In SOLO 6 hurdles middle setting no AD 3lb AW In SOLO 6 hurdles no AD 3lb AW:   - fwd middle setting x4 laps   -lat low setting x3 laps In SOLO 6 hurdles no AD 3lb AW    -fwd middle setting x4 laps with 3lb AW  -lat low setting x3 laps  with 3 LB AW In SOLO 6 hurdles no AD 3lb AW, fwd highest setting x4 laps with 3lb AW    -side stepping middle setting x4 laps with 3lb AW In SOLO 6 hurdles no AD 3lb AW fwd middle  setting x4 laps with 3lb AW     In SOLO 6 hurdles no AD 4lb AW fwd middle setting 4x, sideways 4x, and forward on highes t setting 2x In SOLO 6 hurdles no AD 5lb AW highest setting fwd 6x, 6x sideways. In SOLO 6 hurdles no AD 5lb AW highest setting fwd 6x, 6x sideways   Uneven surface gait       Uneven mat with AW underneath 6x full length of mat. -Walking on foam beams (2 next to each other) fwd only x4 laps 3# AW -side stepping on foam beams x10ft with 3lb AW x5 laps -side stepping on foam beams x10ft with 3lb AW x5 laps    -uneven surface mat with AW underneath 6x length of mat no AD  Side stepping on foam beams 2 beams with 4lb AW x 6 laps     Side stepping on foam beam with 2 beams 5lb AW x 6laps Side stepping on foam with 2 beams 6lb AW x6 laps   Static balance   FT EC 3x1 min FT EC 3x1 min         FT EC 3x1 min FT EC 4x1 min on foam Ft EC 4x1 min on foam    Tandem gait                  Testing                  Dynamic Balance  -standing step taps with SPC, 2x10 bilaterally in SOLO  -standing cone taps in SOLO 20x/LE    -Backwards walking 6x20ft  -Standing Cone taps in SOLO 20x/LE -standing cone taps in SOLO 20x/LE     Backwards walking 3x40t      Reactive strategy training              Tapping incline board 30x forward 30x backward. Resisted gait             Pink t-band resistance to R LE, 4lb AW L LE, with QC. 20ft 6x to resist during stance. Tests & measures              FGA, 6MWT, ABC, 10mWT, GARZA, 5xSTS    Patient education              Ongoing strength & balance deficits, importance of unilateral L Strengthening to challenge L LE, ongoing endurance deficits, plan moving forwards. Step ups     1x8 leading with R LE lowering with L LE.  Initial modA, improved to CGA by conclusion of session 2x10 leading with R LE lowering with L LE with SPC to prepare for weaning off RW. 2x10 middle setting 2x10 leading with R LE lowering with L LE with SPC middle setting. 3lb AW.    - up and down foam incline  LLE leading x5 3# AW -up and down highest setting on step with QC, occasional cueing needed for sequencing 2x10 with 3lb AW -up and down highest setting on step with QC occasional cueing needed for sequencing 2x10 with 3lb AW       Ther Ex                  Split stance STS               3x10 biasing L LE   LAQ               2x10 bilaterally 10lb AW   Heel Raises                  Patient education               Review of updated HEP                                                                            Ther Activity                  Skin inspection                  Curb transfer       With [facility] supervision assistance           AD Training       With [facility] around clinic and to car           Gait Training                                                      Modalities

## 2023-12-11 ENCOUNTER — OFFICE VISIT (OUTPATIENT)
Facility: CLINIC | Age: 53
End: 2023-12-11
Payer: COMMERCIAL

## 2023-12-11 DIAGNOSIS — I63.9 CEREBROVASCULAR ACCIDENT (CVA), UNSPECIFIED MECHANISM (HCC): Primary | ICD-10-CM

## 2023-12-11 DIAGNOSIS — R26.2 DIFFICULTY WALKING: ICD-10-CM

## 2023-12-11 DIAGNOSIS — G03.9 MENINGITIS: ICD-10-CM

## 2023-12-11 PROCEDURE — 97112 NEUROMUSCULAR REEDUCATION: CPT

## 2023-12-11 NOTE — PROGRESS NOTES
Daily Note     Today's date: 2023  Patient name: Lisa Phelan  : 1970  MRN: [id number]  Referring provider: Roberto Capone*  Dx:   Encounter Diagnosis     ICD-10-CM    1. Cerebrovascular accident (CVA), unspecified mechanism ([address])  I63.9       2. Meningitis  G03.9       3. Difficulty walking  R26.2                      Subjective: Pt states that his leg is a little locked up from sitting. Objective: See treatment diary below      Assessment: Pt participated in a skilled PT session focused on balance, gait, and strength training. Pt able to ambulate for 2 min increments today on treadmill for 10 minutes total which is an improvement from previous visits. Side stepping progressed to hurdles over foam and pt performed CGA-occasional [place], able to implement stepping strategy when needed. Pt will continue to benefit from skilled PT intervention so that he may be able to navigate his environment safely and with increased stability. Plan: Continue per plan of care. 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 POC Expires:23  [facility] Access Code: [email or web address]       Objective IE 10/3 10/13 10/16 10/20 10/23 10/27 10/31 11/2 11/8 11/10 11/14 11/20 11/27 12/4 12/6 12/11   ABC 26.25%       51.88%       54.38%     5xSTS 20.78s no UE       15.52s       12.60s     6MWT 2MWT 168. 3ft  w RW       W .3ft, 1 rest break at 3 min       1000ft w SPC no rest break     FGA              15/30     Briseno        46      50     10mWT              0.49m/s      Manuals                                                                                               Neuro Re-Ed                   Gait training in SOLO  -SPC 8x 40ft supervision    -SPC 8x 40ft 2.5lb AW on L LE intermittent CGA    -Backwards walking with SPC 4x20ft    -side stepping 2lb weight on L LE 8x 20ft     -No AD 8x 40ft supervision with 2.5lb AW on L LE, intermittent CGA    -side stepping with SPC, 2lb weight on L LE 8x 20ft -No AD 8x 40ft with 2.5lb AW on L LE, intermittent CGA 8x 20ft    -side stepping with 2.5lb AW  L LE with no AD -No AD 8 laps 20ft with 3lb AW on L LE     -treadmill speed 1.0mph incline 1% 6 min 3lb AW on L LE -SOLO Treadmill speed 1. 3mph incline 1% 3lb AW on L LE 6 min    -backwards walking 6x20ft no AD in SOLO -SOLO treadmill speed 1. 3mph incline 1% 3lb AW on L LE 6 min -SOLO treadmill speed 1. 3mph incline 1% 3lb AW on L LE 6 min -SOLO treadmill speed 1. 3mph incline 1% 3lb AW on L LE 6 min -SOLO treadmill speed 1. 3mph incline 2.0% 3lb AW on L LE 8 min -SOLO treadmill speed 1. 3mph incline 2.0% 3lb AW on L LE 6 min with  -SOLO treadmill speed 1.5 mph incline 3.0% 6 min -SOLO treadmill speed 1. 3mph incline 2.0% with 4# AW 8min -SOLO treadmill speed 1. 5mph incline 2.5% with 5# AW 9 min -SOLO Treadmill no UE support 1. 1mph incline 0%with 5# AW 6 min with frequent rest breaks required -SOLO Treadmill no UE support 1.1 mph incline  0% with 5# AW 10 min frequent rest breaks required every 2.0min   Hurdles    In SOLO 4 hurdles lowest setting no AD 2.5lb AW In SOLO 6 hurdles middle setting no AD 2.5lb AW -In SOLO 6 hurdles middle setting no AD 3lb AW -In SOLO 6 hurdles middle setting no AD 3lb AW -In SOLO 6 hurdles middle setting no AD 3lb AW -In SOLO 6 hurdles middle setting no AD 3lb AW In SOLO 6 hurdles no AD 3lb AW:   - fwd middle setting x4 laps   -lat low setting x3 laps In SOLO 6 hurdles no AD 3lb AW    -fwd middle setting x4 laps with 3lb AW  -lat low setting x3 laps  with 3 LB AW In SOLO 6 hurdles no AD 3lb AW, fwd highest setting x4 laps with 3lb AW    -side stepping middle setting x4 laps with 3lb AW In SOLO 6 hurdles no AD 3lb AW fwd middle  setting x4 laps with 3lb AW     In SOLO 6 hurdles no AD 4lb AW fwd middle setting 4x, sideways 4x, and forward on highes t setting 2x In SOLO 6 hurdles no AD 5lb AW highest setting fwd 6x, 6x sideways. In SOLO 6 hurdles no AD 5lb AW highest setting fwd 6x, 6x sideways In SOLO 6 hurdles no AD 5lb AW highest setting fwd 6x, 6x sideways over foam beam.    Uneven surface gait       Uneven mat with AW underneath 6x full length of mat. -Walking on foam beams (2 next to each other) fwd only x4 laps 3# AW -side stepping on foam beams x10ft with 3lb AW x5 laps -side stepping on foam beams x10ft with 3lb AW x5 laps    -uneven surface mat with AW underneath 6x length of mat no AD  Side stepping on foam beams 2 beams with 4lb AW x 6 laps     Side stepping on foam beam with 2 beams 5lb AW x 6laps Side stepping on foam with 2 beams 6lb AW x6 laps    Static balance   FT EC 3x1 min FT EC 3x1 min         FT EC 3x1 min FT EC 4x1 min on foam Ft EC 4x1 min on foam  FT EC 4x1 min on foam   Tandem gait                   Testing                   Dynamic Balance  -standing step taps with SPC, 2x10 bilaterally in SOLO  -standing cone taps in SOLO 20x/LE    -Backwards walking 6x20ft  -Standing Cone taps in SOLO 20x/LE -standing cone taps in SOLO 20x/LE     Backwards walking 3x40t       Reactive strategy training              Tapping incline board 30x forward 30x backward. Resisted gait             Pink t-band resistance to R LE, 4lb AW L LE, with QC. 20ft 6x to resist during stance. Tests & measures              FGA, 6MWT, ABC, 10mWT, GARZA, 5xSTS     Patient education              Ongoing strength & balance deficits, importance of unilateral L Strengthening to challenge L LE, ongoing endurance deficits, plan moving forwards. Step ups     1x8 leading with R LE lowering with L LE.  Initial modA, improved to CGA by conclusion of session 2x10 leading with R LE lowering with L LE with SPC to prepare for weaning off RW. 2x10 middle setting 2x10 leading with R LE lowering with L LE with SPC middle setting. 3lb AW.    - up and down foam incline  LLE leading x5 3# AW -up and down highest setting on step with QC, occasional cueing needed for sequencing 2x10 with 3lb AW -up and down highest setting on step with QC occasional cueing needed for sequencing 2x10 with 3lb AW        Ther Ex                   Split stance STS               3x10 biasing L LE    LAQ               2x10 bilaterally 10lb AW    Heel Raises                   Patient education               Review of updated HEP                                                                                 Ther Activity                   Skin inspection                   Curb transfer       With [facility] supervision assistance            AD Training       With [facility] around clinic and to car            Gait Training                                                         Modalities

## 2023-12-13 ENCOUNTER — OFFICE VISIT (OUTPATIENT)
Facility: CLINIC | Age: 53
End: 2023-12-13
Payer: COMMERCIAL

## 2023-12-13 DIAGNOSIS — I63.9 CEREBROVASCULAR ACCIDENT (CVA), UNSPECIFIED MECHANISM (HCC): Primary | ICD-10-CM

## 2023-12-13 DIAGNOSIS — R26.2 DIFFICULTY WALKING: ICD-10-CM

## 2023-12-13 DIAGNOSIS — G03.9 MENINGITIS: ICD-10-CM

## 2023-12-13 PROCEDURE — 97112 NEUROMUSCULAR REEDUCATION: CPT

## 2023-12-13 NOTE — PROGRESS NOTES
Daily Note     Today's date: 2023  Patient name: Daniella Molina  : 1970  MRN: [id number]  Referring provider: Agnieszka Sanchez*  Dx:   Encounter Diagnosis     ICD-10-CM    1. Cerebrovascular accident (CVA), unspecified mechanism ([address])  I63.9       2. Meningitis  G03.9       3. Difficulty walking  R26.2           Start Time: 1100  Stop Time: 1145  Total time in clinic (min): 45 minutes    Subjective: Pt states that he is feeling some LBP today, no new falls to report. Objective: See treatment diary below      Assessment:Pt participated in a skilled PT session focused on balance, gait, and strength training. Pt exhibited improved stability with side stepping on foam balance beam over hurdles today as compared to last visit. Pt was able to ambulate longer on TM without UE support before requiring rest breaks. Pt will continue to benefit from skilled PT intervention focused on balance, gait, and strengthening so that he can navigate his environment safely and with increased stability. Plan: Continue per plan of care. 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 POC Expires:23  [place] Access Code: [email or web address]       Objective IE 10/3 10/13 10/16 10/20 10/23 10/27 10/31 11/2 11/8 11/10 11/14 11/20 11/27 12/4 12/6 12/11   ABC 26.25%       51.88%       54.38%     5xSTS 20.78s no UE       15.52s       12.60s     6MWT 2MWT 168. 3ft  w RW       W .3ft, 1 rest break at 3 min       1000ft w SPC no rest break     FGA              15/30     Briseno        46      50     10mWT              0.49m/s      Manuals                                                                                               Neuro Re-Ed                   Gait training in SOLO  -SPC 8x 40ft supervision    -SPC 8x 40ft 2.5lb AW on L LE intermittent CGA    -Backwards walking with SPC 4x20ft    -side stepping 2lb weight on L LE 8x 20ft     -No AD 8x 40ft supervision with 2.5lb AW on L LE, intermittent CGA    -side stepping with SPC, 2lb weight on L LE 8x 20ft -No AD 8x 40ft with 2.5lb AW on L LE, intermittent CGA 8x 20ft    -side stepping with 2.5lb AW  L LE with no AD -No AD 8 laps 20ft with 3lb AW on L LE     -treadmill speed 1.0mph incline 1% 6 min 3lb AW on L LE -SOLO Treadmill speed 1. 3mph incline 1% 3lb AW on L LE 6 min    -backwards walking 6x20ft no AD in SOLO -SOLO treadmill speed 1. 3mph incline 1% 3lb AW on L LE 6 min -SOLO treadmill speed 1. 3mph incline 1% 3lb AW on L LE 6 min -SOLO treadmill speed 1. 3mph incline 1% 3lb AW on L LE 6 min -SOLO treadmill speed 1. 3mph incline 2.0% 3lb AW on L LE 8 min -SOLO treadmill speed 1. 3mph incline 2.0% 3lb AW on L LE 6 min with  -SOLO treadmill speed 1.5 mph incline 3.0% 6 min -SOLO treadmill speed 1. 3mph incline 2.0% with 4# AW 8min -SOLO treadmill speed 1. 5mph incline 2.5% with 5# AW 9 min -SOLO Treadmill no UE support 1. 1mph incline 0%with 5# AW 6 min with frequent rest breaks required -SOLO Treadmill no UE support 1.1 mph incline  0% with 5# AW 10 min frequent rest breaks required every 3.0min   Hurdles    In SOLO 4 hurdles lowest setting no AD 2.5lb AW In SOLO 6 hurdles middle setting no AD 2.5lb AW -In SOLO 6 hurdles middle setting no AD 3lb AW -In SOLO 6 hurdles middle setting no AD 3lb AW -In SOLO 6 hurdles middle setting no AD 3lb AW -In SOLO 6 hurdles middle setting no AD 3lb AW In SOLO 6 hurdles no AD 3lb AW:   - fwd middle setting x4 laps   -lat low setting x3 laps In SOLO 6 hurdles no AD 3lb AW    -fwd middle setting x4 laps with 3lb AW  -lat low setting x3 laps  with 3 LB AW In SOLO 6 hurdles no AD 3lb AW, fwd highest setting x4 laps with 3lb AW    -side stepping middle setting x4 laps with 3lb AW In SOLO 6 hurdles no AD 3lb AW fwd middle  setting x4 laps with 3lb AW     In SOLO 6 hurdles no AD 4lb AW fwd middle setting 4x, sideways 4x, and forward on highes t setting 2x In SOLO 6 hurdles no AD 5lb AW highest setting fwd 6x, 6x sideways. In SOLO 6 hurdles no AD 5lb AW highest setting fwd 6x, 6x sideways In SOLO 6 hurdles no AD 5lb AW highest setting fwd 6x, 6x sideways over foam beam.    Uneven surface gait       Uneven mat with AW underneath 6x full length of mat. -Walking on foam beams (2 next to each other) fwd only x4 laps 3# AW -side stepping on foam beams x10ft with 3lb AW x5 laps -side stepping on foam beams x10ft with 3lb AW x5 laps    -uneven surface mat with AW underneath 6x length of mat no AD  Side stepping on foam beams 2 beams with 4lb AW x 6 laps     Side stepping on foam beam with 2 beams 5lb AW x 6laps Side stepping on foam with 2 beams 6lb AW x6 laps    Static balance   FT EC 3x1 min FT EC 3x1 min         FT EC 3x1 min FT EC 4x1 min on foam Ft EC 4x1 min on foam  FT EC 4x1 min on foam   Tandem gait                4x40ft with 5lb AW   Testing                   Dynamic Balance  -standing step taps with SPC, 2x10 bilaterally in SOLO  -standing cone taps in SOLO 20x/LE    -Backwards walking 6x20ft  -Standing Cone taps in SOLO 20x/LE -standing cone taps in SOLO 20x/LE     Backwards walking 3x40t       Reactive strategy training              Tapping incline board 30x forward 30x backward. Resisted gait             Pink t-band resistance to R LE, 4lb AW L LE, with QC. 20ft 6x to resist during stance. Tests & measures              FGA, 6MWT, ABC, 10mWT, GARZA, 5xSTS     Patient education              Ongoing strength & balance deficits, importance of unilateral L Strengthening to challenge L LE, ongoing endurance deficits, plan moving forwards. Step ups     1x8 leading with R LE lowering with L LE.  Initial modA, improved to CGA by conclusion of session 2x10 leading with R LE lowering with L LE with SPC to prepare for weaning off RW. 2x10 middle setting 2x10 leading with R LE lowering with L LE with SPC middle setting. 3lb AW.    - up and down foam incline  LLE leading x5 3# AW -up and down highest setting on step with QC, occasional cueing needed for sequencing 2x10 with 3lb AW -up and down highest setting on step with QC occasional cueing needed for sequencing 2x10 with 3lb AW        Ther Ex                   Split stance STS               3x10 biasing L LE    LAQ               2x10 bilaterally 10lb AW    Heel Raises                   Patient education               Review of updated HEP                                                                                 Ther Activity                   Skin inspection                   Curb transfer       With [facility] supervision assistance            AD Training       With [facility] around clinic and to car            Gait Training                                                         Modalities

## 2023-12-18 ENCOUNTER — OFFICE VISIT (OUTPATIENT)
Facility: CLINIC | Age: 53
End: 2023-12-18
Payer: COMMERCIAL

## 2023-12-18 DIAGNOSIS — G03.9 MENINGITIS: ICD-10-CM

## 2023-12-18 DIAGNOSIS — I63.9 CEREBROVASCULAR ACCIDENT (CVA), UNSPECIFIED MECHANISM (HCC): Primary | ICD-10-CM

## 2023-12-18 DIAGNOSIS — R26.2 DIFFICULTY WALKING: ICD-10-CM

## 2023-12-18 PROCEDURE — 97112 NEUROMUSCULAR REEDUCATION: CPT

## 2023-12-18 NOTE — PROGRESS NOTES
Daily Note     Today's date: 2023  Patient name: Seth Almazan  : 1970  MRN: [id number]  Referring provider: Stephanie Hurd P*  Dx:   Encounter Diagnosis     ICD-10-CM    1. Cerebrovascular accident (CVA), unspecified mechanism (HCC)  I63.9       2. Meningitis  G03.9       3. Difficulty walking  R26.2           Start Time: 1145  Stop Time: 1230  Total time in clinic (min): 45 minutes    Subjective: Pt states that he walked his dog the other day and that was very hard. There were several close calls but no falls.       Objective: See treatment diary below      Assessment: Pt participated in a skilled PT session focused on balance, gait, and strength training. Side stepping on the treadmill was added to plan today and pt exhibited significant difficulty facing the right and pushing with his L LE, requiring frequent rest breaks. He was able to ambulate for longer duration forward on the treadmill with fewer lateral corrective steps as compared to previous visits. Pt was able to navigate hurdles over foam with close supervision today. Pt exhibited significant improvement in cone taps as compared to previous visits as noted by improved control lowering foot back to floor. The pt will continue to benefit from skilled PT intervention focused on balance, gait, and strengthening so that he can navigate his environment safely and with improved stability.       Plan: Continue per plan of care.      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   POC Expires:23  [employer] Access Code: [email or web address]       Objective IE 10/3 10/13 10/16 10/20 10/23 10/27 10/31 11/2 11/8 11/10 11/14 11/20 11/27 12/4 12/6 12/11 12/18   ABC 26.25%       51.88%       54.38%      5xSTS 20.78s no  UE       15.52s       12.60s      6MWT 2MWT 168.3ft  w RW       W .3ft, 1 rest break at 3 min       1000ft w SPC no rest break      FGA              15/30      Briseno        46      50      10mWT              0.49m/s       Manuals                                                                                                    Neuro Re-Ed                    Gait training in SOLO  -SPC 8x 40ft supervision    -SPC 8x 40ft 2.5lb AW on L LE intermittent CGA    -Backwards walking with SPC 4x20ft    -side stepping 2lb weight on L LE 8x 20ft     -No AD 8x 40ft supervision with 2.5lb AW on L LE, intermittent CGA    -side stepping with SPC, 2lb weight on L LE 8x 20ft -No AD 8x 40ft with 2.5lb AW on L LE, intermittent CGA 8x 20ft    -side stepping with 2.5lb AW  L LE with no AD -No AD 8 laps 20ft with 3lb AW on L LE     -treadmill speed 1.0mph incline 1% 6 min 3lb AW on L LE -SOLO Treadmill speed 1.3mph incline 1% 3lb AW on L LE 6 min    -backwards walking 6x20ft no AD in SOLO -SOLO treadmill speed 1.3mph incline 1% 3lb AW on L LE 6 min -SOLO treadmill speed 1.3mph incline 1% 3lb AW on L LE 6 min -SOLO treadmill speed 1.3mph incline 1% 3lb AW on L LE 6 min -SOLO treadmill speed 1.3mph incline 2.0% 3lb AW on L LE 8 min -SOLO treadmill speed 1.3mph incline 2.0% 3lb AW on L LE 6 min with  -SOLO treadmill speed 1.5 mph incline 3.0% 6 min -SOLO treadmill speed 1.3mph incline 2.0% with 4# AW 8min -SOLO treadmill speed 1.5mph incline 2.5% with 5# AW 9 min -SOLO Treadmill no UE support 1.1mph incline 0%with 5# AW 6 min with frequent rest breaks required -SOLO Treadmill no UE support 1.1 mph incline  0% with 5# AW 10 min frequent rest breaks required every 3.0min -SOLO treadmill no UE support 1.1mph incline 0.5%, 3.5 min rounds w 5# AW    Side stepping 0.5-0.6mph 3x30 seconds intervals, closer supervisoin and occasional contact guard CGA.    Hurdles    In SOLO 4 hurdles lowest setting no AD 2.5lb AW In SOLO 6 hurdles middle  setting no AD 2.5lb AW -In SOLO 6 hurdles middle setting no AD 3lb AW -In SOLO 6 hurdles middle setting no AD 3lb AW -In SOLO 6 hurdles middle setting no AD 3lb AW -In SOLO 6 hurdles middle setting no AD 3lb AW In SOLO 6 hurdles no AD 3lb AW:   - fwd middle setting x4 laps   -lat low setting x3 laps In SOLO 6 hurdles no AD 3lb AW    -fwd middle setting x4 laps with 3lb AW  -lat low setting x3 laps  with 3 LB AW In SOLO 6 hurdles no AD 3lb AW, fwd highest setting x4 laps with 3lb AW    -side stepping middle setting x4 laps with 3lb AW In SOLO 6 hurdles no AD 3lb AW fwd middle  setting x4 laps with 3lb AW     In SOLO 6 hurdles no AD 4lb AW fwd middle setting 4x, sideways 4x, and forward on highes t setting 2x In SOLO 6 hurdles no AD 5lb AW highest setting fwd 6x, 6x sideways.  In SOLO 6 hurdles no AD 5lb AW highest setting fwd 6x, 6x sideways In SOLO 6 hurdles no AD 5lb AW highest setting fwd 6x, 6x sideways over foam beam.  In SOLO 6 hurdles no AD 5lb AW highest setting sideways over foam beam 6x close supervision   Uneven surface gait       Uneven mat with AW underneath 6x full length of mat.   -Walking on foam beams (2 next to each other) fwd only x4 laps 3# AW -side stepping on foam beams x10ft with 3lb AW x5 laps -side stepping on foam beams x10ft with 3lb AW x5 laps    -uneven surface mat with AW underneath 6x length of mat no AD  Side stepping on foam beams 2 beams with 4lb AW x 6 laps     Side stepping on foam beam with 2 beams 5lb AW x 6laps Side stepping on foam with 2 beams 6lb AW x6 laps     Static balance   FT EC 3x1 min FT EC 3x1 min         FT EC 3x1 min FT EC 4x1 min on foam Ft EC 4x1 min on foam  FT EC 4x1 min on foam FT EC 4x1 min on foam    Tandem gait                4x40ft with 5lb AW    Testing                    Dynamic Balance  -standing step taps with SPC, 2x10 bilaterally in SOLO  -standing cone taps in SOLO 20x/LE    -Backwards walking 6x20ft  -Standing Cone taps in SOLO 20x/LE -standing cone  taps in SOLO 20x/LE     Backwards walking 3x40t        Reactive strategy training              Tapping incline board 30x forward 30x backward.       Resisted gait             Pink t-band resistance to R LE, 4lb AW L LE, with QC. 20ft 6x to resist during stance.       Tests & measures              FGA, 6MWT, ABC, 10mWT, GARZA, 5xSTS      Patient education              Ongoing strength & balance deficits, importance of unilateral L Strengthening to challenge L LE, ongoing endurance deficits, plan moving forwards.      Step ups     1x8 leading with R LE lowering with L LE. Initial modA, improved to CGA by conclusion of session 2x10 leading with R LE lowering with L LE with SPC to prepare for weaning off RW. 2x10 middle setting 2x10 leading with R LE lowering with L LE with SPC middle setting. 3lb AW.   - up and down foam incline  LLE leading x5 3# AW -up and down highest setting on step with QC, occasional cueing needed for sequencing 2x10 with 3lb AW -up and down highest setting on step with QC occasional cueing needed for sequencing 2x10 with 3lb AW         Ther Ex                    Split stance STS               3x10 biasing L LE     LAQ               2x10 bilaterally 10lb AW     Heel Raises                    Patient education               Review of updated HEP                                                                                      Ther Activity                    Skin inspection                    Curb transfer       With SPC supervision assistance             AD Training       With SPC around clinic and to car             Gait Training                                                            Modalities

## 2023-12-19 NOTE — PROGRESS NOTES
Daily Note     Today's date: 2023  Patient name: Seth Almazan  : 1970  MRN: [id number]  Referring provider: Stephanie Hurd P*  Dx:   Encounter Diagnosis     ICD-10-CM    1. Cerebrovascular accident (CVA), unspecified mechanism (HCC)  I63.9       2. Meningitis  G03.9       3. Difficulty walking  R26.2                      Subjective: Pt states that he is doing okay. He has a lot of pain today.       Objective: See treatment diary below      Assessment: Pt participated in a skilled PT session focused on balance, gait, and strengthening intervention. Pt will continue to benefit from skilled PT intervention focused on balance, gait, and strengthening intervention so that he may be able to ambulate safely and with increased stability.       Plan: Continue per plan of care.      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   POC Expires:23  [employer] Access Code: [email or web address]       Objective IE 10/3 10/16 10/20 10/23 10/27 10/31 11/2 11/8 11/10 11/14 11/20 11/27 12/4 12/6 12/11 12/18 12/20   ABC 26.25%      51.88%       54.38%       5xSTS 20.78s no UE      15.52s       12.60s       6MWT 2MWT 168.3ft  w RW      W .3ft, 1 rest break at 3 min       1000ft w SPC no rest break       FGA             15/30       Briseno       46      50       10mWT             0.49m/s        Manuals                                                                                                    Neuro Re-Ed                    Gait training in SOLO  -No AD 8x 40ft supervision with 2.5lb AW on L LE, intermittent CGA    -side stepping with SPC, 2lb weight on L LE 8x 20ft -No AD 8x 40ft with 2.5lb AW on L LE, intermittent CGA 8x 20ft    -side stepping with 2.5lb AW  L LE with no AD -No  AD 8 laps 20ft with 3lb AW on L LE     -treadmill speed 1.0mph incline 1% 6 min 3lb AW on L LE -SOLO Treadmill speed 1.3mph incline 1% 3lb AW on L LE 6 min    -backwards walking 6x20ft no AD in SOLO -SOLO treadmill speed 1.3mph incline 1% 3lb AW on L LE 6 min -SOLO treadmill speed 1.3mph incline 1% 3lb AW on L LE 6 min -SOLO treadmill speed 1.3mph incline 1% 3lb AW on L LE 6 min -SOLO treadmill speed 1.3mph incline 2.0% 3lb AW on L LE 8 min -SOLO treadmill speed 1.3mph incline 2.0% 3lb AW on L LE 6 min with  -SOLO treadmill speed 1.5 mph incline 3.0% 6 min -SOLO treadmill speed 1.3mph incline 2.0% with 4# AW 8min -SOLO treadmill speed 1.5mph incline 2.5% with 5# AW 9 min -SOLO Treadmill no UE support 1.1mph incline 0%with 5# AW 6 min with frequent rest breaks required -SOLO Treadmill no UE support 1.1 mph incline  0% with 5# AW 10 min frequent rest breaks required every 3.0min -SOLO treadmill no UE support 1.1mph incline 0.5%, 3.5 min rounds w 5# AW    Side stepping 0.5-0.6mph 3x30 seconds intervals, closer supervisoin and occasional contact guard CGA.  -SOLO treadmill no UE support 1.1mph incline 1.0% 3.5 minrounds with 5# AW     Side stepping 0.5-0.6mph close supervision   Hurdles   In SOLO 4 hurdles lowest setting no AD 2.5lb AW In SOLO 6 hurdles middle setting no AD 2.5lb AW -In SOLO 6 hurdles middle setting no AD 3lb AW -In SOLO 6 hurdles middle setting no AD 3lb AW -In SOLO 6 hurdles middle setting no AD 3lb AW -In SOLO 6 hurdles middle setting no AD 3lb AW In SOLO 6 hurdles no AD 3lb AW:   - fwd middle setting x4 laps   -lat low setting x3 laps In SOLO 6 hurdles no AD 3lb AW    -fwd middle setting x4 laps with 3lb AW  -lat low setting x3 laps  with 3 LB AW In SOLO 6 hurdles no AD 3lb AW, fwd highest setting x4 laps with 3lb AW    -side stepping middle setting x4 laps with 3lb AW In SOLO 6 hurdles no AD 3lb AW fwd middle  setting x4 laps with 3lb AW     In SOLO 6 hurdles no AD 4lb AW fwd middle setting 4x,  sideways 4x, and forward on highes t setting 2x In SOLO 6 hurdles no AD 5lb AW highest setting fwd 6x, 6x sideways.  In SOLO 6 hurdles no AD 5lb AW highest setting fwd 6x, 6x sideways In SOLO 6 hurdles no AD 5lb AW highest setting fwd 6x, 6x sideways over foam beam.  In SOLO 6 hurdles no AD 5lb AW highest setting sideways over foam beam 6x close supervision    Uneven surface gait      Uneven mat with AW underneath 6x full length of mat.   -Walking on foam beams (2 next to each other) fwd only x4 laps 3# AW -side stepping on foam beams x10ft with 3lb AW x5 laps -side stepping on foam beams x10ft with 3lb AW x5 laps    -uneven surface mat with AW underneath 6x length of mat no AD  Side stepping on foam beams 2 beams with 4lb AW x 6 laps     Side stepping on foam beam with 2 beams 5lb AW x 6laps Side stepping on foam with 2 beams 6lb AW x6 laps      Static balance  FT EC 3x1 min FT EC 3x1 min         FT EC 3x1 min FT EC 4x1 min on foam Ft EC 4x1 min on foam  FT EC 4x1 min on foam FT EC 4x1 min on foam     Tandem gait               4x40ft with 5lb AW     Testing                    Dynamic Balance   -standing cone taps in SOLO 20x/LE    -Backwards walking 6x20ft  -Standing Cone taps in SOLO 20x/LE -standing cone taps in SOLO 20x/LE     Backwards walking 3x40t         Reactive strategy training             Tapping incline board 30x forward 30x backward.        Resisted gait            Pink t-band resistance to R LE, 4lb AW L LE, with QC. 20ft 6x to resist during stance.        Tests & measures             FGA, 6MWT, ABC, 10mWT, GARZA, 5xSTS       Patient education             Ongoing strength & balance deficits, importance of unilateral L Strengthening to challenge L LE, ongoing endurance deficits, plan moving forwards.       Step ups    1x8 leading with R LE lowering with L LE. Initial modA, improved to CGA by conclusion of session 2x10 leading with R LE lowering with L LE with SPC to prepare for weaning off RW. 2x10  middle setting 2x10 leading with R LE lowering with L LE with SPC middle setting. 3lb AW.   - up and down foam incline  LLE leading x5 3# AW -up and down highest setting on step with QC, occasional cueing needed for sequencing 2x10 with 3lb AW -up and down highest setting on step with QC occasional cueing needed for sequencing 2x10 with 3lb AW       2x10 bilaterally +5lb AW on L LE no AD forced use   Ther Ex                    Split stance STS              3x10 biasing L LE      LAQ              2x10 bilaterally 10lb AW      Heel Raises                    Patient education              Review of updated HEP                                                                                       Ther Activity                    Skin inspection                    Curb transfer      With SPC supervision assistance              AD Training      With SPC around clinic and to car              Gait Training                                                            Modalities

## 2023-12-20 ENCOUNTER — OFFICE VISIT (OUTPATIENT)
Facility: CLINIC | Age: 53
End: 2023-12-20
Payer: COMMERCIAL

## 2023-12-20 DIAGNOSIS — G03.9 MENINGITIS: ICD-10-CM

## 2023-12-20 DIAGNOSIS — R26.2 DIFFICULTY WALKING: ICD-10-CM

## 2023-12-20 DIAGNOSIS — I63.9 CEREBROVASCULAR ACCIDENT (CVA), UNSPECIFIED MECHANISM (HCC): Primary | ICD-10-CM

## 2023-12-20 PROCEDURE — 97112 NEUROMUSCULAR REEDUCATION: CPT

## 2023-12-21 DIAGNOSIS — F43.23 SITUATIONAL MIXED ANXIETY AND DEPRESSIVE DISORDER: ICD-10-CM

## 2023-12-21 RX ORDER — HYDROXYZINE HYDROCHLORIDE 25 MG/1
TABLET, FILM COATED ORAL
Qty: 30 TABLET | Refills: 0 | Status: SHIPPED | OUTPATIENT
Start: 2023-12-21

## 2023-12-26 ENCOUNTER — APPOINTMENT (OUTPATIENT)
Facility: CLINIC | Age: 53
End: 2023-12-26
Payer: COMMERCIAL

## 2023-12-27 DIAGNOSIS — I10 ESSENTIAL HYPERTENSION: ICD-10-CM

## 2023-12-27 RX ORDER — LOSARTAN POTASSIUM 25 MG/1
25 TABLET ORAL DAILY
Qty: 90 TABLET | Refills: 1 | Status: SHIPPED | OUTPATIENT
Start: 2023-12-27

## 2023-12-28 ENCOUNTER — APPOINTMENT (OUTPATIENT)
Facility: CLINIC | Age: 53
End: 2023-12-28
Payer: COMMERCIAL

## 2024-01-02 ENCOUNTER — OFFICE VISIT (OUTPATIENT)
Facility: CLINIC | Age: 54
End: 2024-01-02
Payer: COMMERCIAL

## 2024-01-02 DIAGNOSIS — R26.2 DIFFICULTY WALKING: ICD-10-CM

## 2024-01-02 DIAGNOSIS — I63.9 CEREBROVASCULAR ACCIDENT (CVA), UNSPECIFIED MECHANISM (HCC): Primary | ICD-10-CM

## 2024-01-02 DIAGNOSIS — G03.9 MENINGITIS: ICD-10-CM

## 2024-01-02 PROCEDURE — 97112 NEUROMUSCULAR REEDUCATION: CPT

## 2024-01-02 NOTE — PROGRESS NOTES
Daily Note     Today's date: 2024  Patient name: Seth Almazan  : 1970  MRN: [id number]  Referring provider: Stephanie Hurd P*  Dx:   Encounter Diagnosis     ICD-10-CM    1. Cerebrovascular accident (CVA), unspecified mechanism (HCC)  I63.9       2. Meningitis  G03.9       3. Difficulty walking  R26.2           Start Time: 0930  Stop Time: 1015  Total time in clinic (min): 45 minutes    Subjective: Pt states that he is doing good, his weekend was very busy and he walked a lot.       Objective: See treatment diary below      Assessment: Pt participated in a skilled PT session focused on balance, gait, and strengthening intervention. Pt was able to walk increased duration on TM today prior to rest break. Progressed step ups with 7lb dumbbells and pt able to perform with improved control. Pt will continue to benefit from skilled PT intervention focused on balance, gait, and strengthening so that he can navigate his environment safely and with increased stability.      Plan: Continue per plan of care.      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   POC Expires:2024  [employer] Access Code: [email or web address]       Objective IE 10/3 10/16 10/20 10/23 10/27 10/31 11/2: RE 11/8 11/10 11/14 11/20 11/27 12/4: RE 24   ABC 26.25%      51.88%       54.38%        5xSTS 20.78s no UE      15.52s       12.60s        6MWT 2MWT 168.3ft  w RW      W .3ft, 1 rest break at 3 min       1000ft w SPC no rest break        FGA             15/30        Briseno       46      50        10mWT             0.49m/s         Manuals                                                                                                         Neuro Re-Ed                      Gait training in SOLO  -No AD 8x 40ft supervision with 2.5lb AW on L LE, intermittent CGA    -side stepping with SPC, 2lb weight on L LE 8x 20ft -No AD 8x 40ft with 2.5lb AW on L LE, intermittent CGA 8x 20ft    -side stepping with 2.5lb AW  L LE with no AD -No AD 8 laps 20ft with 3lb AW on L LE     -treadmill speed 1.0mph incline 1% 6 min 3lb AW on L LE -SOLO Treadmill speed 1.3mph incline 1% 3lb AW on L LE 6 min    -backwards walking 6x20ft no AD in SOLO -SOLO treadmill speed 1.3mph incline 1% 3lb AW on L LE 6 min -SOLO treadmill speed 1.3mph incline 1% 3lb AW on L LE 6 min -SOLO treadmill speed 1.3mph incline 1% 3lb AW on L LE 6 min -SOLO treadmill speed 1.3mph incline 2.0% 3lb AW on L LE 8 min -SOLO treadmill speed 1.3mph incline 2.0% 3lb AW on L LE 6 min with  -SOLO treadmill speed 1.5 mph incline 3.0% 6 min -SOLO treadmill speed 1.3mph incline 2.0% with 4# AW 8min -SOLO treadmill speed 1.5mph incline 2.5% with 5# AW 9 min -SOLO Treadmill no UE support 1.1mph incline 0%with 5# AW 6 min with frequent rest breaks required -SOLO Treadmill no UE support 1.1 mph incline  0% with 5# AW 10 min frequent rest breaks required every 3.0min -SOLO treadmill no UE support 1.1mph incline 0.5%, 3.5 min rounds w 5# AW    Side stepping 0.5-0.6mph 3x30 seconds intervals, closer supervisoin and occasional contact guard CGA.  -SOLO treadmill no UE support 1.1mph incline 1.0% 3.5 minrounds with 5# AW     Side stepping 0.5-0.6mph close supervision -SOLO treadmill no UE support 1.2mph incline 1.0% 4.min rounds with 5# AW    Side stepping 0.5-0.6 mph close supervision, 3x30s intervals, close supervision   Hurdles   In SOLO 4 hurdles lowest setting no AD 2.5lb AW In SOLO 6 hurdles middle setting no AD 2.5lb AW -In SOLO 6 hurdles middle setting no AD 3lb AW -In SOLO 6 hurdles middle setting no AD 3lb AW -In SOLO 6 hurdles middle setting no AD 3lb AW -In SOLO 6 hurdles middle setting no AD 3lb AW In SOLO 6 hurdles no AD 3lb AW:   - fwd  middle setting x4 laps   -lat low setting x3 laps In SOLO 6 hurdles no AD 3lb AW    -fwd middle setting x4 laps with 3lb AW  -lat low setting x3 laps  with 3 LB AW In SOLO 6 hurdles no AD 3lb AW, fwd highest setting x4 laps with 3lb AW    -side stepping middle setting x4 laps with 3lb AW In SOLO 6 hurdles no AD 3lb AW fwd middle  setting x4 laps with 3lb AW     In SOLO 6 hurdles no AD 4lb AW fwd middle setting 4x, sideways 4x, and forward on highes t setting 2x In SOLO 6 hurdles no AD 5lb AW highest setting fwd 6x, 6x sideways.  In SOLO 6 hurdles no AD 5lb AW highest setting fwd 6x, 6x sideways In SOLO 6 hurdles no AD 5lb AW highest setting fwd 6x, 6x sideways over foam beam.  In SOLO 6 hurdles no AD 5lb AW highest setting sideways over foam beam 6x close supervision     Uneven surface gait      Uneven mat with AW underneath 6x full length of mat.   -Walking on foam beams (2 next to each other) fwd only x4 laps 3# AW -side stepping on foam beams x10ft with 3lb AW x5 laps -side stepping on foam beams x10ft with 3lb AW x5 laps    -uneven surface mat with AW underneath 6x length of mat no AD  Side stepping on foam beams 2 beams with 4lb AW x 6 laps     Side stepping on foam beam with 2 beams 5lb AW x 6laps Side stepping on foam with 2 beams 6lb AW x6 laps       Static balance  FT EC 3x1 min FT EC 3x1 min         FT EC 3x1 min FT EC 4x1 min on foam Ft EC 4x1 min on foam  FT EC 4x1 min on foam FT EC 4x1 min on foam      Tandem gait               4x40ft with 5lb AW      Testing                     Dynamic Balance   -standing cone taps in SOLO 20x/LE    -Backwards walking 6x20ft  -Standing Cone taps in SOLO 20x/LE -standing cone taps in SOLO 20x/LE     Backwards walking 3x40t          Reactive strategy training             Tapping incline board 30x forward 30x backward.         Resisted gait            Pink t-band resistance to R LE, 4lb AW L LE, with QC. 20ft 6x to resist during stance.         Tests & measures              FGA, 6MWT, ABC, 10mWT, GARZA, 5xSTS        Patient education             Ongoing strength & balance deficits, importance of unilateral L Strengthening to challenge L LE, ongoing endurance deficits, plan moving forwards.        Step ups    1x8 leading with R LE lowering with L LE. Initial modA, improved to CGA by conclusion of session 2x10 leading with R LE lowering with L LE with SPC to prepare for weaning off RW. 2x10 middle setting 2x10 leading with R LE lowering with L LE with SPC middle setting. 3lb AW.   - up and down foam incline  LLE leading x5 3# AW -up and down highest setting on step with QC, occasional cueing needed for sequencing 2x10 with 3lb AW -up and down highest setting on step with QC occasional cueing needed for sequencing 2x10 with 3lb AW       2x10 bilaterally +5lb AW on L LE no AD forced use 2x10 bilaterally 5lb AW on L LE no AD forced use holding 7lb AW bilaterally.    Ther Ex                     Split stance STS              3x10 biasing L LE       LAQ              2x10 bilaterally 10lb AW       Heel Raises                     Patient education              Review of updated HEP                                                                                            Ther Activity                     Skin inspection                     Curb transfer      With SPC supervision assistance               AD Training      With SPC around clinic and to car               Gait Training                                                               Modalities

## 2024-01-04 ENCOUNTER — OFFICE VISIT (OUTPATIENT)
Facility: CLINIC | Age: 54
End: 2024-01-04
Payer: COMMERCIAL

## 2024-01-04 DIAGNOSIS — G03.9 MENINGITIS: ICD-10-CM

## 2024-01-04 DIAGNOSIS — I63.9 CEREBROVASCULAR ACCIDENT (CVA), UNSPECIFIED MECHANISM (HCC): Primary | ICD-10-CM

## 2024-01-04 DIAGNOSIS — R26.2 DIFFICULTY WALKING: ICD-10-CM

## 2024-01-04 PROCEDURE — 97112 NEUROMUSCULAR REEDUCATION: CPT

## 2024-01-04 NOTE — PROGRESS NOTES
Daily Note     Today's date: 2024  Patient name: Seth Almazan  : 1970  MRN: [id number]  Referring provider: Stephanie Hurd P*  Dx:   Encounter Diagnosis     ICD-10-CM    1. Cerebrovascular accident (CVA), unspecified mechanism (HCC)  I63.9       2. Meningitis  G03.9       3. Difficulty walking  R26.2                      Subjective: Pt states he is doing well today.       Objective: See treatment diary below      Assessment: Pt participated in a skilled PT session focused on balance, gait,and strengthening intervention. Pt exhibited improved gait mechanics with faster speed. Speed was progressed with reduction in duration to ensure adequate HR response for high intensity gait training within 60-80% of max HR. Pt exhibited improved success with side stepping. Increased fatigue noted after side stepping on treadmill prior to hurdles, resulted in increased error, but pt able to recover all LOB independently. Pt will continue to benefit from skilled PT intervention focused on balance, gait, and strengthening so that he can navigate his environment safely and with increased stability.       Plan: Continue per plan of care.      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   POC Expires:2024  [employer] Access Code: [email or web address]       Objective IE 10/3 10/23 10/27 10/31 11/2: RE 11/8 11/10 11/14 11/20 11/27 12/4: RE 24    ABC 26.25%    51.88%       54.38%          5xSTS 20.78s no UE    15.52s       12.60s          6MWT 2MWT 168.3ft  w RW    W .3ft, 1 rest break at 3 min       1000ft w SPC no rest break          FGA           15/30          [place]ris[place]     46      50          10mWT           0.49m/s           Manuals                   "                                                                                       Neuro Re-Ed                     Gait training in SOLO  -No AD 8 laps 20ft with 3lb AW on L LE     -treadmill speed 1.0mph incline 1% 6 min 3lb AW on L LE -SOLO Treadmill speed 1.3mph incline 1% 3lb AW on L LE 6 min    -backwards walking 6x20ft no AD in SOLO -SOLO treadmill speed 1.3mph incline 1% 3lb AW on L LE 6 min -SOLO treadmill speed 1.3mph incline 1% 3lb AW on L LE 6 min -SOLO treadmill speed 1.3mph incline 1% 3lb AW on L LE 6 min -SOLO treadmill speed 1.3mph incline 2.0% 3lb AW on L LE 8 min -SOLO treadmill speed 1.3mph incline 2.0% 3lb AW on L LE 6 min with  -SOLO treadmill speed 1.5 mph incline 3.0% 6 min -SOLO treadmill speed 1.3mph incline 2.0% with 4# AW 8min -SOLO treadmill speed 1.5mph incline 2.5% with 5# AW 9 min -SOLO Treadmill no UE support 1.1mph incline 0%with 5# AW 6 min with frequent rest breaks required -SOLO Treadmill no UE support 1.1 mph incline  0% with 5# AW 10 min frequent rest breaks required every 3.0min -SOLO treadmill no UE support 1.1mph incline 0.5%, 3.5 min rounds w 5# AW    Side stepping 0.5-0.6mph 3x30 seconds intervals, closer supervisoin and occasional contact guard CGA.  -SOLO treadmill no UE support 1.1mph incline 1.0% 3.5 minrounds with 5# AW     Side stepping 0.5-0.6mph close supervision -SOLO treadmill no UE support 1.2mph incline 1.0% 4.min rounds with 5# AW    Side stepping 0.5-0.6 mph close supervision, 3x30s intervals, close supervision -SOLO treadmill no UE support 1.2mph incline 1.0% 4 min round with 5# AW    -SOLO treadmill No UE support 2min 1.6mph 3x    Side stepping 0.5-0.6mph close supervision 3x30\" intervals close supervision    Hurdles   -In SOLO 6 hurdles middle setting no AD 3lb AW -In SOLO 6 hurdles middle setting no AD 3lb AW -In SOLO 6 hurdles middle setting no AD 3lb AW -In SOLO 6 hurdles middle setting no AD 3lb AW In SOLO 6 hurdles no AD 3lb AW:   - fwd middle " setting x4 laps   -lat low setting x3 laps In SOLO 6 hurdles no AD 3lb AW    -fwd middle setting x4 laps with 3lb AW  -lat low setting x3 laps  with 3 LB AW In SOLO 6 hurdles no AD 3lb AW, fwd highest setting x4 laps with 3lb AW    -side stepping middle setting x4 laps with 3lb AW In SOLO 6 hurdles no AD 3lb AW fwd middle  setting x4 laps with 3lb AW     In SOLO 6 hurdles no AD 4lb AW fwd middle setting 4x, sideways 4x, and forward on highes t setting 2x In SOLO 6 hurdles no AD 5lb AW highest setting fwd 6x, 6x sideways.  In SOLO 6 hurdles no AD 5lb AW highest setting fwd 6x, 6x sideways In SOLO 6 hurdles no AD 5lb AW highest setting fwd 6x, 6x sideways over foam beam.  In SOLO 6 hurdles no AD 5lb AW highest setting sideways over foam beam 6x close supervision   In SOLO 6 hurdles no AD 5lb AW highest setting sideways over foam beam 6x close supervision    Uneven surface gait    Uneven mat with AW underneath 6x full length of mat.   -Walking on foam beams (2 next to each other) fwd only x4 laps 3# AW -side stepping on foam beams x10ft with 3lb AW x5 laps -side stepping on foam beams x10ft with 3lb AW x5 laps    -uneven surface mat with AW underneath 6x length of mat no AD  Side stepping on foam beams 2 beams with 4lb AW x 6 laps     Side stepping on foam beam with 2 beams 5lb AW x 6laps Side stepping on foam with 2 beams 6lb AW x6 laps         Static balance         FT EC 3x1 min FT EC 4x1 min on foam Ft EC 4x1 min on foam  FT EC 4x1 min on foam FT EC 4x1 min on foam        Tandem gait             4x40ft with 5lb AW        Testing                     Dynamic Balance   -Standing Cone taps in SOLO 20x/LE -standing cone taps in SOLO 20x/LE     Backwards walking 3x40t            Reactive strategy training           Tapping incline board 30x forward 30x backward.           Resisted gait          Pink t-band resistance to R LE, 4lb AW L LE, with QC. 20ft 6x to resist during stance.           Tests & measures           FGA,  6MWT, ABC, 10mWT, GARZA, 5xSTS          Patient education           Ongoing strength & balance deficits, importance of unilateral L Strengthening to challenge L LE, ongoing endurance deficits, plan moving forwards.          Step ups  1x8 leading with R LE lowering with L LE. Initial modA, improved to CGA by conclusion of session 2x10 leading with R LE lowering with L LE with SPC to prepare for weaning off RW. 2x10 middle setting 2x10 leading with R LE lowering with L LE with SPC middle setting. 3lb AW.   - up and down foam incline  LLE leading x5 3# AW -up and down highest setting on step with QC, occasional cueing needed for sequencing 2x10 with 3lb AW -up and down highest setting on step with QC occasional cueing needed for sequencing 2x10 with 3lb AW       2x10 bilaterally +5lb AW on L LE no AD forced use 2x10 bilaterally 5lb AW on L LE no AD forced use holding 7lb AW bilaterally.      Ther Ex                     Split stance STS            3x10 biasing L LE         LAQ            2x10 bilaterally 10lb AW         Heel Raises                     Patient education            Review of updated HEP                                                                                              Ther Activity                     Skin inspection                     Curb transfer    With SPC supervision assistance                 AD Training    With SPC around clinic and to car                 Gait Training                                                               Modalities

## 2024-01-08 ENCOUNTER — APPOINTMENT (OUTPATIENT)
Facility: CLINIC | Age: 54
End: 2024-01-08
Payer: COMMERCIAL

## 2024-01-09 ENCOUNTER — EVALUATION (OUTPATIENT)
Facility: CLINIC | Age: 54
End: 2024-01-09
Payer: COMMERCIAL

## 2024-01-09 DIAGNOSIS — I63.9 CEREBROVASCULAR ACCIDENT (CVA), UNSPECIFIED MECHANISM (HCC): Primary | ICD-10-CM

## 2024-01-09 DIAGNOSIS — R26.2 DIFFICULTY WALKING: ICD-10-CM

## 2024-01-09 DIAGNOSIS — G03.9 MENINGITIS: ICD-10-CM

## 2024-01-09 PROCEDURE — 97112 NEUROMUSCULAR REEDUCATION: CPT

## 2024-01-09 NOTE — PROGRESS NOTES
PT Re-Evaluation     Today's date: 2024  Patient name: Seth Almazan  : 1970  MRN: [id number]  Referring provider: Stephanie Hurd P*  Dx:   Encounter Diagnosis     ICD-10-CM    1. Cerebrovascular accident (CVA), unspecified mechanism (HCC)  I63.9       2. Meningitis  G03.9       3. Difficulty walking  R26.2                        Assessment  Assessment details: 2023: Pt is a 53 y.o. male who has received skilled PT intervention focused on balance, gait, and strengthening intervention to address deficits s/p CVA & meningitis due to chiari malformation repair surgery. Today's assessment reflects significant improvement in pt's dynamic balance, LE power, and gait speed as compared to previous visit. 6MWT score was reduced, however, pt was utilizing a less supportive device during today's session as compared to previous session (using an SPC today and a QPC previously). FGA was performed without AD and pt exhibited improvement compared to his last assessment with AD use.  Despite these improvements, pt still suffers from significant LBP, which impacts his ability to ambulate long distances. Furthermore, 6MWT score continues to reflect significant CV deficits, and FGA places him at risk for falls. Gait speed places him at a limited community ambulator. Pt will therefore continue to benefit from skilled PT intervention focused on balance, gait, and strengthening so that he can navigate his environment with increased safety and stability.     2023: Pt is a 53 y.o. male who has received 16 visits of skilled PT intervention focused on balance, gait, and strengthening intervention. Today's assessment reflects statistically significant improvement in LE power, CV fitness, and balance as indicated by improvement in 5xSTS, 6MWT, and GARZA score. FGA added today to assess pt's dynamic balance and pt scored 15/30, reflecting ongoing increased risk of falls. Balance confidence also still reflects  increased risk of falls as noted by ABC <67%. Pt notes ongoing difficulty with L LE strength and concerns surrounding buckling, so planning to focus on unilateral strengthening to aid in improving L LE strength and power. Descending stairs also continues to be a challenge as noted by reduced eccentric control lowering with the L LE, and requires ongoing PT so he can navigate the stairs in his home. Pt will continue to benefit from skilled PT intervention focused on balance, gait, and strengthening so that he can navigate his environment safely and with increased stability.     11/29/23: Pt is a 53 y.o. male who has received 15 visits of skilled PT intervention focused on balance, gait, and strengthening intervention. Today's visit reflects significant improvement in pt's gait and independence with ambulation. He is now able to utilize the cane full time. However, pt's gait still reflects decreased stance time on the left LE and reduced swing on the left, which increases his risk of falls as he occasionally catches his foot. Pt will continue to require ongoing PT intervention so that he can navigate his environment with increased safety and stability.     11/2/23: Pt is a 53 y.o. male who has received 9 visits of skilled PT intervention focused on balance, gait, and strengthening intervention. Today's assessment reflects significant improvement in gait speed and 5xSTS scores, reflecting improvement in LE power. Furthermore, 2MWT w RW was progressed to 6MWT with SPC, reflecting improvements in endurance. GARZA increased from 36 to 46, reflecting improved balance. Despite this, pt still scores at risk for falls and is a limited community ambulator. Pt is limited in his progression by his LBP around his shunt, which he is trying to get addressed by his neurosurgeon, however has been unsuccessful in getting a response from him. Pt will continue to benefit from skilled PT intervention focused on balance, gait, and  strengthening intervention so that he can navigate his environment safely and without pain.     At IE: Pt is a 53 y.o. 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 Today's assessment reflects elevated falls risk and reduced CV stamina as indicated by 2MWT and ABC scoring. 5xSTS and MMTs reflect reduced LE strength and power, which contribute to pt's feeling of weakness and difficulty navigating stairs. Pt also exhibited coordination impairments which impacts his ability to correct sequence gait. IE examination limited today due to time constraints, but plan to complete additional testing at next visit. Pt will benefit from skilled physical therapy intervention addressing strength, balance, and gait deficits so that she may be able to navigate his environment safely and with increased independence.   Impairments: abnormal coordination, abnormal gait, abnormal muscle firing, abnormal muscle tone, abnormal or restricted ROM, abnormal movement, activity intolerance, difficulty understanding, impaired balance, impaired physical strength, lacks appropriate home exercise program, safety issue, poor posture  and poor body mechanics  Functional limitations: gait, balance, transfers.Understanding of Dx/Px/POC: good   Prognosis: fair    Goals  STG: To be achieved within 4 weeks  1. Pt to be independent with HEP to maximize carry over skilled PT intervention at home ONGOING  2. Pt to improve 5xSTS score by 2.3s to reflect improvement in hip extensor strength and improve STS transfers MET  3. Pt to improve gait speed by MCID stroke .14s so to reflect improvement in gait speed. MET  4. Pt to improve hip flexor, hip abductor, and hip extensor strength to 4/5 MMT to improve recruitment of hip strategy ONGOING  5. Pt to improve ankle DF strength to 4/5 MMT to reduce instances of foot drag in gait ONGOING  6. Pt to  exhibit proper sequencing with use of AD to improve ambulation and stability during gait MET  7. Pt to 2MWT by MDC of 40ft to reflect improvement in CV endurance.  MET    LT weeks  1. Pt to improve FGA score to >22/30 to reflect reduced risk of falls ONGOING  2. Pt to improve gait speed to within age matched normative values 1.39m/s at self selected speed to improve safety when crossing the street ONGOING  3. Pt to improve hip flexor, abductor, and extensor MMT to 5/5 to aid in proper recruitment of hip strategy during gait ONGOING  4. Pt to improve ankle DF MMT to 5/5 to reduce instances of foot drag during gait. ONGOING  5. Pt to improve endurance so that 6MWT can be assessed.  MET  6. Pt will be able to improve safety to begin initiation of gait training with SPC.  MET    All data taken from [facility] Neuro Section or Rehab Measures, UDPT Normative Values sheet    GARZA test: 46/56                                                    5 x STS Test:  MDC: 6 points                                                                       MDC: 2.3 seconds   age norms                                                                           Age Norms   60-69 year old = M: 55, F: 55                                             60-69 year old: 11.4 seconds   70-79 year old = M 54,  F: 53                                             70-79 year old: 12.6 seconds    80-89 year old = M53,   F: 50                                             80-89 year old: 14.8 seconds     TUG test:                                                                     10 Meter Walk Test:  MDC: 4.14 seconds                                                                MDC: .59 ft/sec  Cut off score for Falls                                                  Age Norms  > 13.5 seconds community dwelling adults                20-29; M: 4.56 ft/sec F: 4.62 ft/sec  > 32.2 Frail Elderly                                                     30-39: M 4.76 ft/sec   F: 4.68 ft/sec                                                                                                     40-49: M: 4.79 ft/sec  F: 4.62 ft/sec  6 Minute Walk Test                                                              50-59: M: 4.76 ft/sec  F: 4.56 ft/sec  MDC: 190 feet                                                                        60-69: M: 4.56 ft/sec  F: 4.26 ft/sec  Age Norms                                                                              70-+    M: 4.36 ft/sec  F: 4.16 ft/sec  60-69:    M: 1876 F: 1765  70-79:    M: 1729 F: 1545  80-89 +: M: 1368 F; 1286              4 square step test          Age matched Norms:          -Acute stroke: 20.8 seconds- 17.5 seconds          -Older adults/geriatrics: 32.6 seconds (multiple fallers)/17.6 seconds (non-fallers)          -Parkinson's: On Drug time: 9.6 secs/ Off Drug time: 11 .02secs         MCID: Not established         MDC: Not established         Cut off scores (for falls risk)          -Older adults/Geriatric: > 15 seconds          -Vestibular: > 12 seconds          -Transtibial amputations: >24 seconds at risk for falls          -Acute stroke: failed attempt or > 15 seconds          -Parkinson's disease: < 9.68 seconds      FGA  Age matched Norms  -40-49 years= 28.9/ 50-59 years=28.4  -60-69 years=27.1/70-79 years=24.9  -80/89 years=20.8  MCID: Vestibular disorders: 8 points  MDC: Parkinsons: 0.61, Stroke: 4.2 points  Cut-offs:  -Community dwelling older adults   -22/30 : predict falls (Sensitivity 85%, Specificity 86%)   -20/30 (unexplained falls in the next 6 months) (Sensitivity 100%, Specificity 76%)  -Parkinson's   -5/30 (identify fallers in Parkinson's)        Plan  Plan details: Therapeutic activities: to address functional deficits by training transfers, gait, and tasks essential for daily functioning  Therapeutic exercise: address strength, ROM, and tissue length deficits   Neuromuscular Reeducation: addresses balance  deficits, vestibular impairments, gaze stabilization, oculomotor impairments, coordination, & PNF  Manual Intervention: address joint mobility deficits, soft tissue restrictions, and pain relieving soft tissue mobilization & IASTM provided by the physical therapist.   Patient would benefit from: skilled physical therapy  Planned modality interventions: electrical stimulation/Russian stimulation, thermotherapy: hydrocollator packs and cryotherapy  Planned therapy interventions: abdominal trunk stabilization, activity modification, balance, balance/weight bearing training, body mechanics training, flexibility, functional ROM exercises, gait training, graded activity, graded exercise, home exercise program, IADL retraining, neuromuscular re-education, motor coordination training, joint mobilization, manual therapy, orthotic fitting/training, orthotic management and training, patient education, postural training, strengthening, stretching, therapeutic activities, therapeutic exercise, therapeutic training, transfer training, wheelchair management and work reintegration  Frequency: 2x week  Duration in visits: 16  Duration in weeks: 8  Plan of Care beginning date: 1/9/2024  Plan of Care expiration date: 3/5/2024  Treatment plan discussed with: patient, caregiver and family    Subjective Evaluation    History of Present Illness  Date of onset: 7/17/2023  Date of surgery: 1/3/2023  Mechanism of injury: 1/9/2023: Pt presents with SPC today, he is no longer using his quad cane. Pt states that he feels approximately 70% of the way to where he would like to be. He is limited by still feeling a bit unstable. He states that his balance is not there quite yet.     12/4/2023: Pt states that he feels about 60-70% of the way to where he expects to be when he is done with PT. He states that he feels pretty steady doing that. Stairs have gotten much easier and he feels he can fly up and down them at this point. He still doesn't feel  "secure when he walks and he still feels his left leg will get out at any time. He can finally move his toes and now he is very excited about that. He doesn't report any other situations where he feels really off balance. His next step is to stand up in the shower. \"If I can do that I'll be fine.\"     11/29/23:Pt states he and his surgeon are very impressed by the progress he is making. He states that he still struggles with curb navigation and vehicle navigation (getting into and out of his car). He is now using the quad cane primarily except for when his back is really bad.     11/2/23: Pt states that he feels about 50% of the way to where he would like to be. He states that he can do anything at his house for himself except for putting the seat in the shower. Specifically it is challenging picking it up and carrying it to the bathroom. Pt states that he feels the most unsteady walking with the cane because he has not done a lot of it, but would like to progress to the cane.     At IE: Pt states that he underwent a surgery for chiari malformation on 1/3/2023 when the surgeon accidentally cut his membrane and caused a CSF leak. He then went on to have 15 additional surgeries and two shunts placed, one in his head and one in his back. 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 He was admitted to the hospital on July 10th 2023 due to complications with the shunts. When he was in the hospital, he suffered two strokes on July 17th. Testing revealed meningitis and ventriculitis, which they feel caused the strokes. He had both a left sided and a right sided stroke. Initially, he was placed on life support due to the severity of the CVAs. However, he improved quickly. He was unable to sit up initially and had no mobility on his left side. He had inpatient rehab for 22 days. Now, he is using a rollator for short distances, and is able to shower himself with a shower chair and " "mobility aid to lift his LE. Pt notes that at this time it is still hard for him to  his left foot. He notes a little numbness in his right calf. He has had two falls since the CVA. One occurred in the inpatient facility while using the bedside urinal, and the other one occurred in his home when his RW got stuck in the bathroom. His daughter, Melissa, has since moved home with home and is his primary caretaker at this time. She supplied a portion of the subjective today. She supervises him on stairs and brings a WC in case of longer distance travels. 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 He would also like to be able to ambulate without an RW, and be able to walk for longer without limitation. They need to leave a couple minutes before 9:30am today.           Not a recurrent problem   Quality of life: fair    Patient Goals  Patient goals for therapy: increased strength, independence with ADLs/IADLs, return to sport/leisure activities, return to work, improved balance and increased motion    Pain  At best pain ratin  At worst pain ratin  Location: left leg, back.  Quality: sharp, burning, dull ache and knife-like  Relieving factors: change in position and medications  Aggravating factors: sitting  Progression: improved    Social Support  Steps to enter house: no  Stairs in house: yes   6  Lives in: multiple-level home  Lives with: adult children    Employment status: not working  Hand dominance: right    Treatments  Discharged from (in last 30 days): home health care      Objective     Strength/Myotome Testing     Left Hip   Planes of Motion   Flexion: 3+    Right Hip   Planes of Motion   Flexion: 4    Left Knee   Extension: 2+    Right Knee   Extension: 3+    Left Ankle/Foot   Dorsiflexion: " 2+    Right Ankle/Foot   Dorsiflexion: 3+  Neuro Exam:     Coordination   Heel to shin: right WNL  Heel to shin: left dysmetric  Finger to nose: left WNL and right WNL  Rapid alternating movements: UE impaired and LE impaired     Functional outcomes   5x sit to stand: 20.78s no UE support (seconds)  2 minute walk test: 168.3ft             Precautions: CVA, 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   POC Expires:03/05/2023      Objective IE 10/3 10/23 10/27 10/31 11/2: RE 11/8 11/10 11/14 11/20 11/27 12/4: RE 12/6 12/11 12/18 12/20 1/2/24 01/4/24 1/9/24   ABC 26.25%    51.88%       54.38%       68.13%   5xSTS 20.78s no UE    15.52s       12.60s       10.55s   6MWT 2MWT 168.3ft  w RW    W .3ft, 1 rest break at 3 min       1000ft w SPC no rest break       947ft w SPC   FGA           15/30          [place]     46      50          10mWT           0.49m/s        0.70m/s   Manuals                                                                                                         Neuro Re-Ed                     Gait training in SOLO  -No AD 8 laps 20ft with 3lb AW on L LE     -treadmill speed 1.0mph incline 1% 6 min 3lb AW on L LE -SOLO Treadmill speed 1.3mph incline 1% 3lb AW on L LE 6 min    -backwards walking 6x20ft no AD in SOLO -SOLO treadmill speed 1.3mph incline 1% 3lb AW on L LE 6 min -SOLO treadmill speed 1.3mph incline 1% 3lb AW on L LE 6 min -SOLO treadmill speed 1.3mph incline 1% 3lb AW on L LE 6 min -SOLO treadmill speed 1.3mph incline 2.0% 3lb AW on L LE 8 min -SOLO treadmill speed 1.3mph incline 2.0% 3lb AW on L LE 6 min with  -SOLO treadmill speed 1.5 mph incline 3.0% 6 min -SOLO treadmill speed 1.3mph incline 2.0% with 4# AW 8min -SOLO treadmill speed 1.5mph incline 2.5% with 5# AW  "9 min -SOLO Treadmill no UE support 1.1mph incline 0%with 5# AW 6 min with frequent rest breaks required -SOLO Treadmill no UE support 1.1 mph incline  0% with 5# AW 10 min frequent rest breaks required every 3.0min -SOLO treadmill no UE support 1.1mph incline 0.5%, 3.5 min rounds w 5# AW    Side stepping 0.5-0.6mph 3x30 seconds intervals, closer supervisoin and occasional contact guard CGA.  -SOLO treadmill no UE support 1.1mph incline 1.0% 3.5 minrounds with 5# AW     Side stepping 0.5-0.6mph close supervision -SOLO treadmill no UE support 1.2mph incline 1.0% 4.min rounds with 5# AW    Side stepping 0.5-0.6 mph close supervision, 3x30s intervals, close supervision -SOLO treadmill no UE support 1.2mph incline 1.0% 4 min round with 5# AW    -SOLO treadmill No UE support 2min 1.6mph 3x    Side stepping 0.5-0.6mph close supervision 3x30\" intervals close supervision    Hurdles   -In SOLO 6 hurdles middle setting no AD 3lb AW -In SOLO 6 hurdles middle setting no AD 3lb AW -In SOLO 6 hurdles middle setting no AD 3lb AW -In SOLO 6 hurdles middle setting no AD 3lb AW In SOLO 6 hurdles no AD 3lb AW:   - fwd middle setting x4 laps   -lat low setting x3 laps In SOLO 6 hurdles no AD 3lb AW    -fwd middle setting x4 laps with 3lb AW  -lat low setting x3 laps  with 3 LB AW In SOLO 6 hurdles no AD 3lb AW, fwd highest setting x4 laps with 3lb AW    -side stepping middle setting x4 laps with 3lb AW In SOLO 6 hurdles no AD 3lb AW fwd middle  setting x4 laps with 3lb AW     In SOLO 6 hurdles no AD 4lb AW fwd middle setting 4x, sideways 4x, and forward on highes t setting 2x In SOLO 6 hurdles no AD 5lb AW highest setting fwd 6x, 6x sideways.  In SOLO 6 hurdles no AD 5lb AW highest setting fwd 6x, 6x sideways In SOLO 6 hurdles no AD 5lb AW highest setting fwd 6x, 6x sideways over foam beam.  In SOLO 6 hurdles no AD 5lb AW highest setting sideways over foam beam 6x close supervision   In SOLO 6 hurdles no AD 5lb AW highest setting " sideways over foam beam 6x close supervision    Uneven surface gait    Uneven mat with AW underneath 6x full length of mat.   -Walking on foam beams (2 next to each other) fwd only x4 laps 3# AW -side stepping on foam beams x10ft with 3lb AW x5 laps -side stepping on foam beams x10ft with 3lb AW x5 laps    -uneven surface mat with AW underneath 6x length of mat no AD  Side stepping on foam beams 2 beams with 4lb AW x 6 laps     Side stepping on foam beam with 2 beams 5lb AW x 6laps Side stepping on foam with 2 beams 6lb AW x6 laps         Static balance         FT EC 3x1 min FT EC 4x1 min on foam Ft EC 4x1 min on foam  FT EC 4x1 min on foam FT EC 4x1 min on foam        Tandem gait             4x40ft with 5lb AW        Testing                     Dynamic Balance   -Standing Cone taps in SOLO 20x/LE -standing cone taps in SOLO 20x/LE     Backwards walking 3x40t            Reactive strategy training           Tapping incline board 30x forward 30x backward.           Resisted gait          Pink t-band resistance to R LE, 4lb AW L LE, with QC. 20ft 6x to resist during stance.           Tests & measures           FGA, 6MWT, ABC, 10mWT, GARZA, 5xSTS       FGA, 6MWT, ABC, 10mWT, 5xSTS, ROS   Patient education           Ongoing strength & balance deficits, importance of unilateral L Strengthening to challenge L LE, ongoing endurance deficits, plan moving forwards.          Step ups  1x8 leading with R LE lowering with L LE. Initial modA, improved to CGA by conclusion of session 2x10 leading with R LE lowering with L LE with SPC to prepare for weaning off RW. 2x10 middle setting 2x10 leading with R LE lowering with L LE with SPC middle setting. 3lb AW.   - up and down foam incline  LLE leading x5 3# AW -up and down highest setting on step with QC, occasional cueing needed for sequencing 2x10 with 3lb AW -up and down highest setting on step with QC occasional cueing needed for sequencing 2x10 with 3lb AW       2x10 bilaterally  +5lb AW on L LE no AD forced use 2x10 bilaterally 5lb AW on L LE no AD forced use holding 7lb AW bilaterally.      Ther Ex                     Split stance STS            3x10 biasing L LE         LAQ            2x10 bilaterally 10lb AW         Heel Raises                     Patient education            Review of updated HEP                                                                                              Ther Activity                     Skin inspection                     Curb transfer    With SPC supervision assistance                 AD Training    With SPC around clinic and to car                 Gait Training                                                               Modalities

## 2024-01-10 ENCOUNTER — OFFICE VISIT (OUTPATIENT)
Facility: CLINIC | Age: 54
End: 2024-01-10
Payer: COMMERCIAL

## 2024-01-10 DIAGNOSIS — I63.9 CEREBROVASCULAR ACCIDENT (CVA), UNSPECIFIED MECHANISM (HCC): Primary | ICD-10-CM

## 2024-01-10 DIAGNOSIS — G03.9 MENINGITIS: ICD-10-CM

## 2024-01-10 DIAGNOSIS — R26.2 DIFFICULTY WALKING: ICD-10-CM

## 2024-01-10 PROCEDURE — 97112 NEUROMUSCULAR REEDUCATION: CPT

## 2024-01-10 NOTE — PROGRESS NOTES
Daily Note     Today's date: 1/10/2024  Patient name: Seth Almazan  : 1970  MRN: [id number]  Referring provider: Stephanie Hurd P*  Dx:   Encounter Diagnosis     ICD-10-CM    1. Cerebrovascular accident (CVA), unspecified mechanism (HCC)  I63.9       2. Meningitis  G03.9       3. Difficulty walking  R26.2                      Subjective: Pt states that his back is bugging him.       Objective: See treatment diary below      Assessment: Pt participated in a skilled PT session focused on balance, gait, and strengthening. Side stepping on treadmill was performed for 1 minute with minimal LOB, however, reported rapid fatigue with increase in speed. Backwards walking with heavy resistance on LE was trialed however pt reported task was easy. Pt will continue to benefit from skilled PT intervention focused on balance, gait, and strengthening so that he can navigate his environment with increased safety and stability.      Plan: Continue per plan of care.      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   POC Expires:2023      Objective IE 10/3 10/23 10/27 10/31 11/2: RE 11/8 11/10 11/14 11/20 11/27 12/4: RE 24: RE 1/10   ABC 26.25%    51.88%       54.38%       68.13%    5xSTS 20.78s no UE    15.52s       12.60s       10.55s    6MWT 2MWT 168.3ft  w RW    W .3ft, 1 rest break at 3 min       1000ft w SPC no rest break       947ft w SPC    FGA           15/30           Briseno     46      50           10mWT           0.49m/s        0.70m/s    Manuals                                                                                                              Neuro Re-Ed                      Gait training in SOLO  -No AD 8  "laps 20ft with 3lb AW on L LE     -treadmill speed 1.0mph incline 1% 6 min 3lb AW on L LE -SOLO Treadmill speed 1.3mph incline 1% 3lb AW on L LE 6 min    -backwards walking 6x20ft no AD in SOLO -SOLO treadmill speed 1.3mph incline 1% 3lb AW on L LE 6 min -SOLO treadmill speed 1.3mph incline 1% 3lb AW on L LE 6 min -SOLO treadmill speed 1.3mph incline 1% 3lb AW on L LE 6 min -SOLO treadmill speed 1.3mph incline 2.0% 3lb AW on L LE 8 min -SOLO treadmill speed 1.3mph incline 2.0% 3lb AW on L LE 6 min with  -SOLO treadmill speed 1.5 mph incline 3.0% 6 min -SOLO treadmill speed 1.3mph incline 2.0% with 4# AW 8min -SOLO treadmill speed 1.5mph incline 2.5% with 5# AW 9 min -SOLO Treadmill no UE support 1.1mph incline 0%with 5# AW 6 min with frequent rest breaks required -SOLO Treadmill no UE support 1.1 mph incline  0% with 5# AW 10 min frequent rest breaks required every 3.0min -SOLO treadmill no UE support 1.1mph incline 0.5%, 3.5 min rounds w 5# AW    Side stepping 0.5-0.6mph 3x30 seconds intervals, closer supervisoin and occasional contact guard CGA.  -SOLO treadmill no UE support 1.1mph incline 1.0% 3.5 minrounds with 5# AW     Side stepping 0.5-0.6mph close supervision -SOLO treadmill no UE support 1.2mph incline 1.0% 4.min rounds with 5# AW    Side stepping 0.5-0.6 mph close supervision, 3x30s intervals, close supervision -SOLO treadmill no UE support 1.2mph incline 1.0% 4 min round with 5# AW    -SOLO treadmill No UE support 2min 1.6mph 3x    Side stepping 0.5-0.6mph close supervision 3x30\" intervals close supervision  -SOLO treadmll no UE support 1.7mph incline 1.0% 5# AW 4 rounds    -SOLO treadmill no UE support side stepping 0.9 mph 2x1 min intervals bilaterally close supervision   Hurdles   -In SOLO 6 hurdles middle setting no AD 3lb AW -In SOLO 6 hurdles middle setting no AD 3lb AW -In SOLO 6 hurdles middle setting no AD 3lb AW -In SOLO 6 hurdles middle setting no AD 3lb AW In SOLO 6 hurdles no AD 3lb AW:   - " fwd middle setting x4 laps   -lat low setting x3 laps In SOLO 6 hurdles no AD 3lb AW    -fwd middle setting x4 laps with 3lb AW  -lat low setting x3 laps  with 3 LB AW In SOLO 6 hurdles no AD 3lb AW, fwd highest setting x4 laps with 3lb AW    -side stepping middle setting x4 laps with 3lb AW In SOLO 6 hurdles no AD 3lb AW fwd middle  setting x4 laps with 3lb AW     In SOLO 6 hurdles no AD 4lb AW fwd middle setting 4x, sideways 4x, and forward on highes t setting 2x In SOLO 6 hurdles no AD 5lb AW highest setting fwd 6x, 6x sideways.  In SOLO 6 hurdles no AD 5lb AW highest setting fwd 6x, 6x sideways In SOLO 6 hurdles no AD 5lb AW highest setting fwd 6x, 6x sideways over foam beam.  In SOLO 6 hurdles no AD 5lb AW highest setting sideways over foam beam 6x close supervision   In SOLO 6 hurdles no AD 5lb AW highest setting sideways over foam beam 6x close supervision     Uneven surface gait    Uneven mat with AW underneath 6x full length of mat.   -Walking on foam beams (2 next to each other) fwd only x4 laps 3# AW -side stepping on foam beams x10ft with 3lb AW x5 laps -side stepping on foam beams x10ft with 3lb AW x5 laps    -uneven surface mat with AW underneath 6x length of mat no AD  Side stepping on foam beams 2 beams with 4lb AW x 6 laps     Side stepping on foam beam with 2 beams 5lb AW x 6laps Side stepping on foam with 2 beams 6lb AW x6 laps          Static balance         FT EC 3x1 min FT EC 4x1 min on foam Ft EC 4x1 min on foam  FT EC 4x1 min on foam FT EC 4x1 min on foam         Tandem gait             4x40ft with 5lb AW         Testing                      Dynamic Balance   -Standing Cone taps in SOLO 20x/LE -standing cone taps in SOLO 20x/LE     Backwards walking 3x40t          6x40ft backwards walking 10lb AW on L LE 3x, 10lb on R LE   Reactive strategy training           Tapping incline board 30x forward 30x backward.            Resisted gait          Pink t-band resistance to R LE, 4lb AW L LE, with QC.  20ft 6x to resist during stance.            Tests & measures           FGA, 6MWT, ABC, 10mWT, GARZA, 5xSTS       FGA, 6MWT, ABC, 10mWT, 5xSTS, ROS    Patient education           Ongoing strength & balance deficits, importance of unilateral L Strengthening to challenge L LE, ongoing endurance deficits, plan moving forwards.           Step ups  1x8 leading with R LE lowering with L LE. Initial modA, improved to CGA by conclusion of session 2x10 leading with R LE lowering with L LE with SPC to prepare for weaning off RW. 2x10 middle setting 2x10 leading with R LE lowering with L LE with SPC middle setting. 3lb AW.   - up and down foam incline  LLE leading x5 3# AW -up and down highest setting on step with QC, occasional cueing needed for sequencing 2x10 with 3lb AW -up and down highest setting on step with QC occasional cueing needed for sequencing 2x10 with 3lb AW       2x10 bilaterally +5lb AW on L LE no AD forced use 2x10 bilaterally 5lb AW on L LE no AD forced use holding 7lb AW bilaterally.       Ther Ex                      Split stance STS            3x10 biasing L LE          LAQ            2x10 bilaterally 10lb AW          Heel Raises                      Patient education            Review of updated HEP                                                                                                   Ther Activity                      Skin inspection                      Curb transfer    With SPC supervision assistance                    AD Training    With SPC around clinic and to car                  Gait Training                                                                  Modalities

## 2024-01-12 ENCOUNTER — HOSPITAL ENCOUNTER (OUTPATIENT)
Dept: MRI IMAGING | Facility: HOSPITAL | Age: 54
End: 2024-01-12
Payer: COMMERCIAL

## 2024-01-12 DIAGNOSIS — G96.198 OTHER DISORDERS OF MENINGES, NOT ELSEWHERE CLASSIFIED: ICD-10-CM

## 2024-01-12 PROCEDURE — 72148 MRI LUMBAR SPINE W/O DYE: CPT

## 2024-01-15 ENCOUNTER — OFFICE VISIT (OUTPATIENT)
Facility: CLINIC | Age: 54
End: 2024-01-15
Payer: COMMERCIAL

## 2024-01-15 ENCOUNTER — TELEPHONE (OUTPATIENT)
Dept: FAMILY MEDICINE CLINIC | Facility: CLINIC | Age: 54
End: 2024-01-15

## 2024-01-15 DIAGNOSIS — G03.9 MENINGITIS: ICD-10-CM

## 2024-01-15 DIAGNOSIS — R26.2 DIFFICULTY WALKING: ICD-10-CM

## 2024-01-15 DIAGNOSIS — I63.9 CEREBROVASCULAR ACCIDENT (CVA), UNSPECIFIED MECHANISM (HCC): Primary | ICD-10-CM

## 2024-01-15 PROCEDURE — 97112 NEUROMUSCULAR REEDUCATION: CPT

## 2024-01-15 NOTE — PROGRESS NOTES
Daily Note     Today's date: 1/15/2024  Patient name: Seth Almazan  : 1970  MRN: [id number]  Referring provider: Stephanie Hurd P*  Dx:   Encounter Diagnosis     ICD-10-CM    1. Cerebrovascular accident (CVA), unspecified mechanism (HCC)  I63.9       2. Meningitis  G03.9       3. Difficulty walking  R26.2             Start Time: 1015  Stop Time: 1100  Total time in clinic (min): 45 minutes    Subjective: Pt states that he is having significant LBP today. He had his MRI on Friday.       Objective: See treatment diary below      Assessment: Pt participated in a skilled PT session focused on balance, gait, and strengthening. Side stepping on treadmill was performed for 1 minute with minimal LOB, however, reported rapid fatigue with increase in speed. Backwards walking with heavy resistance on LE was trialed however pt reported task was easy. Side stepping was skipped today due to significance of PT's LBP as this exercise is provocative of his LBP at times. Kickstand squats and LAQ performed for LE strengthening. Pt will continue to benefit from skilled PT intervention focused on balance, gait, and strengthening so that he can navigate his environment with increased safety and stability.      Plan: Continue per plan of care.      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   POC Expires:2023      Objective IE 10/3 10/23 10/27 10/31 11/2: RE 11/8 11/10 11/14 11/20 11/27 12/4: RE 24: RE 1/10 1/15   ABC 26.25%    51.88%       54.38%       68.13%     5xSTS 20.78s no UE    15.52s       12.60s       10.55s     6MWT 2MWT 168.3ft  w RW    W .3ft, 1 rest break at 3 min       1000ft w SPC no rest break       947ft w SPC    "  FGA           15/30            Briseno     46      50            10mWT           0.49m/s        0.70m/s     Manuals                                                                                                                   Neuro Re-Ed                       Gait training in SOLO  -No AD 8 laps 20ft with 3lb AW on L LE     -treadmill speed 1.0mph incline 1% 6 min 3lb AW on L LE -SOLO Treadmill speed 1.3mph incline 1% 3lb AW on L LE 6 min    -backwards walking 6x20ft no AD in SOLO -SOLO treadmill speed 1.3mph incline 1% 3lb AW on L LE 6 min -SOLO treadmill speed 1.3mph incline 1% 3lb AW on L LE 6 min -SOLO treadmill speed 1.3mph incline 1% 3lb AW on L LE 6 min -SOLO treadmill speed 1.3mph incline 2.0% 3lb AW on L LE 8 min -SOLO treadmill speed 1.3mph incline 2.0% 3lb AW on L LE 6 min with  -SOLO treadmill speed 1.5 mph incline 3.0% 6 min -SOLO treadmill speed 1.3mph incline 2.0% with 4# AW 8min -SOLO treadmill speed 1.5mph incline 2.5% with 5# AW 9 min -SOLO Treadmill no UE support 1.1mph incline 0%with 5# AW 6 min with frequent rest breaks required -SOLO Treadmill no UE support 1.1 mph incline  0% with 5# AW 10 min frequent rest breaks required every 3.0min -SOLO treadmill no UE support 1.1mph incline 0.5%, 3.5 min rounds w 5# AW    Side stepping 0.5-0.6mph 3x30 seconds intervals, closer supervisoin and occasional contact guard CGA.  -SOLO treadmill no UE support 1.1mph incline 1.0% 3.5 minrounds with 5# AW     Side stepping 0.5-0.6mph close supervision -SOLO treadmill no UE support 1.2mph incline 1.0% 4.min rounds with 5# AW    Side stepping 0.5-0.6 mph close supervision, 3x30s intervals, close supervision -SOLO treadmill no UE support 1.2mph incline 1.0% 4 min round with 5# AW    -SOLO treadmill No UE support 2min 1.6mph 3x    Side stepping 0.5-0.6mph close supervision 3x30\" intervals close supervision  -SOLO treadmll no UE support 1.7mph incline 1.0% 5# AW 4 rounds    -SOLO treadmill no UE support side " stepping 0.9 mph 2x1 min intervals bilaterally close supervision -Solo treadmill no UE support 1.7mph incline 2.0% 5# 2 min rounds 4x increased LBP.    Hurdles   -In SOLO 6 hurdles middle setting no AD 3lb AW -In SOLO 6 hurdles middle setting no AD 3lb AW -In SOLO 6 hurdles middle setting no AD 3lb AW -In SOLO 6 hurdles middle setting no AD 3lb AW In SOLO 6 hurdles no AD 3lb AW:   - fwd middle setting x4 laps   -lat low setting x3 laps In SOLO 6 hurdles no AD 3lb AW    -fwd middle setting x4 laps with 3lb AW  -lat low setting x3 laps  with 3 LB AW In SOLO 6 hurdles no AD 3lb AW, fwd highest setting x4 laps with 3lb AW    -side stepping middle setting x4 laps with 3lb AW In SOLO 6 hurdles no AD 3lb AW fwd middle  setting x4 laps with 3lb AW     In SOLO 6 hurdles no AD 4lb AW fwd middle setting 4x, sideways 4x, and forward on highes t setting 2x In SOLO 6 hurdles no AD 5lb AW highest setting fwd 6x, 6x sideways.  In SOLO 6 hurdles no AD 5lb AW highest setting fwd 6x, 6x sideways In SOLO 6 hurdles no AD 5lb AW highest setting fwd 6x, 6x sideways over foam beam.  In SOLO 6 hurdles no AD 5lb AW highest setting sideways over foam beam 6x close supervision   In SOLO 6 hurdles no AD 5lb AW highest setting sideways over foam beam 6x close supervision      Uneven surface gait    Uneven mat with AW underneath 6x full length of mat.   -Walking on foam beams (2 next to each other) fwd only x4 laps 3# AW -side stepping on foam beams x10ft with 3lb AW x5 laps -side stepping on foam beams x10ft with 3lb AW x5 laps    -uneven surface mat with AW underneath 6x length of mat no AD  Side stepping on foam beams 2 beams with 4lb AW x 6 laps     Side stepping on foam beam with 2 beams 5lb AW x 6laps Side stepping on foam with 2 beams 6lb AW x6 laps           Static balance         FT EC 3x1 min FT EC 4x1 min on foam Ft EC 4x1 min on foam  FT EC 4x1 min on foam FT EC 4x1 min on foam       FT EC 4x 1 min   Tandem gait             4x40ft  with 5lb AW          Testing                       Dynamic Balance   -Standing Cone taps in SOLO 20x/LE -standing cone taps in SOLO 20x/LE     Backwards walking 3x40t          6x40ft backwards walking 10lb AW on L LE 3x, 10lb on R LE    Reactive strategy training           Tapping incline board 30x forward 30x backward.             Resisted gait          Pink t-band resistance to R LE, 4lb AW L LE, with QC. 20ft 6x to resist during stance.             Tests & measures           FGA, 6MWT, ABC, 10mWT, GARZA, 5xSTS       FGA, 6MWT, ABC, 10mWT, 5xSTS, ROS     Patient education           Ongoing strength & balance deficits, importance of unilateral L Strengthening to challenge L LE, ongoing endurance deficits, plan moving forwards.            Step ups  1x8 leading with R LE lowering with L LE. Initial modA, improved to CGA by conclusion of session 2x10 leading with R LE lowering with L LE with SPC to prepare for weaning off RW. 2x10 middle setting 2x10 leading with R LE lowering with L LE with SPC middle setting. 3lb AW.   - up and down foam incline  LLE leading x5 3# AW -up and down highest setting on step with QC, occasional cueing needed for sequencing 2x10 with 3lb AW -up and down highest setting on step with QC occasional cueing needed for sequencing 2x10 with 3lb AW       2x10 bilaterally +5lb AW on L LE no AD forced use 2x10 bilaterally 5lb AW on L LE no AD forced use holding 7lb AW bilaterally.        Ther Ex                       Split stance STS            3x10 biasing L LE        3x10 biasing L LE   LAQ            2x10 bilaterally 10lb AW        3x10 bilaterally 15lb AW   Heel Raises                       Patient education            Review of updated HEP                                                                                                        Ther Activity                       Skin inspection                       Curb transfer    With SPC supervision assistance                     AD Training     With SPC around clinic and to car                   Gait Training                                                                     Modalities

## 2024-01-15 NOTE — TELEPHONE ENCOUNTER
Kavya from [facility] is looking for the signed plan of care that is in the brown folder on your desk.  She said it is urgent that you sign it as soon as possible.

## 2024-01-17 ENCOUNTER — APPOINTMENT (OUTPATIENT)
Facility: CLINIC | Age: 54
End: 2024-01-17
Payer: COMMERCIAL

## 2024-01-18 DIAGNOSIS — F43.23 SITUATIONAL MIXED ANXIETY AND DEPRESSIVE DISORDER: ICD-10-CM

## 2024-01-18 RX ORDER — HYDROXYZINE HYDROCHLORIDE 25 MG/1
TABLET, FILM COATED ORAL
Qty: 30 TABLET | Refills: 0 | Status: SHIPPED | OUTPATIENT
Start: 2024-01-18

## 2024-01-22 ENCOUNTER — APPOINTMENT (OUTPATIENT)
Facility: CLINIC | Age: 54
End: 2024-01-22
Payer: COMMERCIAL

## 2024-01-23 ENCOUNTER — APPOINTMENT (OUTPATIENT)
Facility: CLINIC | Age: 54
End: 2024-01-23
Payer: COMMERCIAL

## 2024-01-24 ENCOUNTER — APPOINTMENT (OUTPATIENT)
Facility: CLINIC | Age: 54
End: 2024-01-24
Payer: COMMERCIAL

## 2024-01-29 ENCOUNTER — OFFICE VISIT (OUTPATIENT)
Facility: CLINIC | Age: 54
End: 2024-01-29
Payer: COMMERCIAL

## 2024-01-29 DIAGNOSIS — I63.9 CEREBROVASCULAR ACCIDENT (CVA), UNSPECIFIED MECHANISM (HCC): Primary | ICD-10-CM

## 2024-01-29 DIAGNOSIS — G03.9 MENINGITIS: ICD-10-CM

## 2024-01-29 DIAGNOSIS — R26.2 DIFFICULTY WALKING: ICD-10-CM

## 2024-01-29 PROCEDURE — 97110 THERAPEUTIC EXERCISES: CPT

## 2024-01-29 PROCEDURE — 97112 NEUROMUSCULAR REEDUCATION: CPT

## 2024-01-29 NOTE — PROGRESS NOTES
"Daily Note     Today's date: 2024  Patient name: Seth Almazan  : 1970  MRN: [id number]  Referring provider: Stephanie Hurd P*  Dx:   Encounter Diagnosis     ICD-10-CM    1. Cerebrovascular accident (CVA), unspecified mechanism (HCC)  I63.9       2. Meningitis  G03.9       3. Difficulty walking  R26.2                          Subjective: Pt reports that his MRI showed significant DDD at multiple levels and his neurosurgeon wants him to see a spinal doctor. He notes that he got a letter from insurance about having limited visits left. His back is \"ripping\" today.       Objective: See treatment diary below      Assessment: Pt participated in a skilled PT session focused on balance, gait, and strengthening. Due to increased LBP, held treadmill intervention. Performed speed walking with 15lb AW over ground without AD to challenge CV system. Added in gait with intermittent perturbations to simulate dog walking tasks. Assessed stair navigation as pt reports ongoing unsteadiness with stairs, pt exhibited minimal eccentric control. Reviewed sequencing for SPC, pt able to perform independently.  Pt will continue to benefit from skilled PT intervention focused on balance, gait, and strengthening so that he can navigate his environment with increased safety and stability.      Plan: Continue per plan of care.      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   POC Expires:2023      Objective IE 10/3 10/23 10/27 10/31 11/2: RE 11/8 11/10 11/14 11/20 11/27 12/4: RE 24: RE 1/10 1/15 1/29   ABC 26.25%    51.88%       54.38%       68.13%      5xSTS 20.78s no UE    15.52s       12.60s       10.55s      6MWT 2MWT 168.3ft  w RW    W " ".3ft, 1 rest break at 3 min       1000ft w SPC no rest break       947ft w [facility]      FGA           15/30             [place]     46      50             10mWT           0.49m/s        0.70m/s      Manuals                                                                                                                        Neuro Re-Ed                        Gait training in SOLO  -No AD 8 laps 20ft with 3lb AW on L LE     -treadmill speed 1.0mph incline 1% 6 min 3lb AW on L LE -SOLO Treadmill speed 1.3mph incline 1% 3lb AW on L LE 6 min    -backwards walking 6x20ft no AD in SOLO -SOLO treadmill speed 1.3mph incline 1% 3lb AW on L LE 6 min -SOLO treadmill speed 1.3mph incline 1% 3lb AW on L LE 6 min -SOLO treadmill speed 1.3mph incline 1% 3lb AW on L LE 6 min -SOLO treadmill speed 1.3mph incline 2.0% 3lb AW on L LE 8 min -SOLO treadmill speed 1.3mph incline 2.0% 3lb AW on L LE 6 min with  -SOLO treadmill speed 1.5 mph incline 3.0% 6 min -SOLO treadmill speed 1.3mph incline 2.0% with 4# AW 8min -SOLO treadmill speed 1.5mph incline 2.5% with 5# AW 9 min -SOLO Treadmill no UE support 1.1mph incline 0%with 5# AW 6 min with frequent rest breaks required -SOLO Treadmill no UE support 1.1 mph incline  0% with 5# AW 10 min frequent rest breaks required every 3.0min -SOLO treadmill no UE support 1.1mph incline 0.5%, 3.5 min rounds w 5# AW    Side stepping 0.5-0.6mph 3x30 seconds intervals, closer supervisoin and occasional contact guard CGA.  -SOLO treadmill no UE support 1.1mph incline 1.0% 3.5 minrounds with 5# AW     Side stepping 0.5-0.6mph close supervision -SOLO treadmill no UE support 1.2mph incline 1.0% 4.min rounds with 5# AW    Side stepping 0.5-0.6 mph close supervision, 3x30s intervals, close supervision -SOLO treadmill no UE support 1.2mph incline 1.0% 4 min round with 5# AW    -SOLO treadmill No UE support 2min 1.6mph 3x    Side stepping 0.5-0.6mph close supervision 3x30\" intervals close supervision  -SOLO " treadmll no UE support 1.7mph incline 1.0% 5# AW 4 rounds    -SOLO treadmill no UE support side stepping 0.9 mph 2x1 min intervals bilaterally close supervision -Solo treadmill no UE support 1.7mph incline 2.0% 5# 2 min rounds 4x increased LBP.     Hurdles   -In SOLO 6 hurdles middle setting no AD 3lb AW -In SOLO 6 hurdles middle setting no AD 3lb AW -In SOLO 6 hurdles middle setting no AD 3lb AW -In SOLO 6 hurdles middle setting no AD 3lb AW In SOLO 6 hurdles no AD 3lb AW:   - fwd middle setting x4 laps   -lat low setting x3 laps In SOLO 6 hurdles no AD 3lb AW    -fwd middle setting x4 laps with 3lb AW  -lat low setting x3 laps  with 3 LB AW In SOLO 6 hurdles no AD 3lb AW, fwd highest setting x4 laps with 3lb AW    -side stepping middle setting x4 laps with 3lb AW In SOLO 6 hurdles no AD 3lb AW fwd middle  setting x4 laps with 3lb AW     In SOLO 6 hurdles no AD 4lb AW fwd middle setting 4x, sideways 4x, and forward on highes t setting 2x In SOLO 6 hurdles no AD 5lb AW highest setting fwd 6x, 6x sideways.  In SOLO 6 hurdles no AD 5lb AW highest setting fwd 6x, 6x sideways In SOLO 6 hurdles no AD 5lb AW highest setting fwd 6x, 6x sideways over foam beam.  In SOLO 6 hurdles no AD 5lb AW highest setting sideways over foam beam 6x close supervision   In SOLO 6 hurdles no AD 5lb AW highest setting sideways over foam beam 6x close supervision    In SOLO 6 hurdles no AD 7.5LB on L LE 5x leading with L LE 5x leading with R LE   Uneven surface gait    Uneven mat with AW underneath 6x full length of mat.   -Walking on foam beams (2 next to each other) fwd only x4 laps 3# AW -side stepping on foam beams x10ft with 3lb AW x5 laps -side stepping on foam beams x10ft with 3lb AW x5 laps    -uneven surface mat with AW underneath 6x length of mat no AD  Side stepping on foam beams 2 beams with 4lb AW x 6 laps     Side stepping on foam beam with 2 beams 5lb AW x 6laps Side stepping on foam with 2 beams 6lb AW x6 laps         -Forward  stepping on foam beam carrying bolster, 7.5lb AW bilaterally   Static balance         FT EC 3x1 min FT EC 4x1 min on foam Ft EC 4x1 min on foam  FT EC 4x1 min on foam FT EC 4x1 min on foam       FT EC 4x 1 min    Tandem gait             4x40ft with 5lb AW           Testing                        Dynamic Balance   -Standing Cone taps in SOLO 20x/LE -standing cone taps in SOLO 20x/LE     Backwards walking 3x40t          6x40ft backwards walking 10lb AW on L LE 3x, 10lb on R LE     Reactive strategy training           Tapping incline board 30x forward 30x backward.           Walkign with pulling theraband to mimic dog walking, 30x   Resisted gait          Pink t-band resistance to R LE, 4lb AW L LE, with QC. 20ft 6x to resist during stance.           15lb AW bilaterally on each LE 1 min speed rounds 4x.    Tests & measures           FGA, 6MWT, ABC, 10mWT, GARZA, 5xSTS       FGA, 6MWT, ABC, 10mWT, 5xSTS, ROS      Patient education           Ongoing strength & balance deficits, importance of unilateral L Strengthening to challenge L LE, ongoing endurance deficits, plan moving forwards.             Step ups  1x8 leading with R LE lowering with L LE. Initial modA, improved to CGA by conclusion of session 2x10 leading with R LE lowering with L LE with SPC to prepare for weaning off RW. 2x10 middle setting 2x10 leading with R LE lowering with L LE with SPC middle setting. 3lb AW.   - up and down foam incline  LLE leading x5 3# AW -up and down highest setting on step with QC, occasional cueing needed for sequencing 2x10 with 3lb AW -up and down highest setting on step with QC occasional cueing needed for sequencing 2x10 with 3lb AW       2x10 bilaterally +5lb AW on L LE no AD forced use 2x10 bilaterally 5lb AW on L LE no AD forced use holding 7lb AW bilaterally.         Ther Ex                        Split stance STS            3x10 biasing L LE        3x10 biasing L LE 3x10 biasing L LE holding 10lb KB on L Side   LAQ             2x10 bilaterally 10lb AW        3x10 bilaterally 15lb AW    Heel Raises                        Patient education            Review of updated HEP             Stair navigation                     W SPC 2x up and 2x down step through, practicing sequencing                                                                           Ther Activity                        Skin inspection                        Curb transfer    With SPC supervision assistance                      AD Training    With SPC around clinic and to car                    Gait Training                                                                        Modalities

## 2024-01-31 ENCOUNTER — EVALUATION (OUTPATIENT)
Facility: CLINIC | Age: 54
End: 2024-01-31
Payer: COMMERCIAL

## 2024-01-31 DIAGNOSIS — G03.9 MENINGITIS: ICD-10-CM

## 2024-01-31 DIAGNOSIS — R26.2 DIFFICULTY WALKING: ICD-10-CM

## 2024-01-31 DIAGNOSIS — I63.9 CEREBROVASCULAR ACCIDENT (CVA), UNSPECIFIED MECHANISM (HCC): Primary | ICD-10-CM

## 2024-01-31 PROCEDURE — 97112 NEUROMUSCULAR REEDUCATION: CPT

## 2024-01-31 NOTE — PROGRESS NOTES
PT Re-Evaluation     Today's date: 2024  Patient name: Seth Almazan  : 1970  MRN: [id number]  Referring provider: Stephanie Hurd P*  Dx:   Encounter Diagnosis     ICD-10-CM    1. Cerebrovascular accident (CVA), unspecified mechanism (HCC)  I63.9       2. Meningitis  G03.9       3. Difficulty walking  R26.2                          Assessment  Assessment details: 2024: Pt is a 53 y.o. male who has received skilled PT intervention focused on balance, gait, and strengthening intervention to address deficits s/p CVA & meningitis due to chiari malformation repair surgery. Today's assessment reflects significant improvement in pt's CV endurance and gait tolerance as noted by improvement in 6MWT to >1000ft, which is a significant improvement from previous assessment. Furthermore, pt's 5xSTS and gait speed also significantly improved with and without AD. Pt's current gait speed with and without AD now classifies him as a community ambulator. Despite these improvements, pt's 6MWT is still significantly below aged matched normative values, and is completed with an AD. Pt's goal is to ambulate without an AD. Furthermore, FGA & ABC scores place him at risk for falls. Pt is at a higher risk of injury from falls due to craniectomy without replacement of bone. This, coupled with the fact that pt had a fall yesterday, indicates that it is essential for the pt to receive ongoing physical therapy intervention to address balance deficits. Most noteably, pt notes the greatest deficits with stair navigation, donning and doffing pants & socks, and has ongoing concerns about his left leg buckling. Pt is very hardworking and completes all exercises as directed. Ongoing physical therapy intervention is essential for him to return to work, complete dressing tasks without adaptive equipment, and navigate his environment without increased risk of falls.      2024: Pt is a 53 y.o. male who has received skilled PT  intervention focused on balance, gait, and strengthening intervention to address deficits s/p CVA & meningitis due to chiari malformation repair surgery. Today's assessment reflects significant improvement in pt's dynamic balance, LE power, and gait speed as compared to previous visit. 6MWT score was reduced, however, pt was utilizing a less supportive device during today's session as compared to previous session (using an SPC today and a QPC previously). FGA was performed without AD and pt exhibited improvement compared to his last assessment with AD use.  Despite these improvements, pt still suffers from significant LBP, which impacts his ability to ambulate long distances. Furthermore, 6MWT score continues to reflect significant CV deficits, and FGA places him at risk for falls. Gait speed places him at a limited community ambulator. Pt will therefore continue to benefit from skilled PT intervention focused on balance, gait, and strengthening so that he can navigate his environment with increased safety and stability.     12/4/2023: Pt is a 53 y.o. male who has received 16 visits of skilled PT intervention focused on balance, gait, and strengthening intervention. Today's assessment reflects statistically significant improvement in LE power, CV fitness, and balance as indicated by improvement in 5xSTS, 6MWT, and GARZA score. FGA added today to assess pt's dynamic balance and pt scored 15/30, reflecting ongoing increased risk of falls. Balance confidence also still reflects increased risk of falls as noted by ABC <67%. Pt notes ongoing difficulty with L LE strength and concerns surrounding buckling, so planning to focus on unilateral strengthening to aid in improving L LE strength and power. Descending stairs also continues to be a challenge as noted by reduced eccentric control lowering with the L LE, and requires ongoing PT so he can navigate the stairs in his home. Pt will continue to benefit from skilled PT  intervention focused on balance, gait, and strengthening so that he can navigate his environment safely and with increased stability.     11/29/23: Pt is a 53 y.o. male who has received 15 visits of skilled PT intervention focused on balance, gait, and strengthening intervention. Today's visit reflects significant improvement in pt's gait and independence with ambulation. He is now able to utilize the cane full time. However, pt's gait still reflects decreased stance time on the left LE and reduced swing on the left, which increases his risk of falls as he occasionally catches his foot. Pt will continue to require ongoing PT intervention so that he can navigate his environment with increased safety and stability.     11/2/23: Pt is a 53 y.o. male who has received 9 visits of skilled PT intervention focused on balance, gait, and strengthening intervention. Today's assessment reflects significant improvement in gait speed and 5xSTS scores, reflecting improvement in LE power. Furthermore, 2MWT w RW was progressed to 6MWT with SPC, reflecting improvements in endurance. GARZA increased from 36 to 46, reflecting improved balance. Despite this, pt still scores at risk for falls and is a limited community ambulator. Pt is limited in his progression by his LBP around his shunt, which he is trying to get addressed by his neurosurgeon, however has been unsuccessful in getting a response from him. Pt will continue to benefit from skilled PT intervention focused on balance, gait, and strengthening intervention so that he can navigate his environment safely and without pain.     At IE: Pt is a 53 y.o. 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 Today's assessment reflects elevated falls risk and reduced CV stamina as indicated by 2MWT and ABC scoring. 5xSTS and MMTs reflect reduced LE strength and power, which  contribute to pt's feeling of weakness and difficulty navigating stairs. Pt also exhibited coordination impairments which impacts his ability to correct sequence gait. IE examination limited today due to time constraints, but plan to complete additional testing at next visit. Pt will benefit from skilled physical therapy intervention addressing strength, balance, and gait deficits so that she may be able to navigate his environment safely and with increased independence.   Impairments: abnormal coordination, abnormal gait, abnormal muscle firing, abnormal muscle tone, abnormal or restricted ROM, abnormal movement, activity intolerance, difficulty understanding, impaired balance, impaired physical strength, lacks appropriate home exercise program, safety issue, poor posture  and poor body mechanics  Functional limitations: gait, balance, transfers.Barriers to therapy: Insurance limitations  Understanding of Dx/Px/POC: good   Prognosis: fair  Prognosis details: Pt is exceptionally hard working and has a good home support system. While his low back pain does impair his prognosis, he is still within the timeline for maximal neuroplasticity for recovering from a neurological event, and stands to make further improvement. Not participating in physical therapy services would be detrimental to his safety and functioning.     Goals  STG: To be achieved within 4 weeks  1. Pt to be independent with HEP to maximize carry over skilled PT intervention at home ONGOING  2. Pt to improve 5xSTS score by 2.3s to reflect improvement in hip extensor strength and improve STS transfers MET  3. Pt to improve gait speed by MCID stroke .14s so to reflect improvement in gait speed. MET  4. Pt to improve hip flexor, hip abductor, and hip extensor strength to 4/5 MMT to improve recruitment of hip strategy ONGOING  5. Pt to improve ankle DF strength to 4/5 MMT to reduce instances of foot drag in gait ONGOING  6. Pt to exhibit proper sequencing  with use of AD to improve ambulation and stability during gait MET  7. Pt to 2MWT by MDC of 40ft to reflect improvement in CV endurance.  MET    LT weeks  1. Pt to improve FGA score to >22/30 to reflect reduced risk of falls ONGOING  2. Pt to improve gait speed to within age matched normative values 1.39m/s at self selected speed to improve safety when crossing the street ONGOING  3. Pt to improve hip flexor, abductor, and extensor MMT to 5/5 to aid in proper recruitment of hip strategy during gait ONGOING  4. Pt to improve ankle DF MMT to 5/5 to reduce instances of foot drag during gait. ONGOING  5. Pt to improve endurance so that 6MWT can be assessed.  MET  6. Pt will be able to improve safety to begin initiation of gait training with SPC.  MET    New Goals 2024:   1. Pt will report 10/10 confidence with stair navigation ascending and descending with a hand rail in 8 weeks  2. Pt will improve 6MWT score by stroke MCID of 112.86ft to reflect improvement in CV endurance.   3. Pt will improve Self Selected gait speed to >1.2m/s with AD so that he can safely cross a street  4. Pt will report improvement in confidence with donning his pants by 50% so that he can don and doff clothing more safely.     All data taken from [facility] Section or Rehab Measures, UDPT Normative Values sheet    GARZA test: 46/56                                                    5 x STS Test:  MDC: 6 points                                                                       MDC: 2.3 seconds   age norms                                                                           Age Norms   60-69 year old = M: 55, F: 55                                             60-69 year old: 11.4 seconds   70-79 year old = M 54,  F: 53                                             70-79 year old: 12.6 seconds    80-89 year old = M53,   F: 50                                             80-89 year old: 14.8 seconds     TUG test:                                                                      10 Meter Walk Test:  MDC: 4.14 seconds                                                                MDC: .59 ft/sec  Cut off score for Falls                                                  Age Norms  > 13.5 seconds community dwelling adults                20-29; M: 4.56 ft/sec F: 4.62 ft/sec  > 32.2 Frail Elderly                                                     30-39: M 4.76 ft/sec  F: 4.68 ft/sec                                                                                                     40-49: M: 4.79 ft/sec  F: 4.62 ft/sec  6 Minute Walk Test                                                              50-59: M: 4.76 ft/sec  F: 4.56 ft/sec  MDC: 190 feet                                                                        60-69: M: 4.56 ft/sec  F: 4.26 ft/sec  Age Norms                                                                              70-+    M: 4.36 ft/sec  F: 4.16 ft/sec  60-69:    M: 1876 F: 1765  70-79:    M: 1729 F: 1545  80-89 +: M: 1368 F; 1286              4 square step test          Age matched Norms:          -Acute stroke: 20.8 seconds- 17.5 seconds          -Older adults/geriatrics: 32.6 seconds (multiple fallers)/17.6 seconds (non-fallers)          -Parkinson's: On Drug time: 9.6 secs/ Off Drug time: 11 .02secs         MCID: Not established         MDC: Not established         Cut off scores (for falls risk)          -Older adults/Geriatric: > 15 seconds          -Vestibular: > 12 seconds          -Transtibial amputations: >24 seconds at risk for falls          -Acute stroke: failed attempt or > 15 seconds          -Parkinson's disease: < 9.68 seconds      FGA  Age matched Norms  -40-49 years= 28.9/ 50-59 years=28.4  -60-69 years=27.1/70-79 years=24.9  -80/89 years=20.8  MCID: Vestibular disorders: 8 points  MDC: Parkinsons: 0.61, Stroke: 4.2 points  Cut-offs:  -Community dwelling older adults   -22/30 : predict falls (Sensitivity  85%, Specificity 86%)   -20/30 (unexplained falls in the next 6 months) (Sensitivity 100%, Specificity 76%)  -Parkinson's   -5/30 (identify fallers in Parkinson's)        Plan  Plan details: Therapeutic activities: to address functional deficits by training transfers, gait, and tasks essential for daily functioning  Therapeutic exercise: address strength, ROM, and tissue length deficits   Neuromuscular Reeducation: addresses balance deficits, vestibular impairments, gaze stabilization, oculomotor impairments, coordination, & PNF  Manual Intervention: address joint mobility deficits, soft tissue restrictions, and pain relieving soft tissue mobilization & IASTM provided by the physical therapist.   Patient would benefit from: skilled physical therapy  Planned modality interventions: electrical stimulation/Russian stimulation, thermotherapy: hydrocollator packs and cryotherapy  Planned therapy interventions: abdominal trunk stabilization, activity modification, balance, balance/weight bearing training, body mechanics training, flexibility, functional ROM exercises, gait training, graded activity, graded exercise, home exercise program, IADL retraining, neuromuscular re-education, motor coordination training, joint mobilization, manual therapy, orthotic fitting/training, orthotic management and training, patient education, postural training, strengthening, stretching, therapeutic activities, therapeutic exercise, therapeutic training, transfer training, wheelchair management and work reintegration  Frequency: 2x week  Duration in visits: 16  Duration in weeks: 8  Plan of Care beginning date: 1/9/2024  Plan of Care expiration date: 3/5/2024  Treatment plan discussed with: patient, caregiver and family      Subjective Evaluation    History of Present Illness  Date of onset: 7/17/2023  Date of surgery: 1/3/2023  Mechanism of injury: 1/31/2024: Pt states that he feels 70% of the way to where he would like to be. He feels  "limited by left leg control and stair navigation. He can walk around his house without the cane, but cannot do long distances or shopping without the cane. The numbness in his left foot is getting a lot better. His right leg feels more numb than it has been and his LBP has been worse. His is still unable to dress without adaptive equipment, including a grabber tool and a sock aid. Donning pants and socks in particular are the most difficult. Pt cannot tie his shoes. He fell yesterday on the stairs, he went to sit down and he lost control of the railing. He tipped backwards and hit the back of his head where he does not have his skull. He was lightheaded in the moment and has some tenderness today, but otherwise he is doing okay. He also reports ongoing endurance deficits. He is unable to do the dishes without taking a rest break.     1/9/2024: Pt presents with SPC today, he is no longer using his quad cane. Pt states that he feels approximately 70% of the way to where he would like to be. He is limited by still feeling a bit unstable. He states that his balance is not there quite yet.     12/4/2023: Pt states that he feels about 60-70% of the way to where he expects to be when he is done with PT. He states that he feels pretty steady doing that. Stairs have gotten much easier and he feels he can fly up and down them at this point. He still doesn't feel secure when he walks and he still feels his left leg will get out at any time. He can finally move his toes and now he is very excited about that. He doesn't report any other situations where he feels really off balance. His next step is to stand up in the shower. \"If I can do that I'll be fine.\"     11/29/23:Pt states he and his surgeon are very impressed by the progress he is making. He states that he still struggles with curb navigation and vehicle navigation (getting into and out of his car). He is now using the quad cane primarily except for when his back is " "really bad.     11/2/23: Pt states that he feels about 50% of the way to where he would like to be. He states that he can do anything at his house for himself except for putting the seat in the shower. Specifically it is challenging picking it up and carrying it to the bathroom. Pt states that he feels the most unsteady walking with the cane because he has not done a lot of it, but would like to progress to the cane.     At IE: Pt states that he underwent a surgery for chiari malformation on 1/3/2023 when the surgeon accidentally cut his membrane and caused a CSF leak. He then went on to have 15 additional surgeries and two shunts placed, one in his head and one in his back. 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 He was admitted to the hospital on July 10th 2023 due to complications with the shunts. When he was in the hospital, he suffered two strokes on July 17th. Testing revealed meningitis and ventriculitis, which they feel caused the strokes. He had both a left sided and a right sided stroke. Initially, he was placed on life support due to the severity of the CVAs. However, he improved quickly. He was unable to sit up initially and had no mobility on his left side. He had inpatient rehab for 22 days. Now, he is using a rollator for short distances, and is able to shower himself with a shower chair and mobility aid to lift his LE. Pt notes that at this time it is still hard for him to  his left foot. He notes a little numbness in his right calf. He has had two falls since the CVA. One occurred in the inpatient facility while using the bedside urinal, and the other one occurred in his home when his RW got stuck in the bathroom. His daughter, Melissa, has since moved home with home and is his primary caretaker at this time. She supplied a portion of the subjective today. She supervises him on stairs and brings a WC in case of longer distance travels. 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 He would also like to be able to ambulate without an RW, and be able to walk for longer without limitation. They need to leave a couple minutes before 9:30am today.           Not a recurrent problem   Quality of life: fair    Patient Goals  Patient goals for therapy: increased strength, independence with ADLs/IADLs, return to sport/leisure activities, return to work, improved balance and increased motion  Patient goal: to go back to work, to have more power in his legs, do dishes without taking breaks.  Pain  At best pain ratin  At worst pain ratin  Location: left leg, back.  Quality: sharp, burning, dull ache and knife-like  Relieving factors: change in position and medications  Aggravating factors: sitting  Progression: improved    Social Support  Steps to enter house: no  Stairs in house: yes   6  Lives in: multiple-level home  Lives with: adult children    Employment status: not working  Hand dominance: right    Treatments  Discharged from (in last 30 days): home health care        Objective     Strength/Myotome Testing     Left Hip   Planes of Motion   Flexion: 3+    Right Hip   Planes of Motion   Flexion: 4    Left Knee   Extension: 2+    Right Knee   Extension: 3+    Left Ankle/Foot   Dorsiflexion: 2+    Right Ankle/Foot   Dorsiflexion: 3+  Neuro Exam:     Coordination   Heel to shin: right WNL  Heel to shin: left dysmetric  Finger to nose: left WNL and right WNL  Rapid alternating movements: UE impaired and LE impaired     Functional outcomes   5x sit to stand: 20.78s no UE support (seconds)  2 minute walk test: 168.3ft             Precautions: CVA, 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   POC Expires:03/05/2023      Objective IE 10/3 10/23 10/27 10/31 11/2: RE 11/8 11/10 11/14 11/20 11/27 12/4: RE 12/6 12/11 12/18 12/20 1/2/24 01/4/24 1/9/24: RE 1/10 1/15 1/29 1/31   ABC 26.25%    51.88%       54.38%       68.13%    62.5%   5xSTS 20.78s no UE    15.52s       12.60s       10.55s    9.42s   6MWT 2MWT 168.3ft  w RW    W .3ft, 1 rest break at 3 min       1000ft w SPC no rest break       947ft w SPC    1,299ft   FGA           15/30           19/30   [place]     46      50              10mWT           0.49m/s        0.70m/s    With SPC: 0.97m/s    Without AD: 0.99   Manuals                                                                                                                             Neuro Re-Ed                         Gait training in SOLO  -No AD 8 laps 20ft with 3lb AW on L LE     -treadmill speed 1.0mph incline 1% 6 min 3lb AW on L LE -SOLO Treadmill speed 1.3mph incline 1% 3lb AW on L LE 6 min    -backwards walking 6x20ft no AD in SOLO -SOLO treadmill speed 1.3mph incline 1% 3lb AW on L LE 6 min -SOLO treadmill speed 1.3mph incline 1% 3lb AW on L LE 6 min -SOLO treadmill speed 1.3mph incline 1% 3lb AW on L LE 6 min -SOLO treadmill speed 1.3mph incline 2.0% 3lb AW on L LE 8 min -SOLO treadmill speed 1.3mph incline 2.0% 3lb AW on L LE 6 min with  -SOLO treadmill speed 1.5 mph incline 3.0% 6 min -SOLO treadmill speed 1.3mph incline 2.0% with 4# AW 8min -SOLO treadmill speed 1.5mph incline 2.5% with 5# AW 9 min -SOLO Treadmill no UE support 1.1mph incline 0%with 5# AW 6 min with frequent rest breaks required -SOLO Treadmill no UE support 1.1 mph incline  0% with 5# AW 10 min frequent rest breaks required every 3.0min -SOLO treadmill no UE support 1.1mph incline 0.5%, 3.5 min rounds w 5# AW    Side stepping 0.5-0.6mph 3x30 seconds  "intervals, closer supervisoin and occasional contact guard CGA.  -SOLO treadmill no UE support 1.1mph incline 1.0% 3.5 minrounds with 5# AW     Side stepping 0.5-0.6mph close supervision -SOLO treadmill no UE support 1.2mph incline 1.0% 4.min rounds with 5# AW    Side stepping 0.5-0.6 mph close supervision, 3x30s intervals, close supervision -SOLO treadmill no UE support 1.2mph incline 1.0% 4 min round with 5# AW    -SOLO treadmill No UE support 2min 1.6mph 3x    Side stepping 0.5-0.6mph close supervision 3x30\" intervals close supervision  -SOLO treadmll no UE support 1.7mph incline 1.0% 5# AW 4 rounds    -SOLO treadmill no UE support side stepping 0.9 mph 2x1 min intervals bilaterally close supervision -Solo treadmill no UE support 1.7mph incline 2.0% 5# 2 min rounds 4x increased LBP.      Hurdles   -In SOLO 6 hurdles middle setting no AD 3lb AW -In SOLO 6 hurdles middle setting no AD 3lb AW -In SOLO 6 hurdles middle setting no AD 3lb AW -In SOLO 6 hurdles middle setting no AD 3lb AW In SOLO 6 hurdles no AD 3lb AW:   - fwd middle setting x4 laps   -lat low setting x3 laps In SOLO 6 hurdles no AD 3lb AW    -fwd middle setting x4 laps with 3lb AW  -lat low setting x3 laps  with 3 LB AW In SOLO 6 hurdles no AD 3lb AW, fwd highest setting x4 laps with 3lb AW    -side stepping middle setting x4 laps with 3lb AW In SOLO 6 hurdles no AD 3lb AW fwd middle  setting x4 laps with 3lb AW     In SOLO 6 hurdles no AD 4lb AW fwd middle setting 4x, sideways 4x, and forward on highes t setting 2x In SOLO 6 hurdles no AD 5lb AW highest setting fwd 6x, 6x sideways.  In SOLO 6 hurdles no AD 5lb AW highest setting fwd 6x, 6x sideways In SOLO 6 hurdles no AD 5lb AW highest setting fwd 6x, 6x sideways over foam beam.  In SOLO 6 hurdles no AD 5lb AW highest setting sideways over foam beam 6x close supervision   In SOLO 6 hurdles no AD 5lb AW highest setting sideways over foam beam 6x close supervision    In SOLO 6 hurdles no AD 7.5LB on L " LE 5x leading with L LE 5x leading with R LE    Uneven surface gait    Uneven mat with AW underneath 6x full length of mat.   -Walking on foam beams (2 next to each other) fwd only x4 laps 3# AW -side stepping on foam beams x10ft with 3lb AW x5 laps -side stepping on foam beams x10ft with 3lb AW x5 laps    -uneven surface mat with AW underneath 6x length of mat no AD  Side stepping on foam beams 2 beams with 4lb AW x 6 laps     Side stepping on foam beam with 2 beams 5lb AW x 6laps Side stepping on foam with 2 beams 6lb AW x6 laps         -Forward stepping on foam beam carrying bolster, 7.5lb AW bilaterally    Static balance         FT EC 3x1 min FT EC 4x1 min on foam Ft EC 4x1 min on foam  FT EC 4x1 min on foam FT EC 4x1 min on foam       FT EC 4x 1 min     Tandem gait             4x40ft with 5lb AW            Testing                         Dynamic Balance   -Standing Cone taps in SOLO 20x/LE -standing cone taps in SOLO 20x/LE     Backwards walking 3x40t          6x40ft backwards walking 10lb AW on L LE 3x, 10lb on R LE      Reactive strategy training           Tapping incline board 30x forward 30x backward.           Walkign with pulling theraband to mimic dog walking, 30x    Resisted gait          Pink t-band resistance to R LE, 4lb AW L LE, with QC. 20ft 6x to resist during stance.           15lb AW bilaterally on each LE 1 min speed rounds 4x.     Tests & measures           FGA, 6MWT, ABC, 10mWT, GARZA, 5xSTS       FGA, 6MWT, ABC, 10mWT, 5xSTS, ROS    FGA, 6MWT, ABC, 10mWT, 5xSTS, ROS, review of goals, review of progress made thus far, safety check after fall.    Patient education           Ongoing strength & balance deficits, importance of unilateral L Strengthening to challenge L LE, ongoing endurance deficits, plan moving forwards.              Step ups  1x8 leading with R LE lowering with L LE. Initial modA, improved to CGA by conclusion of session 2x10 leading with R LE lowering with L LE with SPC to  prepare for weaning off RW. 2x10 middle setting 2x10 leading with R LE lowering with L LE with SPC middle setting. 3lb AW.   - up and down foam incline  LLE leading x5 3# AW -up and down highest setting on step with QC, occasional cueing needed for sequencing 2x10 with 3lb AW -up and down highest setting on step with QC occasional cueing needed for sequencing 2x10 with 3lb AW       2x10 bilaterally +5lb AW on L LE no AD forced use 2x10 bilaterally 5lb AW on L LE no AD forced use holding 7lb AW bilaterally.          Ther Ex                         Split stance STS            3x10 biasing L LE        3x10 biasing L LE 3x10 biasing L LE holding 10lb KB on L Side    LAQ            2x10 bilaterally 10lb AW        3x10 bilaterally 15lb AW     Heel Raises                         Patient education            Review of updated HEP              Stair navigation                     W SPC 2x up and 2x down step through, practicing sequencing                                                                               Ther Activity                         Skin inspection                         Curb transfer    With SPC supervision assistance                       AD Training    With SPC around clinic and to car                     Gait Training                                                                           Modalities

## 2024-02-07 ENCOUNTER — OFFICE VISIT (OUTPATIENT)
Facility: CLINIC | Age: 54
End: 2024-02-07
Payer: COMMERCIAL

## 2024-02-07 DIAGNOSIS — R26.2 DIFFICULTY WALKING: ICD-10-CM

## 2024-02-07 DIAGNOSIS — G03.9 MENINGITIS: ICD-10-CM

## 2024-02-07 DIAGNOSIS — I63.9 CEREBROVASCULAR ACCIDENT (CVA), UNSPECIFIED MECHANISM (HCC): Primary | ICD-10-CM

## 2024-02-07 PROCEDURE — 97110 THERAPEUTIC EXERCISES: CPT

## 2024-02-07 PROCEDURE — 97112 NEUROMUSCULAR REEDUCATION: CPT

## 2024-02-07 NOTE — PROGRESS NOTES
Daily Note     Today's date: 2024  Patient name: Seth Almazan  : 1970  MRN: [id number]  Referring provider: Stephanie Hurd P*  Dx:   Encounter Diagnosis     ICD-10-CM    1. Cerebrovascular accident (CVA), unspecified mechanism (HCC)  I63.9       2. Meningitis  G03.9       3. Difficulty walking  R26.2                      Subjective: Pt states that he currently has 5-6/10 back pain. He reports no new falls since he was last here. He notes that standing continues to increase his back pain more.       Objective: See treatment diary below      Assessment: Pt participated in a skilled PT session focused on balance, gait, and strenghtening intervention. Sartorius sliders were added to challenge LE strength and improve pt's ability to put on socks and pants. Pt exhibited significant difficulty performing successfully, and required cues to breath and not use his hands to perform task. Pt was able to perform hula hoop step throughs without LOB. Stairs were performed with AW to challenge endurance and LE strength and pt reported increased foot scuffing when descending as fatigue increased. Gait training was performed on TM, however increased incline irritated pt's back today. Incline was subsequently reduced & speed was increase and pt was able to navigate with less LBP as compared to higher incline. Staggered stance STS was performed with minimal difficulty. Pt will continue to benefit from skilled PT intervention focused on balance, gait, and strengthening so that he can navigate his environment with increased safety and stability.       Plan: Continue per plan of care.      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   POC Expires:2023      Objective IE 10/3  10/23 10/27 10/31 11/2: RE 11/8 11/10 11/14 11/20 11/27 12/4: RE 12/6 12/11 12/18 12/20 1/2/24 01/4/24 1/9/24: RE 1/10 1/15 1/29 1/31 2/7   ABC 26.25%    51.88%       54.38%       68.13%    62.5%    5xSTS 20.78s no UE    15.52s       12.60s       10.55s    9.42s    6MWT 2MWT 168.3ft  w RW    W .3ft, 1 rest break at 3 min       1000ft w SPC no rest break       947ft w SPC    1,299ft    FGA           15/30           19/30    Briseno     46      50               10mWT           0.49m/s        0.70m/s    With SPC: 0.97m/s    Without AD: 0.99    Manuals                                                                                                                                  Neuro Re-Ed                          Gait training in SOLO  -No AD 8 laps 20ft with 3lb AW on L LE     -treadmill speed 1.0mph incline 1% 6 min 3lb AW on L LE -SOLO Treadmill speed 1.3mph incline 1% 3lb AW on L LE 6 min    -backwards walking 6x20ft no AD in SOLO -SOLO treadmill speed 1.3mph incline 1% 3lb AW on L LE 6 min -SOLO treadmill speed 1.3mph incline 1% 3lb AW on L LE 6 min -SOLO treadmill speed 1.3mph incline 1% 3lb AW on L LE 6 min -SOLO treadmill speed 1.3mph incline 2.0% 3lb AW on L LE 8 min -SOLO treadmill speed 1.3mph incline 2.0% 3lb AW on L LE 6 min with  -SOLO treadmill speed 1.5 mph incline 3.0% 6 min -SOLO treadmill speed 1.3mph incline 2.0% with 4# AW 8min -SOLO treadmill speed 1.5mph incline 2.5% with 5# AW 9 min -SOLO Treadmill no UE support 1.1mph incline 0%with 5# AW 6 min with frequent rest breaks required -SOLO Treadmill no UE support 1.1 mph incline  0% with 5# AW 10 min frequent rest breaks required every 3.0min -SOLO treadmill no UE support 1.1mph incline 0.5%, 3.5 min rounds w 5# AW    Side stepping 0.5-0.6mph 3x30 seconds intervals, closer supervisoin and occasional contact guard CGA.  -SOLO treadmill no UE support 1.1mph incline 1.0% 3.5 minrounds with 5# AW     Side stepping 0.5-0.6mph close supervision -SOLO  "treadmill no UE support 1.2mph incline 1.0% 4.min rounds with 5# AW    Side stepping 0.5-0.6 mph close supervision, 3x30s intervals, close supervision -SOLO treadmill no UE support 1.2mph incline 1.0% 4 min round with 5# AW    -SOLO treadmill No UE support 2min 1.6mph 3x    Side stepping 0.5-0.6mph close supervision 3x30\" intervals close supervision  -SOLO treadmll no UE support 1.7mph incline 1.0% 5# AW 4 rounds    -SOLO treadmill no UE support side stepping 0.9 mph 2x1 min intervals bilaterally close supervision -Solo treadmill no UE support 1.7mph incline 2.0% 5# 2 min rounds 4x increased LBP.    -SOLO treadmill no UE support 1.7mph incline 2.0% 7.5# 2 min intervals x2    -SOLO treadmill no UE support 1.8mph 0.5% incline 7.5# 2 min intervals x2   Hurdles   -In SOLO 6 hurdles middle setting no AD 3lb AW -In SOLO 6 hurdles middle setting no AD 3lb AW -In SOLO 6 hurdles middle setting no AD 3lb AW -In SOLO 6 hurdles middle setting no AD 3lb AW In SOLO 6 hurdles no AD 3lb AW:   - fwd middle setting x4 laps   -lat low setting x3 laps In SOLO 6 hurdles no AD 3lb AW    -fwd middle setting x4 laps with 3lb AW  -lat low setting x3 laps  with 3 LB AW In SOLO 6 hurdles no AD 3lb AW, fwd highest setting x4 laps with 3lb AW    -side stepping middle setting x4 laps with 3lb AW In SOLO 6 hurdles no AD 3lb AW fwd middle  setting x4 laps with 3lb AW     In SOLO 6 hurdles no AD 4lb AW fwd middle setting 4x, sideways 4x, and forward on highes t setting 2x In SOLO 6 hurdles no AD 5lb AW highest setting fwd 6x, 6x sideways.  In SOLO 6 hurdles no AD 5lb AW highest setting fwd 6x, 6x sideways In SOLO 6 hurdles no AD 5lb AW highest setting fwd 6x, 6x sideways over foam beam.  In SOLO 6 hurdles no AD 5lb AW highest setting sideways over foam beam 6x close supervision   In SOLO 6 hurdles no AD 5lb AW highest setting sideways over foam beam 6x close supervision    In SOLO 6 hurdles no AD 7.5LB on L LE 5x leading with L LE 5x leading with R " LE     Uneven surface gait    Uneven mat with AW underneath 6x full length of mat.   -Walking on foam beams (2 next to each other) fwd only x4 laps 3# AW -side stepping on foam beams x10ft with 3lb AW x5 laps -side stepping on foam beams x10ft with 3lb AW x5 laps    -uneven surface mat with AW underneath 6x length of mat no AD  Side stepping on foam beams 2 beams with 4lb AW x 6 laps     Side stepping on foam beam with 2 beams 5lb AW x 6laps Side stepping on foam with 2 beams 6lb AW x6 laps         -Forward stepping on foam beam carrying bolster, 7.5lb AW bilaterally  -Forward stepping on foam beam carrying bolster 7.5lb AW bilaterally     Static balance         FT EC 3x1 min FT EC 4x1 min on foam Ft EC 4x1 min on foam  FT EC 4x1 min on foam FT EC 4x1 min on foam       FT EC 4x 1 min      Tandem gait             4x40ft with 5lb AW             Testing                          Dynamic Balance   -Standing Cone taps in SOLO 20x/LE -standing cone taps in SOLO 20x/LE     Backwards walking 3x40t          6x40ft backwards walking 10lb AW on L LE 3x, 10lb on R LE       Reactive strategy training           Tapping incline board 30x forward 30x backward.           Walkign with pulling theraband to mimic dog walking, 30x  Walking with pulling t-band to mimic dog walking while carrying green bolster in L UE 8x20ft   Resisted gait          Pink t-band resistance to R LE, 4lb AW L LE, with QC. 20ft 6x to resist during stance.           15lb AW bilaterally on each LE 1 min speed rounds 4x.      Tests & measures           FGA, 6MWT, ABC, 10mWT, GARZA, 5xSTS       FGA, 6MWT, ABC, 10mWT, 5xSTS, ROS    FGA, 6MWT, ABC, 10mWT, 5xSTS, ROS, review of goals, review of progress made thus far, safety check after fall.     Patient education           Ongoing strength & balance deficits, importance of unilateral L Strengthening to challenge L LE, ongoing endurance deficits, plan moving forwards.               Step ups  1x8 leading with R LE  lowering with L LE. Initial modA, improved to CGA by conclusion of session 2x10 leading with R LE lowering with L LE with SPC to prepare for weaning off RW. 2x10 middle setting 2x10 leading with R LE lowering with L LE with SPC middle setting. 3lb AW.   - up and down foam incline  LLE leading x5 3# AW -up and down highest setting on step with QC, occasional cueing needed for sequencing 2x10 with 3lb AW -up and down highest setting on step with QC occasional cueing needed for sequencing 2x10 with 3lb AW       2x10 bilaterally +5lb AW on L LE no AD forced use 2x10 bilaterally 5lb AW on L LE no AD forced use holding 7lb AW bilaterally.           Ther Ex                          Split stance STS            3x10 biasing L LE        3x10 biasing L LE 3x10 biasing L LE holding 10lb KB on L Side  3x10 biasing L LE + 10lb KB   LAQ            2x10 bilaterally 10lb AW        3x10 bilaterally 15lb AW      Heel Raises                          Patient education            Review of updated HEP               Sartorius sliders                       3x10 bilaterally                                                       Ther Activity                          Skin inspection                          Curb transfer    With SPC supervision assistance                        Stair navigation                       4x up and down 12 steps 3# AW on L LE   Hula Hoop walk throughs                       10x with large hula hoop 10x with small hula hoop no errors noted.    AD Training    With SPC around clinic and to car                      Gait Training                                                                              Modalities

## 2024-02-12 ENCOUNTER — OFFICE VISIT (OUTPATIENT)
Facility: CLINIC | Age: 54
End: 2024-02-12
Payer: COMMERCIAL

## 2024-02-12 DIAGNOSIS — I63.9 CEREBROVASCULAR ACCIDENT (CVA), UNSPECIFIED MECHANISM (HCC): Primary | ICD-10-CM

## 2024-02-12 DIAGNOSIS — R26.2 DIFFICULTY WALKING: ICD-10-CM

## 2024-02-12 DIAGNOSIS — G03.9 MENINGITIS: ICD-10-CM

## 2024-02-12 PROCEDURE — 97110 THERAPEUTIC EXERCISES: CPT

## 2024-02-12 PROCEDURE — 97112 NEUROMUSCULAR REEDUCATION: CPT

## 2024-02-12 NOTE — PROGRESS NOTES
Daily Note     Today's date: 2024  Patient name: Seth Almazan  : 1970  MRN: [id number]  Referring provider: Stephanie Hurd P*  Dx:   Encounter Diagnosis     ICD-10-CM    1. Cerebrovascular accident (CVA), unspecified mechanism (HCC)  I63.9       2. Meningitis  G03.9       3. Difficulty walking  R26.2                        Subjective: Pt states that his legs and back are currently really bothering him today.        Objective: See treatment diary below      Assessment: Pt participated in a skilled PT session focused on balance, gait, and strenghtening intervention. Due to increased LBP, treadmill gait was held for today. Pt exhibited reduced effort needed to perform sartorius sliders today as compared to previous visit. Stair navigation was performed with 5lb AW and pt exhibited rapid rate of fatigue with performance as well as increased difficulty achieving foot clearance. Incline wobble board was implemented with EC to challenge proprioception and ankle strategy, pt exhibited significant difficulty with performance of task. Pt will continue to benefit from skilled PT intervention focused on balance, gait, and strengthening so that he can navigate his environment with increased safety and stability.       Plan: Continue per plan of care.      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   POC Expires:2023      Objective IE 10/3 10/23 10/27 10/31 11/2: RE 11/8 11/10 11/14 11/20 11/27 12/4: RE 24: RE 1/10 1/15 1/29 1/31 2/7 2/12   ABC 26.25%    51.88%       54.38%       68.13%    62.5%     5xSTS 20.78s no UE    15.52s       12.60s       10.55s    9.42s     6MWT 2MWT 168.3ft  w RW    W .3ft, 1 rest break at 3  min       1000ft w SPC no rest break       947ft w SPC    1,299ft     FGA           15/30           19/30     Briseno     46      50                10mWT           0.49m/s        0.70m/s    With SPC: 0.97m/s    Without AD: 0.99     Manuals                                                                                                                                       Neuro Re-Ed                           Gait training in SOLO  -No AD 8 laps 20ft with 3lb AW on L LE     -treadmill speed 1.0mph incline 1% 6 min 3lb AW on L LE -SOLO Treadmill speed 1.3mph incline 1% 3lb AW on L LE 6 min    -backwards walking 6x20ft no AD in SOLO -SOLO treadmill speed 1.3mph incline 1% 3lb AW on L LE 6 min -SOLO treadmill speed 1.3mph incline 1% 3lb AW on L LE 6 min -SOLO treadmill speed 1.3mph incline 1% 3lb AW on L LE 6 min -SOLO treadmill speed 1.3mph incline 2.0% 3lb AW on L LE 8 min -SOLO treadmill speed 1.3mph incline 2.0% 3lb AW on L LE 6 min with  -SOLO treadmill speed 1.5 mph incline 3.0% 6 min -SOLO treadmill speed 1.3mph incline 2.0% with 4# AW 8min -SOLO treadmill speed 1.5mph incline 2.5% with 5# AW 9 min -SOLO Treadmill no UE support 1.1mph incline 0%with 5# AW 6 min with frequent rest breaks required -SOLO Treadmill no UE support 1.1 mph incline  0% with 5# AW 10 min frequent rest breaks required every 3.0min -SOLO treadmill no UE support 1.1mph incline 0.5%, 3.5 min rounds w 5# AW    Side stepping 0.5-0.6mph 3x30 seconds intervals, closer supervisoin and occasional contact guard CGA.  -SOLO treadmill no UE support 1.1mph incline 1.0% 3.5 minrounds with 5# AW     Side stepping 0.5-0.6mph close supervision -SOLO treadmill no UE support 1.2mph incline 1.0% 4.min rounds with 5# AW    Side stepping 0.5-0.6 mph close supervision, 3x30s intervals, close supervision -SOLO treadmill no UE support 1.2mph incline 1.0% 4 min round with 5# AW    -SOLO treadmill No UE support 2min 1.6mph 3x    Side stepping 0.5-0.6mph close  "supervision 3x30\" intervals close supervision  -SOLO treadmll no UE support 1.7mph incline 1.0% 5# AW 4 rounds    -SOLO treadmill no UE support side stepping 0.9 mph 2x1 min intervals bilaterally close supervision -Solo treadmill no UE support 1.7mph incline 2.0% 5# 2 min rounds 4x increased LBP.    -SOLO treadmill no UE support 1.7mph incline 2.0% 7.5# 2 min intervals x2    -SOLO treadmill no UE support 1.8mph 0.5% incline 7.5# 2 min intervals x2    Hurdles   -In SOLO 6 hurdles middle setting no AD 3lb AW -In SOLO 6 hurdles middle setting no AD 3lb AW -In SOLO 6 hurdles middle setting no AD 3lb AW -In SOLO 6 hurdles middle setting no AD 3lb AW In SOLO 6 hurdles no AD 3lb AW:   - fwd middle setting x4 laps   -lat low setting x3 laps In SOLO 6 hurdles no AD 3lb AW    -fwd middle setting x4 laps with 3lb AW  -lat low setting x3 laps  with 3 LB AW In SOLO 6 hurdles no AD 3lb AW, fwd highest setting x4 laps with 3lb AW    -side stepping middle setting x4 laps with 3lb AW In SOLO 6 hurdles no AD 3lb AW fwd middle  setting x4 laps with 3lb AW     In SOLO 6 hurdles no AD 4lb AW fwd middle setting 4x, sideways 4x, and forward on highes t setting 2x In SOLO 6 hurdles no AD 5lb AW highest setting fwd 6x, 6x sideways.  In SOLO 6 hurdles no AD 5lb AW highest setting fwd 6x, 6x sideways In SOLO 6 hurdles no AD 5lb AW highest setting fwd 6x, 6x sideways over foam beam.  In SOLO 6 hurdles no AD 5lb AW highest setting sideways over foam beam 6x close supervision   In SOLO 6 hurdles no AD 5lb AW highest setting sideways over foam beam 6x close supervision    In SOLO 6 hurdles no AD 7.5LB on L LE 5x leading with L LE 5x leading with R LE      Ankle strategy                        Forward and backward EC on foam board holding bolster in both hands. 20x, intermittent modA, SOLO   Uneven surface gait    Uneven mat with AW underneath 6x full length of mat.   -Walking on foam beams (2 next to each other) fwd only x4 laps 3# AW -side " stepping on foam beams x10ft with 3lb AW x5 laps -side stepping on foam beams x10ft with 3lb AW x5 laps    -uneven surface mat with AW underneath 6x length of mat no AD  Side stepping on foam beams 2 beams with 4lb AW x 6 laps     Side stepping on foam beam with 2 beams 5lb AW x 6laps Side stepping on foam with 2 beams 6lb AW x6 laps         -Forward stepping on foam beam carrying bolster, 7.5lb AW bilaterally  -Forward stepping on foam beam carrying bolster 7.5lb AW bilaterally   -Forward stepping on foam beam carrying bolster + 3lb AW; 7.5lb AW bilaterally 8x in SOLO   Static balance         FT EC 3x1 min FT EC 4x1 min on foam Ft EC 4x1 min on foam  FT EC 4x1 min on foam FT EC 4x1 min on foam       FT EC 4x 1 min       Tandem gait             4x40ft with 5lb AW              Testing                           Dynamic Balance   -Standing Cone taps in SOLO 20x/LE -standing cone taps in SOLO 20x/LE     Backwards walking 3x40t          6x40ft backwards walking 10lb AW on L LE 3x, 10lb on R LE        Reactive strategy training           Tapping incline board 30x forward 30x backward.           Walkign with pulling theraband to mimic dog walking, 30x  Walking with pulling t-band to mimic dog walking while carrying green bolster in L UE 8x20ft Walking with pulling t-band to mimic dog walking while carrying green bolster in L UE + 3lb AW 8x 20ft   Resisted gait          Pink t-band resistance to R LE, 4lb AW L LE, with QC. 20ft 6x to resist during stance.           15lb AW bilaterally on each LE 1 min speed rounds 4x.       Tests & measures           FGA, 6MWT, ABC, 10mWT, GARZA, 5xSTS       FGA, 6MWT, ABC, 10mWT, 5xSTS, ROS    FGA, 6MWT, ABC, 10mWT, 5xSTS, ROS, review of goals, review of progress made thus far, safety check after fall.      Patient education           Ongoing strength & balance deficits, importance of unilateral L Strengthening to challenge L LE, ongoing endurance deficits, plan moving forwards.                 Step ups  1x8 leading with R LE lowering with L LE. Initial modA, improved to CGA by conclusion of session 2x10 leading with R LE lowering with L LE with SPC to prepare for weaning off RW. 2x10 middle setting 2x10 leading with R LE lowering with L LE with SPC middle setting. 3lb AW.   - up and down foam incline  LLE leading x5 3# AW -up and down highest setting on step with QC, occasional cueing needed for sequencing 2x10 with 3lb AW -up and down highest setting on step with QC occasional cueing needed for sequencing 2x10 with 3lb AW       2x10 bilaterally +5lb AW on L LE no AD forced use 2x10 bilaterally 5lb AW on L LE no AD forced use holding 7lb AW bilaterally.            Ther Ex                           Split stance STS            3x10 biasing L LE        3x10 biasing L LE 3x10 biasing L LE holding 10lb KB on L Side  3x10 biasing L LE + 10lb KB 3x10 biasing L LE + 10Lb KB   LAQ            2x10 bilaterally 10lb AW        3x10 bilaterally 15lb AW       Heel Raises                           Patient education            Review of updated HEP                Sartorius sliders                       3x10 bilaterally 3x10 bilaterally                                                         Ther Activity                           Skin inspection                           Curb transfer    With SPC supervision assistance                         Stair navigation                       4x up and down 12 steps 3# AW on L LE 4x up and down 12 steps 5# AW on L LE   Hula Hoop walk throughs                       10x with large hula hoop 10x with small hula hoop no errors noted.     AD Training    With SPC around clinic and to car                       Gait Training                                                                                 Modalities

## 2024-02-14 ENCOUNTER — APPOINTMENT (OUTPATIENT)
Facility: CLINIC | Age: 54
End: 2024-02-14
Payer: COMMERCIAL

## 2024-02-14 NOTE — PROGRESS NOTES
Daily Note     Today's date: 2024  Patient name: Seth Almazan  : 1970  MRN: [id number]  Referring provider: Stephanie Hurd P*  Dx:   No diagnosis found.                   Subjective: Pt states that his legs and back are currently really bothering him today.        Objective: See treatment diary below      Assessment: Pt participated in a skilled PT session focused on balance, gait, and strenghtening intervention. Due to increased LBP, treadmill gait was held for today. Pt exhibited reduced effort needed to perform sartorius sliders today as compared to previous visit. Stair navigation was performed with 5lb AW and pt exhibited rapid rate of fatigue with performance as well as increased difficulty achieving foot clearance. Incline wobble board was implemented with EC to challenge proprioception and ankle strategy, pt exhibited significant difficulty with performance of task. Pt will continue to benefit from skilled PT intervention focused on balance, gait, and strengthening so that he can navigate his environment with increased safety and stability.       Plan: Continue per plan of care.      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   POC Expires:2023      Objective IE 10/3 10/23 10/27 10/31 11/2: RE 11/8 11/10 11/14 11/20 11/27 12/4: RE 24: RE 1/10 1/15 1/29 1/31 2/7 2/12 2/14   ABC 26.25%    51.88%       54.38%       68.13%    62.5%      5xSTS 20.78s no UE    15.52s       12.60s       10.55s    9.42s      6MWT 2MWT 168.3ft  w RW    W .3ft, 1 rest break at 3 min       1000ft w SPC no rest break       947ft w SPC    1,299ft      FGA           15/30           19/30      Briseno     46      50                "  10mWT           0.49m/s        0.70m/s    With SPC: 0.97m/s    Without AD: 0.99      Manuals                                                                                                                                            Neuro Re-Ed                            Gait training in SOLO  -No AD 8 laps 20ft with 3lb AW on L LE     -treadmill speed 1.0mph incline 1% 6 min 3lb AW on L LE -SOLO Treadmill speed 1.3mph incline 1% 3lb AW on L LE 6 min    -backwards walking 6x20ft no AD in SOLO -SOLO treadmill speed 1.3mph incline 1% 3lb AW on L LE 6 min -SOLO treadmill speed 1.3mph incline 1% 3lb AW on L LE 6 min -SOLO treadmill speed 1.3mph incline 1% 3lb AW on L LE 6 min -SOLO treadmill speed 1.3mph incline 2.0% 3lb AW on L LE 8 min -SOLO treadmill speed 1.3mph incline 2.0% 3lb AW on L LE 6 min with  -SOLO treadmill speed 1.5 mph incline 3.0% 6 min -SOLO treadmill speed 1.3mph incline 2.0% with 4# AW 8min -SOLO treadmill speed 1.5mph incline 2.5% with 5# AW 9 min -SOLO Treadmill no UE support 1.1mph incline 0%with 5# AW 6 min with frequent rest breaks required -SOLO Treadmill no UE support 1.1 mph incline  0% with 5# AW 10 min frequent rest breaks required every 3.0min -SOLO treadmill no UE support 1.1mph incline 0.5%, 3.5 min rounds w 5# AW    Side stepping 0.5-0.6mph 3x30 seconds intervals, closer supervisoin and occasional contact guard CGA.  -SOLO treadmill no UE support 1.1mph incline 1.0% 3.5 minrounds with 5# AW     Side stepping 0.5-0.6mph close supervision -SOLO treadmill no UE support 1.2mph incline 1.0% 4.min rounds with 5# AW    Side stepping 0.5-0.6 mph close supervision, 3x30s intervals, close supervision -SOLO treadmill no UE support 1.2mph incline 1.0% 4 min round with 5# AW    -SOLO treadmill No UE support 2min 1.6mph 3x    Side stepping 0.5-0.6mph close supervision 3x30\" intervals close supervision  -SOLO treadmll no UE support 1.7mph incline 1.0% 5# AW 4 rounds    -SOLO treadmill no UE support " side stepping 0.9 mph 2x1 min intervals bilaterally close supervision -Solo treadmill no UE support 1.7mph incline 2.0% 5# 2 min rounds 4x increased LBP.    -SOLO treadmill no UE support 1.7mph incline 2.0% 7.5# 2 min intervals x2    -SOLO treadmill no UE support 1.8mph 0.5% incline 7.5# 2 min intervals x2     Hurdles   -In SOLO 6 hurdles middle setting no AD 3lb AW -In SOLO 6 hurdles middle setting no AD 3lb AW -In SOLO 6 hurdles middle setting no AD 3lb AW -In SOLO 6 hurdles middle setting no AD 3lb AW In SOLO 6 hurdles no AD 3lb AW:   - fwd middle setting x4 laps   -lat low setting x3 laps In SOLO 6 hurdles no AD 3lb AW    -fwd middle setting x4 laps with 3lb AW  -lat low setting x3 laps  with 3 LB AW In SOLO 6 hurdles no AD 3lb AW, fwd highest setting x4 laps with 3lb AW    -side stepping middle setting x4 laps with 3lb AW In SOLO 6 hurdles no AD 3lb AW fwd middle  setting x4 laps with 3lb AW     In SOLO 6 hurdles no AD 4lb AW fwd middle setting 4x, sideways 4x, and forward on highes t setting 2x In SOLO 6 hurdles no AD 5lb AW highest setting fwd 6x, 6x sideways.  In SOLO 6 hurdles no AD 5lb AW highest setting fwd 6x, 6x sideways In SOLO 6 hurdles no AD 5lb AW highest setting fwd 6x, 6x sideways over foam beam.  In SOLO 6 hurdles no AD 5lb AW highest setting sideways over foam beam 6x close supervision   In SOLO 6 hurdles no AD 5lb AW highest setting sideways over foam beam 6x close supervision    In SOLO 6 hurdles no AD 7.5LB on L LE 5x leading with L LE 5x leading with R LE       Ankle strategy                        Forward and backward EC on foam board holding bolster in both hands. 20x, intermittent modA, SOLO    Uneven surface gait    Uneven mat with AW underneath 6x full length of mat.   -Walking on foam beams (2 next to each other) fwd only x4 laps 3# AW -side stepping on foam beams x10ft with 3lb AW x5 laps -side stepping on foam beams x10ft with 3lb AW x5 laps    -uneven surface mat with AW underneath  6x length of mat no AD  Side stepping on foam beams 2 beams with 4lb AW x 6 laps     Side stepping on foam beam with 2 beams 5lb AW x 6laps Side stepping on foam with 2 beams 6lb AW x6 laps         -Forward stepping on foam beam carrying bolster, 7.5lb AW bilaterally  -Forward stepping on foam beam carrying bolster 7.5lb AW bilaterally   -Forward stepping on foam beam carrying bolster + 3lb AW; 7.5lb AW bilaterally 8x in SOLO    Static balance         FT EC 3x1 min FT EC 4x1 min on foam Ft EC 4x1 min on foam  FT EC 4x1 min on foam FT EC 4x1 min on foam       FT EC 4x 1 min        Tandem gait             4x40ft with 5lb AW               Testing                            Dynamic Balance   -Standing Cone taps in SOLO 20x/LE -standing cone taps in SOLO 20x/LE     Backwards walking 3x40t          6x40ft backwards walking 10lb AW on L LE 3x, 10lb on R LE         Reactive strategy training           Tapping incline board 30x forward 30x backward.           Walkign with pulling theraband to mimic dog walking, 30x  Walking with pulling t-band to mimic dog walking while carrying green bolster in L UE 8x20ft Walking with pulling t-band to mimic dog walking while carrying green bolster in L UE + 3lb AW 8x 20ft    Resisted gait          Pink t-band resistance to R LE, 4lb AW L LE, with QC. 20ft 6x to resist during stance.           15lb AW bilaterally on each LE 1 min speed rounds 4x.        Tests & measures           FGA, 6MWT, ABC, 10mWT, GARZA, 5xSTS       FGA, 6MWT, ABC, 10mWT, 5xSTS, ROS    FGA, 6MWT, ABC, 10mWT, 5xSTS, ROS, review of goals, review of progress made thus far, safety check after fall.       Patient education           Ongoing strength & balance deficits, importance of unilateral L Strengthening to challenge L LE, ongoing endurance deficits, plan moving forwards.                 Step ups  1x8 leading with R LE lowering with L LE. Initial modA, improved to CGA by conclusion of session 2x10 leading with R LE  lowering with L LE with SPC to prepare for weaning off RW. 2x10 middle setting 2x10 leading with R LE lowering with L LE with SPC middle setting. 3lb AW.   - up and down foam incline  LLE leading x5 3# AW -up and down highest setting on step with QC, occasional cueing needed for sequencing 2x10 with 3lb AW -up and down highest setting on step with QC occasional cueing needed for sequencing 2x10 with 3lb AW       2x10 bilaterally +5lb AW on L LE no AD forced use 2x10 bilaterally 5lb AW on L LE no AD forced use holding 7lb AW bilaterally.             Ther Ex                            Split stance STS            3x10 biasing L LE        3x10 biasing L LE 3x10 biasing L LE holding 10lb KB on L Side  3x10 biasing L LE + 10lb KB 3x10 biasing L LE + 10Lb KB    LAQ            2x10 bilaterally 10lb AW        3x10 bilaterally 15lb AW        Heel Raises                            Patient education            Review of updated HEP                 Sartorius sliders                       3x10 bilaterally 3x10 bilaterally                                                            Ther Activity                            Skin inspection                            Curb transfer    With SPC supervision assistance                          Stair navigation                       4x up and down 12 steps 3# AW on L LE 4x up and down 12 steps 5# AW on L LE    Hula Hoop walk throughs                       10x with large hula hoop 10x with small hula hoop no errors noted.      AD Training    With SPC around clinic and to car                        Gait Training                                                                                    Modalities

## 2024-02-19 ENCOUNTER — APPOINTMENT (OUTPATIENT)
Facility: CLINIC | Age: 54
End: 2024-02-19
Payer: COMMERCIAL

## 2024-02-20 DIAGNOSIS — F43.23 SITUATIONAL MIXED ANXIETY AND DEPRESSIVE DISORDER: ICD-10-CM

## 2024-02-20 RX ORDER — HYDROXYZINE HYDROCHLORIDE 25 MG/1
TABLET, FILM COATED ORAL
Qty: 30 TABLET | Refills: 0 | Status: SHIPPED | OUTPATIENT
Start: 2024-02-20

## 2024-02-21 ENCOUNTER — OFFICE VISIT (OUTPATIENT)
Facility: CLINIC | Age: 54
End: 2024-02-21
Payer: COMMERCIAL

## 2024-02-21 DIAGNOSIS — G03.9 MENINGITIS: ICD-10-CM

## 2024-02-21 DIAGNOSIS — I63.9 CEREBROVASCULAR ACCIDENT (CVA), UNSPECIFIED MECHANISM (HCC): Primary | ICD-10-CM

## 2024-02-21 DIAGNOSIS — R26.2 DIFFICULTY WALKING: ICD-10-CM

## 2024-02-21 PROBLEM — Z12.11 SCREENING FOR COLORECTAL CANCER: Status: RESOLVED | Noted: 2020-09-18 | Resolved: 2024-02-21

## 2024-02-21 PROBLEM — Z12.12 SCREENING FOR COLORECTAL CANCER: Status: RESOLVED | Noted: 2020-09-18 | Resolved: 2024-02-21

## 2024-02-21 PROCEDURE — 97112 NEUROMUSCULAR REEDUCATION: CPT

## 2024-02-21 NOTE — PROGRESS NOTES
Daily Note     Today's date: 2024  Patient name: Seth Almazan  : 1970  MRN: [id number]  Referring provider: Stephanie Hurd P*  Dx:   Encounter Diagnosis     ICD-10-CM    1. Cerebrovascular accident (CVA), unspecified mechanism (HCC)  I63.9       2. Meningitis  G03.9       3. Difficulty walking  R26.2                          Subjective: Pt states that he was out sick the last few days because of a respiratory illness. He tested negative for Covid and for the flu.         Objective: See treatment diary below      Assessment: Pt participated in a skilled PT session focused on balance, gait, and strenghtening intervention. CV intervention on treadmill and stairs held today due to ongoing respiratory symptoms and increased SOB compared to normal. Resistance for LE was progressed for foam beam navigation. No LOB noted with side stepping. Intermittent LOB noted with forward bema navigation. Improved form and ROM with sartorius sliders today. [place] treatment time today due to scheduling. Pt will continue to benefit from skilled PT intervention focused on balance, gait, and strengthening so that he can navigate his environment with increased safety and stability.       Plan: Continue per plan of care.      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   POC Expires:2023      Objective IE 10/3 10/23 10/27 10/31 11/2: RE 11/8 11/10 11/14 11/20 11/27 12/4: RE 24: RE 1/10 1/15 1/29 1/31 2/7 2/12 2/21   ABC 26.25%    51.88%       54.38%       68.13%    62.5%      5xSTS 20.78s no UE    15.52s       12.60s       10.55s    9.42s      6MWT 2MWT 168.3ft  w RW    W .3ft, 1 rest break at 3 min       1000ft w SPC no rest break  "      947ft w SPC    1,299ft      FGA           15/30           19/30      Briseno     46      50                 10mWT           0.49m/s        0.70m/s    With SPC: 0.97m/s    Without AD: 0.99      Manuals                                                                                                                                            Neuro Re-Ed                            Gait training in SOLO  -No AD 8 laps 20ft with 3lb AW on L LE     -treadmill speed 1.0mph incline 1% 6 min 3lb AW on L LE -SOLO Treadmill speed 1.3mph incline 1% 3lb AW on L LE 6 min    -backwards walking 6x20ft no AD in SOLO -SOLO treadmill speed 1.3mph incline 1% 3lb AW on L LE 6 min -SOLO treadmill speed 1.3mph incline 1% 3lb AW on L LE 6 min -SOLO treadmill speed 1.3mph incline 1% 3lb AW on L LE 6 min -SOLO treadmill speed 1.3mph incline 2.0% 3lb AW on L LE 8 min -SOLO treadmill speed 1.3mph incline 2.0% 3lb AW on L LE 6 min with  -SOLO treadmill speed 1.5 mph incline 3.0% 6 min -SOLO treadmill speed 1.3mph incline 2.0% with 4# AW 8min -SOLO treadmill speed 1.5mph incline 2.5% with 5# AW 9 min -SOLO Treadmill no UE support 1.1mph incline 0%with 5# AW 6 min with frequent rest breaks required -SOLO Treadmill no UE support 1.1 mph incline  0% with 5# AW 10 min frequent rest breaks required every 3.0min -SOLO treadmill no UE support 1.1mph incline 0.5%, 3.5 min rounds w 5# AW    Side stepping 0.5-0.6mph 3x30 seconds intervals, closer supervisoin and occasional contact guard CGA.  -SOLO treadmill no UE support 1.1mph incline 1.0% 3.5 minrounds with 5# AW     Side stepping 0.5-0.6mph close supervision -SOLO treadmill no UE support 1.2mph incline 1.0% 4.min rounds with 5# AW    Side stepping 0.5-0.6 mph close supervision, 3x30s intervals, close supervision -SOLO treadmill no UE support 1.2mph incline 1.0% 4 min round with 5# AW    -SOLO treadmill No UE support 2min 1.6mph 3x    Side stepping 0.5-0.6mph close supervision 3x30\" intervals close " supervision  -SOLO treadmll no UE support 1.7mph incline 1.0% 5# AW 4 rounds    -SOLO treadmill no UE support side stepping 0.9 mph 2x1 min intervals bilaterally close supervision -Solo treadmill no UE support 1.7mph incline 2.0% 5# 2 min rounds 4x increased LBP.    -SOLO treadmill no UE support 1.7mph incline 2.0% 7.5# 2 min intervals x2    -SOLO treadmill no UE support 1.8mph 0.5% incline 7.5# 2 min intervals x2     Hurdles   -In SOLO 6 hurdles middle setting no AD 3lb AW -In SOLO 6 hurdles middle setting no AD 3lb AW -In SOLO 6 hurdles middle setting no AD 3lb AW -In SOLO 6 hurdles middle setting no AD 3lb AW In SOLO 6 hurdles no AD 3lb AW:   - fwd middle setting x4 laps   -lat low setting x3 laps In SOLO 6 hurdles no AD 3lb AW    -fwd middle setting x4 laps with 3lb AW  -lat low setting x3 laps  with 3 LB AW In SOLO 6 hurdles no AD 3lb AW, fwd highest setting x4 laps with 3lb AW    -side stepping middle setting x4 laps with 3lb AW In SOLO 6 hurdles no AD 3lb AW fwd middle  setting x4 laps with 3lb AW     In SOLO 6 hurdles no AD 4lb AW fwd middle setting 4x, sideways 4x, and forward on highes t setting 2x In SOLO 6 hurdles no AD 5lb AW highest setting fwd 6x, 6x sideways.  In SOLO 6 hurdles no AD 5lb AW highest setting fwd 6x, 6x sideways In SOLO 6 hurdles no AD 5lb AW highest setting fwd 6x, 6x sideways over foam beam.  In SOLO 6 hurdles no AD 5lb AW highest setting sideways over foam beam 6x close supervision   In SOLO 6 hurdles no AD 5lb AW highest setting sideways over foam beam 6x close supervision    In SOLO 6 hurdles no AD 7.5LB on L LE 5x leading with L LE 5x leading with R LE    In SOLO 6 hurdles no AD 10# AW on L LE holding bolster+3lb AW side stepping over, cues for control, 6x   Ankle strategy                        Forward and backward EC on foam board holding bolster in both hands. 20x, intermittent modA, SOLO    Uneven surface gait    Uneven mat with AW underneath 6x full length of mat.   -Walking  on foam beams (2 next to each other) fwd only x4 laps 3# AW -side stepping on foam beams x10ft with 3lb AW x5 laps -side stepping on foam beams x10ft with 3lb AW x5 laps    -uneven surface mat with AW underneath 6x length of mat no AD  Side stepping on foam beams 2 beams with 4lb AW x 6 laps     Side stepping on foam beam with 2 beams 5lb AW x 6laps Side stepping on foam with 2 beams 6lb AW x6 laps         -Forward stepping on foam beam carrying bolster, 7.5lb AW bilaterally  -Forward stepping on foam beam carrying bolster 7.5lb AW bilaterally   -Forward stepping on foam beam carrying bolster + 3lb AW; 7.5lb AW bilaterally 8x in SOLO Forward stepping on foam beam carrying bolster 3lb added on bolster; 7.5lb AW vickey 8x in SOLO   Static balance         FT EC 3x1 min FT EC 4x1 min on foam Ft EC 4x1 min on foam  FT EC 4x1 min on foam FT EC 4x1 min on foam       FT EC 4x 1 min        Tandem gait             4x40ft with 5lb AW               Testing                            Dynamic Balance   -Standing Cone taps in SOLO 20x/LE -standing cone taps in SOLO 20x/LE     Backwards walking 3x40t          6x40ft backwards walking 10lb AW on L LE 3x, 10lb on R LE         Reactive strategy training           Tapping incline board 30x forward 30x backward.           Walkign with pulling theraband to mimic dog walking, 30x  Walking with pulling t-band to mimic dog walking while carrying green bolster in L UE 8x20ft Walking with pulling t-band to mimic dog walking while carrying green bolster in L UE + 3lb AW 8x 20ft Walking with pulling t-band to mimic dog walking while carrying green bolster in L UE + 3lb AW 8x 20ft   Resisted gait          Pink t-band resistance to R LE, 4lb AW L LE, with QC. 20ft 6x to resist during stance.           15lb AW bilaterally on each LE 1 min speed rounds 4x.        Tests & measures           FGA, 6MWT, ABC, 10mWT, GARZA, 5xSTS       FGA, 6MWT, ABC, 10mWT, 5xSTS, ROS    FGA, 6MWT, ABC, 10mWT, 5xSTS, ROS,  review of goals, review of progress made thus far, safety check after fall.       Patient education           Ongoing strength & balance deficits, importance of unilateral L Strengthening to challenge L LE, ongoing endurance deficits, plan moving forwards.                 Step ups  1x8 leading with R LE lowering with L LE. Initial modA, improved to CGA by conclusion of session 2x10 leading with R LE lowering with L LE with SPC to prepare for weaning off RW. 2x10 middle setting 2x10 leading with R LE lowering with L LE with SPC middle setting. 3lb AW.   - up and down foam incline  LLE leading x5 3# AW -up and down highest setting on step with QC, occasional cueing needed for sequencing 2x10 with 3lb AW -up and down highest setting on step with QC occasional cueing needed for sequencing 2x10 with 3lb AW       2x10 bilaterally +5lb AW on L LE no AD forced use 2x10 bilaterally 5lb AW on L LE no AD forced use holding 7lb AW bilaterally.             Ther Ex                            Split stance STS            3x10 biasing L LE        3x10 biasing L LE 3x10 biasing L LE holding 10lb KB on L Side  3x10 biasing L LE + 10lb KB 3x10 biasing L LE + 10Lb KB    LAQ            2x10 bilaterally 10lb AW        3x10 bilaterally 15lb AW        Heel Raises                            Patient education            Review of updated HEP                 Sartorius sliders                       3x10 bilaterally 3x10 bilaterally 3x10 bilaterally                                                           Ther Activity                            Skin inspection                            Curb transfer    With SPC supervision assistance                          Stair navigation                       4x up and down 12 steps 3# AW on L LE 4x up and down 12 steps 5# AW on L LE 4x up and down 12 steps 5# AW on L LE   Hula Hoop walk throughs                       10x with large hula hoop 10x with small hula hoop no errors noted.      AD Training     With SPC around clinic and to car                        Gait Training                                                                                    Modalities

## 2024-02-26 ENCOUNTER — EVALUATION (OUTPATIENT)
Facility: CLINIC | Age: 54
End: 2024-02-26
Payer: COMMERCIAL

## 2024-02-26 DIAGNOSIS — I63.9 CEREBROVASCULAR ACCIDENT (CVA), UNSPECIFIED MECHANISM (HCC): Primary | ICD-10-CM

## 2024-02-26 DIAGNOSIS — R26.2 DIFFICULTY WALKING: ICD-10-CM

## 2024-02-26 DIAGNOSIS — G03.9 MENINGITIS: ICD-10-CM

## 2024-02-26 PROCEDURE — 97112 NEUROMUSCULAR REEDUCATION: CPT

## 2024-02-28 ENCOUNTER — APPOINTMENT (OUTPATIENT)
Facility: CLINIC | Age: 54
End: 2024-02-28
Payer: COMMERCIAL

## 2024-03-04 ENCOUNTER — OFFICE VISIT (OUTPATIENT)
Facility: CLINIC | Age: 54
End: 2024-03-04
Payer: COMMERCIAL

## 2024-03-04 DIAGNOSIS — R26.2 DIFFICULTY WALKING: ICD-10-CM

## 2024-03-04 DIAGNOSIS — G03.9 MENINGITIS: ICD-10-CM

## 2024-03-04 DIAGNOSIS — I63.9 CEREBROVASCULAR ACCIDENT (CVA), UNSPECIFIED MECHANISM (HCC): Primary | ICD-10-CM

## 2024-03-04 PROCEDURE — 97112 NEUROMUSCULAR REEDUCATION: CPT | Performed by: PHYSICAL THERAPY ASSISTANT

## 2024-03-04 NOTE — PROGRESS NOTES
"Daily Note     Today's date: 3/4/2024  Patient name: Seth Almazan  : 1970  MRN: [id number]  Referring provider: Stephanie Hurd P*  Dx:   Encounter Diagnosis     ICD-10-CM    1. Cerebrovascular accident (CVA), unspecified mechanism (HCC)  I63.9       2. Meningitis  G03.9       3. Difficulty walking  R26.2                      Subjective: No new complaints.       Objective: See treatment diary below      Assessment: Tolerated treatment well. Patient demonstrated fatigue post treatment, exhibited good technique with therapeutic exercises, and would benefit from continued PT      Plan: Continue per plan of care.  Progress treatment as tolerated.       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   POC Expires:2024      Objective 24: RE 1/10 1/15 1/29 1/31 2/7 2/12 2/21 2/26 3/4/24   ABC    68.13%    62.5%    66.25%    5xSTS    10.55s    9.42s    9.21s    6MWT    947ft w SPC    1,299ft    1,032    FGA            Briseno                10mWT    0.70m/s    With SPC: 0.97m/s    Without AD: 0.99    With SPC: 1.07 m/s    Without  1.07m/s    Manuals                                                                                Neuro Re-Ed                Gait training in SOLO -SOLO treadmill no UE support 1.1mph incline 1.0% 3.5 minrounds with 5# AW     Side stepping 0.5-0.6mph close supervision -SOLO treadmill no UE support 1.2mph incline 1.0% 4.min rounds with 5# AW    Side stepping 0.5-0.6 mph close supervision, 3x30s intervals, close supervision -SOLO treadmill no UE support 1.2mph incline 1.0% 4 min round with 5# AW    -SOLO treadmill No UE support 2min 1.6mph 3x    Side stepping 0.5-0.6mph close supervision 3x30\" intervals close " supervision  -SOLO treadmll no UE support 1.7mph incline 1.0% 5# AW 4 rounds    -SOLO treadmill no UE support side stepping 0.9 mph 2x1 min intervals bilaterally close supervision -Solo treadmill no UE support 1.7mph incline 2.0% 5# 2 min rounds 4x increased LBP.    -SOLO treadmill no UE support 1.7mph incline 2.0% 7.5# 2 min intervals x2    -SOLO treadmill no UE support 1.8mph 0.5% incline 7.5# 2 min intervals x2       Hurdles    In SOLO 6 hurdles no AD 5lb AW highest setting sideways over foam beam 6x close supervision    In SOLO 6 hurdles no AD 7.5LB on L LE 5x leading with L LE 5x leading with R LE    In SOLO 6 hurdles no AD 10# AW on L LE holding bolster+3lb AW side stepping over, cues for control, 6x  In SOLO 6 hurdles no AD 10# AW on L LE holding bolster+3lb AW side stepping over, cues for control, 6x   Ankle strategy          Forward and backward EC on foam board holding bolster in both hands. 20x, intermittent modA, SOLO      Uneven surface gait       -Forward stepping on foam beam carrying bolster, 7.5lb AW bilaterally  -Forward stepping on foam beam carrying bolster 7.5lb AW bilaterally   -Forward stepping on foam beam carrying bolster + 3lb AW; 7.5lb AW bilaterally 8x in SOLO Forward stepping on foam beam carrying bolster 3lb added on bolster; 7.5lb AW vickey 8x in SOLO  Forward stepping on foam beam carrying bolster 3lb added on bolster; 10 lb AW vickey 8x in SOLO   Static balance      FT EC 4x 1 min          Tandem gait                Testing                Dynamic Balance     6x40ft backwards walking 10lb AW on L LE 3x, 10lb on R LE           Reactive strategy training       Walkign with pulling theraband to mimic dog walking, 30x  Walking with pulling t-band to mimic dog walking while carrying green bolster in L UE 8x20ft Walking with pulling t-band to mimic dog walking while carrying green bolster in L UE + 3lb AW 8x 20ft Walking with pulling t-band to mimic dog walking while carrying green bolster in L  UE + 3lb AW 8x 20ft     Resisted gait       15lb AW bilaterally on each LE 1 min speed rounds 4x.          Tests & measures    FGA, 6MWT, ABC, 10mWT, 5xSTS, ROS    FGA, 6MWT, ABC, 10mWT, 5xSTS, ROS, review of goals, review of progress made thus far, safety check after fall.     FGA, 6MWT, ABC, 10mWT, 5xSTS, ROS review of goals, review of progress made thus far.     Patient education                Step ups 2x10 bilaterally +5lb AW on L LE no AD forced use 2x10 bilaterally 5lb AW on L LE no AD forced use holding 7lb AW bilaterally.               Ther Ex                Split stance STS      3x10 biasing L LE 3x10 biasing L LE holding 10lb KB on L Side  3x10 biasing L LE + 10lb KB 3x10 biasing L LE + 10Lb KB   3x10 biasing L LE + 10Lb KB   LAQ      3x10 bilaterally 15lb AW          Heel Raises                Patient education                Sartorius sliders         3x10 bilaterally 3x10 bilaterally 3x10 bilaterally  3x10 b/l                                   Ther Activity                Skin inspection                Curb transfer                Stair navigation         4x up and down 12 steps 3# AW on L LE 4x up and down 12 steps 5# AW on L LE 4x up and down 12 steps 5# AW on L LE  4x up/down 12 steps 5# AW on LLE   Hula Hoop walk throughs         10x with large hula hoop 10x with small hula hoop no errors noted.        AD Training                Gait Training                                                Modalities

## 2024-03-06 ENCOUNTER — APPOINTMENT (OUTPATIENT)
Facility: CLINIC | Age: 54
End: 2024-03-06
Payer: COMMERCIAL

## 2024-03-12 ENCOUNTER — OFFICE VISIT (OUTPATIENT)
Facility: CLINIC | Age: 54
End: 2024-03-12
Payer: COMMERCIAL

## 2024-03-12 DIAGNOSIS — I63.9 CEREBROVASCULAR ACCIDENT (CVA), UNSPECIFIED MECHANISM (HCC): Primary | ICD-10-CM

## 2024-03-12 DIAGNOSIS — R26.2 DIFFICULTY WALKING: ICD-10-CM

## 2024-03-12 DIAGNOSIS — G03.9 MENINGITIS: ICD-10-CM

## 2024-03-12 PROCEDURE — 97140 MANUAL THERAPY 1/> REGIONS: CPT

## 2024-03-12 PROCEDURE — 97112 NEUROMUSCULAR REEDUCATION: CPT

## 2024-03-12 NOTE — PROGRESS NOTES
Daily Note     Today's date: 3/12/2024  Patient name: Seth Almazan  : 1970  MRN: [id number]  Referring provider: Stephanie Hurd P*  Dx:   Encounter Diagnosis     ICD-10-CM    1. Cerebrovascular accident (CVA), unspecified mechanism (HCC)  I63.9       2. Meningitis  G03.9       3. Difficulty walking  R26.2                      Subjective: Pt states that he has been having excruciating pain in the whole top of his left hip. He states it feels familiar to how it did when he first had his stroke. He notes that it is achy in nature.       Objective: See treatment diary below      Assessment: Pt participated in a skilled PT session focused on balance, gait, and strengthening. Manual intervention was performed to reduce left quadriceps discomfort. Visual inspection performed and no bruising or edema was observed. Due to discomfort, weight was held today for LE strengthening. Pt was advised to follow up with his physician regarding his quadriceps pain. Pt will continue to benefit from skilled PT so that he can navigate his environment with increased safety and stability.       Plan: Continue per plan of care.      Precautions: CVA, history of meningitis due to Klebsiella pneumoniae, HTN, Chiari Malformation Type 1 and repair, HCC, anxiety & depression, HA's, neurogenic syncope, Craniectomy suboccipital w/ cervical laminectomy/chiari, [place] Allergy, Medical tape Allergy, Lisinopril Allergy.   POC Expires:2024      Objective 24: RE 1/10 1/15 1/29 1/31 2/7 2/12 2/21 2/26 3/4/24 3/12   ABC    68.13%    62.5%    66.25%     5xSTS    10.55s    9.42s    9.21s     6MWT    947ft w SPC    1,299ft    1,032     FGA             Briseno                 10mWT    0.70m/s    With SPC: 0.97m/s    Without AD: 0.99    With SPC: 1.07 m/s    Without  1.07m/s     Manuals                                                                                     Neuro Re-Ed                 Gait  "training in SOLO -SOLO treadmill no UE support 1.1mph incline 1.0% 3.5 minrounds with 5# AW     Side stepping 0.5-0.6mph close supervision -SOLO treadmill no UE support 1.2mph incline 1.0% 4.min rounds with 5# AW    Side stepping 0.5-0.6 mph close supervision, 3x30s intervals, close supervision -SOLO treadmill no UE support 1.2mph incline 1.0% 4 min round with 5# AW    -SOLO treadmill No UE support 2min 1.6mph 3x    Side stepping 0.5-0.6mph close supervision 3x30\" intervals close supervision  -SOLO treadmll no UE support 1.7mph incline 1.0% 5# AW 4 rounds    -SOLO treadmill no UE support side stepping 0.9 mph 2x1 min intervals bilaterally close supervision -Solo treadmill no UE support 1.7mph incline 2.0% 5# 2 min rounds 4x increased LBP.    -SOLO treadmill no UE support 1.7mph incline 2.0% 7.5# 2 min intervals x2    -SOLO treadmill no UE support 1.8mph 0.5% incline 7.5# 2 min intervals x2        Hurdles    In SOLO 6 hurdles no AD 5lb AW highest setting sideways over foam beam 6x close supervision    In SOLO 6 hurdles no AD 7.5LB on L LE 5x leading with L LE 5x leading with R LE    In SOLO 6 hurdles no AD 10# AW on L LE holding bolster+3lb AW side stepping over, cues for control, 6x  In SOLO 6 hurdles no AD 10# AW on L LE holding bolster+3lb AW side stepping over, cues for control, 6x In SOLO 6 hurdles no AD no LE resistance side stepping over hurdles middle setting due to p!.    Ankle strategy          Forward and backward EC on foam board holding bolster in both hands. 20x, intermittent modA, SOLO       Uneven surface gait       -Forward stepping on foam beam carrying bolster, 7.5lb AW bilaterally  -Forward stepping on foam beam carrying bolster 7.5lb AW bilaterally   -Forward stepping on foam beam carrying bolster + 3lb AW; 7.5lb AW bilaterally 8x in SOLO Forward stepping on foam beam carrying bolster 3lb added on bolster; 7.5lb AW vickey 8x in SOLO  Forward stepping on foam beam carrying bolster 3lb added on " bolster; 10 lb AW vickey 8x in SOLO    Static balance      FT EC 4x 1 min        Foam cone tap 30x   Tandem gait              6x40ft   Testing                 Dynamic Balance     6x40ft backwards walking 10lb AW on L LE 3x, 10lb on R LE            Reactive strategy training       Walkign with pulling theraband to mimic dog walking, 30x  Walking with pulling t-band to mimic dog walking while carrying green bolster in L UE 8x20ft Walking with pulling t-band to mimic dog walking while carrying green bolster in L UE + 3lb AW 8x 20ft Walking with pulling t-band to mimic dog walking while carrying green bolster in L UE + 3lb AW 8x 20ft      Resisted gait       15lb AW bilaterally on each LE 1 min speed rounds 4x.           Tests & measures    FGA, 6MWT, ABC, 10mWT, 5xSTS, ROS    FGA, 6MWT, ABC, 10mWT, 5xSTS, ROS, review of goals, review of progress made thus far, safety check after fall.     FGA, 6MWT, ABC, 10mWT, 5xSTS, ROS review of goals, review of progress made thus far.      Patient education                 Step ups 2x10 bilaterally +5lb AW on L LE no AD forced use 2x10 bilaterally 5lb AW on L LE no AD forced use holding 7lb AW bilaterally.                Ther Ex                 Split stance STS      3x10 biasing L LE 3x10 biasing L LE holding 10lb KB on L Side  3x10 biasing L LE + 10lb KB 3x10 biasing L LE + 10Lb KB   3x10 biasing L LE + 10Lb KB    LAQ      3x10 bilaterally 15lb AW           Heel Raises                 Patient education                 Sartorius sliders         3x10 bilaterally 3x10 bilaterally 3x10 bilaterally  3x10 b/l                                      Ther Activity                 Skin inspection                 Curb transfer                 Stair navigation         4x up and down 12 steps 3# AW on L LE 4x up and down 12 steps 5# AW on L LE 4x up and down 12 steps 5# AW on L LE  4x up/down 12 steps 5# AW on LLE    Hula Hoop walk throughs         10x with large hula hoop 10x with small hula hoop  no errors noted.         AD Training                 Gait Training                                                   Manual Therapy                               STM: quadriceps, rectus femoris. 15 min L LE

## 2024-03-15 ENCOUNTER — APPOINTMENT (OUTPATIENT)
Facility: CLINIC | Age: 54
End: 2024-03-15
Payer: COMMERCIAL

## 2024-03-18 ENCOUNTER — OFFICE VISIT (OUTPATIENT)
Facility: CLINIC | Age: 54
End: 2024-03-18
Payer: COMMERCIAL

## 2024-03-18 DIAGNOSIS — I63.9 CEREBROVASCULAR ACCIDENT (CVA), UNSPECIFIED MECHANISM (HCC): Primary | ICD-10-CM

## 2024-03-18 DIAGNOSIS — R26.2 DIFFICULTY WALKING: ICD-10-CM

## 2024-03-18 DIAGNOSIS — G03.9 MENINGITIS: ICD-10-CM

## 2024-03-18 PROCEDURE — 97110 THERAPEUTIC EXERCISES: CPT | Performed by: PHYSICAL THERAPY ASSISTANT

## 2024-03-18 PROCEDURE — 97112 NEUROMUSCULAR REEDUCATION: CPT | Performed by: PHYSICAL THERAPY ASSISTANT

## 2024-03-18 NOTE — PROGRESS NOTES
Daily Note     Today's date: 3/18/2024  Patient name: Seth Almazan  : 1970  MRN: [id number]  Referring provider: Stephanie Hurd P*  Dx:   Encounter Diagnosis     ICD-10-CM    1. Cerebrovascular accident (CVA), unspecified mechanism (HCC)  I63.9       2. Meningitis  G03.9       3. Difficulty walking  R26.2                      Subjective: Pt states that he has been having excruciating pain in the whole top of his left hip. He states it feels familiar to how it did when he first had his stroke. He notes that it is achy in nature.       Objective: See treatment diary below      Assessment: Pt participated in a skilled PT session focused on balance, gait, and strengthening. Manual intervention was performed to reduce left quadriceps discomfort. Visual inspection performed and no bruising or edema was observed. Due to discomfort, weight was held today for LE strengthening. Pt was advised to follow up with his physician regarding his quadriceps pain. Pt will continue to benefit from skilled PT so that he can navigate his environment with increased safety and stability.       Plan: Continue per plan of care.      Precautions: CVA, history of meningitis due to Klebsiella pneumoniae, HTN, Chiari Malformation Type 1 and repair, HCC, anxiety & depression, HA's, neurogenic syncope, Craniectomy suboccipital w/ cervical laminectomy/chiari, [place] Allergy, Medical tape Allergy, Lisinopril Allergy.   POC Expires:2024      Objective 1/31 2/7 2/12 2/21 2/26 3/4/24 3/12 3/14   ABC 62.5%    66.25%      5xSTS 9.42s    9.21s      6MWT 1,299ft    1,032      FGA       Briseno           10mWT With SPC: 0.97m/s    Without AD: 0.99    With SPC: 1.07 m/s    Without  1.07m/s      Manuals                                                       Neuro Re-Ed           Gait training in SOLO  -SOLO treadmill no UE support 1.7mph incline 2.0% 7.5# 2 min intervals x2    -SOLO treadmill no UE support 1.8mph 0.5% incline  7.5# 2 min intervals x2         Hurdles     In SOLO 6 hurdles no AD 10# AW on L LE holding bolster+3lb AW side stepping over, cues for control, 6x  In SOLO 6 hurdles no AD 10# AW on L LE holding bolster+3lb AW side stepping over, cues for control, 6x In SOLO 6 hurdles no AD no LE resistance side stepping over hurdles middle setting due to p!.  In SOLO 5 hurdles (high)no AD no LE weights fwd and sidestepping 3 laps each     Ankle strategy   Forward and backward EC on foam board holding bolster in both hands. 20x, intermittent modA, SOLO        Uneven surface gait  -Forward stepping on foam beam carrying bolster 7.5lb AW bilaterally   -Forward stepping on foam beam carrying bolster + 3lb AW; 7.5lb AW bilaterally 8x in SOLO Forward stepping on foam beam carrying bolster 3lb added on bolster; 7.5lb AW vickey 8x in SOLO  Forward stepping on foam beam carrying bolster 3lb added on bolster; 10 lb AW vickey 8x in SOLO     Static balance       Foam cone tap 30x Foam cone taps x30   Tandem gait       6x40ft 6x40'   Testing           Dynamic Balance           Reactive strategy training  Walking with pulling t-band to mimic dog walking while carrying green bolster in L UE 8x20ft Walking with pulling t-band to mimic dog walking while carrying green bolster in L UE + 3lb AW 8x 20ft Walking with pulling t-band to mimic dog walking while carrying green bolster in L UE + 3lb AW 8x 20ft       Resisted gait           Tests & measures FGA, 6MWT, ABC, 10mWT, 5xSTS, ROS, review of goals, review of progress made thus far, safety check after fall.     FGA, 6MWT, ABC, 10mWT, 5xSTS, ROS review of goals, review of progress made thus far.       Patient education           Step ups           Ther Ex           Split stance STS  3x10 biasing L LE + 10lb KB 3x10 biasing L LE + 10Lb KB   3x10 biasing L LE + 10Lb KB  3x10 biasing LLE +10# KB   LAQ           Heel Raises           Patient education           Sartorius sliders  3x10 bilaterally 3x10  bilaterally 3x10 bilaterally  3x10 b/l  3x10 b/l                         Ther Activity           Skin inspection           Curb transfer           Stair navigation  4x up and down 12 steps 3# AW on L LE 4x up and down 12 steps 5# AW on L LE 4x up and down 12 steps 5# AW on L LE  4x up/down 12 steps 5# AW on LLE  4x up/down 12 steps  5# AW on LLE   Hula Hoop walk throughs  10x with large hula hoop 10x with small hula hoop no errors noted.          AD Training           Gait Training                                 Manual Therapy                  STM: quadriceps, rectus femoris. 15 min L LE

## 2024-03-21 ENCOUNTER — APPOINTMENT (OUTPATIENT)
Facility: CLINIC | Age: 54
End: 2024-03-21
Payer: COMMERCIAL

## 2024-03-23 DIAGNOSIS — F43.23 SITUATIONAL MIXED ANXIETY AND DEPRESSIVE DISORDER: ICD-10-CM

## 2024-03-25 ENCOUNTER — EVALUATION (OUTPATIENT)
Facility: CLINIC | Age: 54
End: 2024-03-25
Payer: COMMERCIAL

## 2024-03-25 DIAGNOSIS — R26.2 DIFFICULTY WALKING: ICD-10-CM

## 2024-03-25 DIAGNOSIS — I63.9 CEREBROVASCULAR ACCIDENT (CVA), UNSPECIFIED MECHANISM (HCC): Primary | ICD-10-CM

## 2024-03-25 DIAGNOSIS — G03.9 MENINGITIS: ICD-10-CM

## 2024-03-25 PROCEDURE — 97112 NEUROMUSCULAR REEDUCATION: CPT

## 2024-03-25 RX ORDER — HYDROXYZINE HYDROCHLORIDE 25 MG/1
TABLET, FILM COATED ORAL
Qty: 30 TABLET | Refills: 0 | Status: SHIPPED | OUTPATIENT
Start: 2024-03-25

## 2024-03-25 NOTE — PROGRESS NOTES
PT Re-Evaluation     Today's date: 3/25/2024  Patient name: Seth Almazan  : 1970  MRN: [id number]  Referring provider: Stephanie Hurd P*  Dx:   Encounter Diagnosis     ICD-10-CM    1. Cerebrovascular accident (CVA), unspecified mechanism (HCC)  I63.9       2. Meningitis  G03.9       3. Difficulty walking  R26.2                              Assessment  Assessment details: 3/25/2024: Pt is a 53 y.o. male who has received 28 visits of skilled PT intervention focused on balance, gait, and strengthening to address deficits secondary to CVA & meningitis due to chiari malformation repair surgery. Today's assessment reflects minimal change since his previous assessment, which is expected as he has only received 4 visits of skilled PT intervention from his prior assessment. His FGA still places him at risk of falls, and gait speed reflects limitations that would impact his safety when crossing the street. Pt also continues to exhibit reduced balance confidence as indicated by ABC. Pt continues to report limitation with stair navigation, which is essential for him as he lives in an apartment building, as well as walking endurance. His 6MWT score also continues to reflect CV deficits. Pt was given an updated HEP to carry over benefits of skilled PT. Ongoing skilled PT intervention is essential so that pt can navigate his environment with increased safety and stability.     2024: Pt is a 53 y.o male who has received skilled PT intervention focused on balance, gait, and strengthening to address deficits s/p CVA & meningitis due to chiari malformation repair surgery. While last reassessment was only 4 visits ago, pt exhibited improvement in FGA, ABC, and 5xSTS scores compared to last assessment. Pt's gait speed improved to 1.06m/s classifying him as a community ambulator, however score reflects that it is still unsafe for him to cross the street. 6MWT was performed and was lower today as compared to  previous visits, however, pt is still recovering from respiratory illness and he feels that this impacted his performance. He continues to note difficulty with donning and doffing clothing, and has multiple instances of loss of balance when walking his dog. Pt will continue to benefit from skilled PT intervention focused on balance, gait, and strengthening  so that he can return to his job without limitation.     1/31/2024: Pt is a 53 y.o. male who has received skilled PT intervention focused on balance, gait, and strengthening intervention to address deficits s/p CVA & meningitis due to chiari malformation repair surgery. Today's assessment reflects significant improvement in pt's CV endurance and gait tolerance as noted by improvement in 6MWT to >1000ft, which is a significant improvement from previous assessment. Furthermore, pt's 5xSTS and gait speed also significantly improved with and without AD. Pt's current gait speed with and without AD now classifies him as a community ambulator. Despite these improvements, pt's 6MWT is still significantly below aged matched normative values, and is completed with an AD. Pt's goal is to ambulate without an AD. Furthermore, FGA & ABC scores place him at risk for falls. Pt is at a higher risk of injury from falls due to craniectomy without replacement of bone. This, coupled with the fact that pt had a fall yesterday, indicates that it is essential for the pt to receive ongoing physical therapy intervention to address balance deficits. Most noteably, pt notes the greatest deficits with stair navigation, donning and doffing pants & socks, and has ongoing concerns about his left leg buckling. Pt is very hardworking and completes all exercises as directed. Ongoing physical therapy intervention is essential for him to return to work, complete dressing tasks without adaptive equipment, and navigate his environment without increased risk of falls.      1/9/2024: Pt is a 53 y.o.  male who has received skilled PT intervention focused on balance, gait, and strengthening intervention to address deficits s/p CVA & meningitis due to chiari malformation repair surgery. Today's assessment reflects significant improvement in pt's dynamic balance, LE power, and gait speed as compared to previous visit. 6MWT score was reduced, however, pt was utilizing a less supportive device during today's session as compared to previous session (using an SPC today and a QPC previously). FGA was performed without AD and pt exhibited improvement compared to his last assessment with AD use.  Despite these improvements, pt still suffers from significant LBP, which impacts his ability to ambulate long distances. Furthermore, 6MWT score continues to reflect significant CV deficits, and FGA places him at risk for falls. Gait speed places him at a limited community ambulator. Pt will therefore continue to benefit from skilled PT intervention focused on balance, gait, and strengthening so that he can navigate his environment with increased safety and stability.     12/4/2023: Pt is a 53 y.o. male who has received 16 visits of skilled PT intervention focused on balance, gait, and strengthening intervention. Today's assessment reflects statistically significant improvement in LE power, CV fitness, and balance as indicated by improvement in 5xSTS, 6MWT, and GARZA score. FGA added today to assess pt's dynamic balance and pt scored 15/30, reflecting ongoing increased risk of falls. Balance confidence also still reflects increased risk of falls as noted by ABC <67%. Pt notes ongoing difficulty with L LE strength and concerns surrounding buckling, so planning to focus on unilateral strengthening to aid in improving L LE strength and power. Descending stairs also continues to be a challenge as noted by reduced eccentric control lowering with the L LE, and requires ongoing PT so he can navigate the stairs in his home. Pt will  continue to benefit from skilled PT intervention focused on balance, gait, and strengthening so that he can navigate his environment safely and with increased stability.     11/29/23: Pt is a 53 y.o. male who has received 15 visits of skilled PT intervention focused on balance, gait, and strengthening intervention. Today's visit reflects significant improvement in pt's gait and independence with ambulation. He is now able to utilize the cane full time. However, pt's gait still reflects decreased stance time on the left LE and reduced swing on the left, which increases his risk of falls as he occasionally catches his foot. Pt will continue to require ongoing PT intervention so that he can navigate his environment with increased safety and stability.     11/2/23: Pt is a 53 y.o. male who has received 9 visits of skilled PT intervention focused on balance, gait, and strengthening intervention. Today's assessment reflects significant improvement in gait speed and 5xSTS scores, reflecting improvement in LE power. Furthermore, 2MWT w RW was progressed to 6MWT with SPC, reflecting improvements in endurance. GARZA increased from 36 to 46, reflecting improved balance. Despite this, pt still scores at risk for falls and is a limited community ambulator. Pt is limited in his progression by his LBP around his shunt, which he is trying to get addressed by his neurosurgeon, however has been unsuccessful in getting a response from him. Pt will continue to benefit from skilled PT intervention focused on balance, gait, and strengthening intervention so that he can navigate his environment safely and without pain.     At IE: Pt is a 53 y.o. 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 Today's assessment reflects elevated falls risk and reduced CV stamina as indicated by 2MWT and ABC scoring. 5xSTS and MMTs reflect  reduced LE strength and power, which contribute to pt's feeling of weakness and difficulty navigating stairs. Pt also exhibited coordination impairments which impacts his ability to correct sequence gait. IE examination limited today due to time constraints, but plan to complete additional testing at next visit. Pt will benefit from skilled physical therapy intervention addressing strength, balance, and gait deficits so that she may be able to navigate his environment safely and with increased independence.   Impairments: abnormal coordination, abnormal gait, abnormal muscle firing, abnormal muscle tone, abnormal or restricted ROM, abnormal movement, activity intolerance, difficulty understanding, impaired balance, impaired physical strength, lacks appropriate home exercise program, safety issue, poor posture  and poor body mechanics  Functional limitations: gait, balance, transfers.Barriers to therapy: Insurance limitations  Understanding of Dx/Px/POC: good   Prognosis: fair  Prognosis details: Pt is exceptionally hard working and has a good home support system. While his low back pain does impair his prognosis, he is still within the timeline for maximal neuroplasticity for recovering from a neurological event, and stands to make further improvement. Not participating in physical therapy services would be detrimental to his safety and functioning.     Goals  STG: To be achieved within 4 weeks  1. Pt to be independent with HEP to maximize carry over skilled PT intervention at home ONGOING  2. Pt to improve 5xSTS score by 2.3s to reflect improvement in hip extensor strength and improve STS transfers MET  3. Pt to improve gait speed by MCID stroke .14s so to reflect improvement in gait speed. MET  4. Pt to improve hip flexor, hip abductor, and hip extensor strength to 4/5 MMT to improve recruitment of hip strategy ONGOING  5. Pt to improve ankle DF strength to 4/5 MMT to reduce instances of foot drag in gait  ONGOING  6. Pt to exhibit proper sequencing with use of AD to improve ambulation and stability during gait MET  7. Pt to 2MWT by MDC of 40ft to reflect improvement in CV endurance.  MET    LT weeks  1. Pt to improve FGA score to >22/30 to reflect reduced risk of falls ONGOING  2. Pt to improve gait speed to within age matched normative values 1.39m/s at self selected speed to improve safety when crossing the street ONGOING  3. Pt to improve hip flexor, abductor, and extensor MMT to 5/5 to aid in proper recruitment of hip strategy during gait ONGOING  4. Pt to improve ankle DF MMT to 5/5 to reduce instances of foot drag during gait. ONGOING  5. Pt to improve endurance so that 6MWT can be assessed.  MET  6. Pt will be able to improve safety to begin initiation of gait training with SPC.  MET    New Goals 2024:   1. Pt will report 10/10 confidence with stair navigation ascending and descending with a hand rail in 8 weeks: ONGOING  2. Pt will improve 6MWT score by stroke MCID of 112.86ft to reflect improvement in CV endurance.  ONGOING  3. Pt will improve Self Selected gait speed to >1.2m/s with AD so that he can safely cross a street: ONGOING  4. Pt will report improvement in confidence with donning his pants by 50% so that he can don and doff clothing more safely. ONGOING    All data taken from [facility] Neuro Section or Rehab Measures, UDPT Normative Values sheet    GARZA test: 46/56                                                    5 x STS Test:  MDC: 6 points                                                                       MDC: 2.3 seconds   age norms                                                                           Age Norms   60-69 year old = M: 55, F: 55                                             60-69 year old: 11.4 seconds   70-79 year old = M 54,  F: 53                                             70-79 year old: 12.6 seconds    80-89 year old = M53,   F: 50                                              80-89 year old: 14.8 seconds     TUG test:                                                                     10 Meter Walk Test:  MDC: 4.14 seconds                                                                MDC: .59 ft/sec  Cut off score for Falls                                                  Age Norms  > 13.5 seconds community dwelling adults                20-29; M: 4.56 ft/sec F: 4.62 ft/sec  > 32.2 Frail Elderly                                                     30-39: M 4.76 ft/sec  F: 4.68 ft/sec                                                                                                     40-49: M: 4.79 ft/sec  F: 4.62 ft/sec  6 Minute Walk Test                                                              50-59: M: 4.76 ft/sec  F: 4.56 ft/sec  MDC: 190 feet                                                                        60-69: M: 4.56 ft/sec  F: 4.26 ft/sec  Age Norms                                                                              70-+    M: 4.36 ft/sec  F: 4.16 ft/sec  60-69:    M: 1876 F: 1765  70-79:    M: 1729 F: 1545  80-89 +: M: 1368 F; 1286              4 square step test          Age matched Norms:          -Acute stroke: 20.8 seconds- 17.5 seconds          -Older adults/geriatrics: 32.6 seconds (multiple fallers)/17.6 seconds (non-fallers)          -Parkinson's: On Drug time: 9.6 secs/ Off Drug time: 11 .02secs         MCID: Not established         MDC: Not established         Cut off scores (for falls risk)          -Older adults/Geriatric: > 15 seconds          -Vestibular: > 12 seconds          -Transtibial amputations: >24 seconds at risk for falls          -Acute stroke: failed attempt or > 15 seconds          -Parkinson's disease: < 9.68 seconds      FGA  Age matched Norms  -40-49 years= 28.9/ 50-59 years=28.4  -60-69 years=27.1/70-79 years=24.9  -80/89 years=20.8  MCID: Vestibular disorders: 8 points  MDC: Parkinsons: 0.61, Stroke: 4.2  points  Cut-offs:  -Community dwelling older adults   -22/30 : predict falls (Sensitivity 85%, Specificity 86%)   -20/30 (unexplained falls in the next 6 months) (Sensitivity 100%, Specificity 76%)  -Parkinson's   -5/30 (identify fallers in Parkinson's)        Plan  Plan details: Therapeutic activities: to address functional deficits by training transfers, gait, and tasks essential for daily functioning  Therapeutic exercise: address strength, ROM, and tissue length deficits   Neuromuscular Reeducation: addresses balance deficits, vestibular impairments, gaze stabilization, oculomotor impairments, coordination, & PNF  Manual Intervention: address joint mobility deficits, soft tissue restrictions, and pain relieving soft tissue mobilization & IASTM provided by the physical therapist.   Patient would benefit from: skilled physical therapy  Planned modality interventions: electrical stimulation/Russian stimulation, thermotherapy: hydrocollator packs and cryotherapy  Planned therapy interventions: abdominal trunk stabilization, activity modification, balance, balance/weight bearing training, body mechanics training, flexibility, functional ROM exercises, gait training, graded activity, graded exercise, home exercise program, IADL retraining, neuromuscular re-education, motor coordination training, joint mobilization, manual therapy, orthotic fitting/training, orthotic management and training, patient education, postural training, strengthening, stretching, therapeutic activities, therapeutic exercise, therapeutic training, transfer training, wheelchair management and work reintegration  Frequency: 2x week  Duration in visits: 16  Duration in weeks: 8  Plan of Care beginning date: 2/26/2024  Plan of Care expiration date: 4/22/2024  Treatment plan discussed with: patient, caregiver and family    Subjective Evaluation    History of Present Illness  Date of onset: 7/17/2023  Date of surgery: 1/3/2023  Mechanism of injury:  "3/25/2024: Pt states that he is doing okay today. He still feels unsteady on his feet. He has been making a significant effort to get out and walk at least a block a day. He is still limited by LBP and hip pain.     2/26/2024: Pt states that is still recovering from his respiratory illness and continues to have a cough and chest congestion as well as increased fatigue. He notes that he feels he is still at risk of falling and is very worried that he may not ever be able to go back to work due to the numbness in his legs and its impact on his balance. He notes that he really wants to go back to work, he loves his job. He still feels unsteady navigating stairs and \"loses power\" in his legs very quickly when walking long distances.     1/31/2024: Pt states that he feels 70% of the way to where he would like to be. He feels limited by left leg control and stair navigation. He can walk around his house without the cane, but cannot do long distances or shopping without the cane. The numbness in his left foot is getting a lot better. His right leg feels more numb than it has been and his LBP has been worse. His is still unable to dress without adaptive equipment, including a grabber tool and a sock aid. Donning pants and socks in particular are the most difficult. Pt cannot tie his shoes. He fell yesterday on the stairs, he went to sit down and he lost control of the railing. He tipped backwards and hit the back of his head where he does not have his skull. He was lightheaded in the moment and has some tenderness today, but otherwise he is doing okay. He also reports ongoing endurance deficits. He is unable to do the dishes without taking a rest break.     1/9/2024: Pt presents with SPC today, he is no longer using his quad cane. Pt states that he feels approximately 70% of the way to where he would like to be. He is limited by still feeling a bit unstable. He states that his balance is not there quite yet.     12/4/2023: " "Pt states that he feels about 60-70% of the way to where he expects to be when he is done with PT. He states that he feels pretty steady doing that. Stairs have gotten much easier and he feels he can fly up and down them at this point. He still doesn't feel secure when he walks and he still feels his left leg will get out at any time. He can finally move his toes and now he is very excited about that. He doesn't report any other situations where he feels really off balance. His next step is to stand up in the shower. \"If I can do that I'll be fine.\"     11/29/23:Pt states he and his surgeon are very impressed by the progress he is making. He states that he still struggles with curb navigation and vehicle navigation (getting into and out of his car). He is now using the quad cane primarily except for when his back is really bad.     11/2/23: Pt states that he feels about 50% of the way to where he would like to be. He states that he can do anything at his house for himself except for putting the seat in the shower. Specifically it is challenging picking it up and carrying it to the bathroom. Pt states that he feels the most unsteady walking with the cane because he has not done a lot of it, but would like to progress to the cane.     At IE: Pt states that he underwent a surgery for chiari malformation on 1/3/2023 when the surgeon accidentally cut his membrane and caused a CSF leak. He then went on to have 15 additional surgeries and two shunts placed, one in his head and one in his back. 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 He was admitted to the hospital on July 10th 2023 due to complications with the shunts. When he was in the hospital, he suffered two strokes on July 17th. Testing revealed meningitis and ventriculitis, which they feel caused the strokes. He had both a left sided and a right sided stroke. Initially, he was placed on life support due to the " "severity of the CVAs. However, he improved quickly. He was unable to sit up initially and had no mobility on his left side. He had inpatient rehab for 22 days. Now, he is using a rollator for short distances, and is able to shower himself with a shower chair and mobility aid to lift his LE. Pt notes that at this time it is still hard for him to  his left foot. He notes a little numbness in his right calf. He has had two falls since the CVA. One occurred in the inpatient facility while using the bedside urinal, and the other one occurred in his home when his RW got stuck in the bathroom. His daughter, Melissa, has since moved home with home and is his primary caretaker at this time. She supplied a portion of the subjective today. She supervises him on stairs and brings a WC in case of longer distance travels. 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 He would also like to be able to ambulate without an RW, and be able to walk for longer without limitation. They need to leave a couple minutes before 9:30am today.           Not a recurrent problem   Quality of life: fair    Patient Goals  Patient goals for therapy: increased strength, independence with ADLs/IADLs, return to sport/leisure activities, return to work, improved balance and increased motion  Patient goal: to go back to work, to have more power in his legs, do dishes without taking breaks.  Pain  At best pain ratin  At worst pain ratin  Location: left leg, back.  Quality: sharp, burning, dull ache and knife-like  Relieving factors: change in position and medications  Aggravating factors: sitting  Progression: improved    Social Support  Steps to enter house: no  Stairs in house: yes   6  Lives in: multiple-level home  Lives with: adult " children    Employment status: not working  Hand dominance: right    Treatments  Discharged from (in last 30 days): home health care      Objective     Strength/Myotome Testing     Left Hip   Planes of Motion   Flexion: 3+    Right Hip   Planes of Motion   Flexion: 4    Left Knee   Extension: 2+    Right Knee   Extension: 3+    Left Ankle/Foot   Dorsiflexion: 2+    Right Ankle/Foot   Dorsiflexion: 3+  Neuro Exam:     Coordination   Heel to shin: right WNL  Heel to shin: left dysmetric  Finger to nose: left WNL and right WNL  Rapid alternating movements: UE impaired and LE impaired              Precautions: CVA, history of meningitis due to Klebsiella pneumoniae, HTN, Chiari Malformation Type 1 and repair, HCC, anxiety & depression, HA's, neurogenic syncope, Craniectomy suboccipital w/ cervical laminectomy/chiari, [place] [facility], Medical tape Allergy, Lisinopril Allergy.   POC Expires:04/22/2024      Objective 1/31 2/7 2/12 2/21 2/26 3/4/24 3/12 3/14 3/25   ABC 62.5%    66.25%    67.5%   5xSTS 9.42s    9.21s    9.38s   6MWT 1,299ft    1,032    1,138ft   FGA 19/30 20/30 20/30   [place]            10mWT With SPC: 0.97m/s    Without AD: 0.99    With SPC: 1.07 m/s    Without  1.07m/s    Without SPC SS: 1.08m/s    With SPC Fast: 1.09m/s       Manuals                                                            Neuro Re-Ed            Gait training in SOLO  -SOLO treadmill no UE support 1.7mph incline 2.0% 7.5# 2 min intervals x2    -SOLO treadmill no UE support 1.8mph 0.5% incline 7.5# 2 min intervals x2          Hurdles     In SOLO 6 hurdles no AD 10# AW on L LE holding bolster+3lb AW side stepping over, cues for control, 6x  In SOLO 6 hurdles no AD 10# AW on L LE holding bolster+3lb AW side stepping over, cues for control, 6x In SOLO 6 hurdles no AD no LE resistance side stepping over hurdles middle setting due to p!.  In SOLO 5 hurdles (high)no AD no LE weights fwd and sidestepping 3 laps each      Ankle strategy    Forward and backward EC on foam board holding bolster in both hands. 20x, intermittent modA, SOLO         Uneven surface gait  -Forward stepping on foam beam carrying bolster 7.5lb AW bilaterally   -Forward stepping on foam beam carrying bolster + 3lb AW; 7.5lb AW bilaterally 8x in SOLO Forward stepping on foam beam carrying bolster 3lb added on bolster; 7.5lb AW vickey 8x in SOLO  Forward stepping on foam beam carrying bolster 3lb added on bolster; 10 lb AW vickey 8x in SOLO      Static balance       Foam cone tap 30x Foam cone taps x30    Tandem gait       6x40ft 6x40'    Testing            Dynamic Balance            Reactive strategy training  Walking with pulling t-band to mimic dog walking while carrying green bolster in L UE 8x20ft Walking with pulling t-band to mimic dog walking while carrying green bolster in L UE + 3lb AW 8x 20ft Walking with pulling t-band to mimic dog walking while carrying green bolster in L UE + 3lb AW 8x 20ft        Resisted gait            Tests & measures FGA, 6MWT, ABC, 10mWT, 5xSTS, ROS, review of goals, review of progress made thus far, safety check after fall.     FGA, 6MWT, ABC, 10mWT, 5xSTS, ROS review of goals, review of progress made thus far.     FGA, 6MWT, ABC, 10mWT< 5xSTS, ROS review of goals, review of progress made thus far. Review of HEP   Patient education            Step ups            Ther Ex            Split stance STS  3x10 biasing L LE + 10lb KB 3x10 biasing L LE + 10Lb KB   3x10 biasing L LE + 10Lb KB  3x10 biasing LLE +10# KB    LAQ            Heel Raises            Patient education            Sartorius sliders  3x10 bilaterally 3x10 bilaterally 3x10 bilaterally  3x10 b/l  3x10 b/l                            Ther Activity            Skin inspection            Curb transfer            Stair navigation  4x up and down 12 steps 3# AW on L LE 4x up and down 12 steps 5# AW on L LE 4x up and down 12 steps 5# AW on L LE  4x up/down 12 steps 5# AW on LLE  4x up/down 12  steps  5# AW on LLE    Hula Hoop walk throughs  10x with large hula hoop 10x with small hula hoop no errors noted.           AD Training            Gait Training                                    Manual Therapy                   STM: quadriceps, rectus femoris. 15 min L LE

## 2024-03-27 ENCOUNTER — APPOINTMENT (OUTPATIENT)
Facility: CLINIC | Age: 54
End: 2024-03-27
Payer: COMMERCIAL

## 2024-04-03 DIAGNOSIS — I10 ESSENTIAL HYPERTENSION: ICD-10-CM

## 2024-04-03 RX ORDER — DOXAZOSIN MESYLATE 4 MG/1
4 TABLET ORAL
Qty: 90 TABLET | Refills: 1 | Status: SHIPPED | OUTPATIENT
Start: 2024-04-03

## 2024-04-23 ENCOUNTER — OFFICE VISIT (OUTPATIENT)
Dept: FAMILY MEDICINE CLINIC | Facility: CLINIC | Age: 54
End: 2024-04-23
Payer: COMMERCIAL

## 2024-04-23 ENCOUNTER — HOSPITAL ENCOUNTER (OUTPATIENT)
Dept: NON INVASIVE DIAGNOSTICS | Age: 54
Discharge: HOME | End: 2024-04-23
Payer: COMMERCIAL

## 2024-04-23 VITALS
HEART RATE: 91 BPM | TEMPERATURE: 98.2 F | RESPIRATION RATE: 16 BRPM | OXYGEN SATURATION: 95 % | DIASTOLIC BLOOD PRESSURE: 74 MMHG | BODY MASS INDEX: 46.68 KG/M2 | WEIGHT: 308 LBS | HEIGHT: 68 IN | SYSTOLIC BLOOD PRESSURE: 118 MMHG

## 2024-04-23 DIAGNOSIS — E66.01 MORBID OBESITY WITH BMI OF 40.0-44.9, ADULT (HCC): ICD-10-CM

## 2024-04-23 DIAGNOSIS — Z86.73 HISTORY OF ISCHEMIC STROKE: ICD-10-CM

## 2024-04-23 DIAGNOSIS — M79.89 LEG SWELLING: ICD-10-CM

## 2024-04-23 DIAGNOSIS — R69 TAKING MEDICATION FOR CHRONIC DISEASE: ICD-10-CM

## 2024-04-23 DIAGNOSIS — F43.23 SITUATIONAL MIXED ANXIETY AND DEPRESSIVE DISORDER: ICD-10-CM

## 2024-04-23 DIAGNOSIS — I10 ESSENTIAL HYPERTENSION: Primary | ICD-10-CM

## 2024-04-23 PROBLEM — G00.8: Status: RESOLVED | Noted: 2023-07-29 | Resolved: 2024-04-23

## 2024-04-23 PROBLEM — G96.00 CSF LEAK: Status: RESOLVED | Noted: 2023-01-29 | Resolved: 2024-04-23

## 2024-04-23 PROBLEM — G04.90 VENTRICULITIS OF BRAIN DUE TO BACTERIA: Status: RESOLVED | Noted: 2023-09-01 | Resolved: 2024-04-23

## 2024-04-23 PROBLEM — B96.89 VENTRICULITIS OF BRAIN DUE TO BACTERIA: Status: RESOLVED | Noted: 2023-09-01 | Resolved: 2024-04-23

## 2024-04-23 PROCEDURE — 99214 OFFICE O/P EST MOD 30 MIN: CPT | Performed by: PHYSICIAN ASSISTANT

## 2024-04-23 PROCEDURE — 93970 EXTREMITY STUDY: CPT | Performed by: SURGERY

## 2024-04-23 PROCEDURE — 93970 EXTREMITY STUDY: CPT

## 2024-04-23 RX ORDER — HYDROXYZINE HYDROCHLORIDE 25 MG/1
TABLET, FILM COATED ORAL
Qty: 30 TABLET | Refills: 5 | Status: SHIPPED | OUTPATIENT
Start: 2024-04-23

## 2024-04-23 NOTE — PROGRESS NOTES
Assessment/Plan:    B/l leg swelling - most likely due to 40+ lb weight gain and sedentary lifestyle, will do stat doppler both legs, labs. Should start compression stockings and encouraged to try joining a gym for water walking or stationary bike to work on weight loss    2.  HTN - well controlled on losartan 25 mg, amlodipine, cardura    3. Ischemic stroke - on Lipitor 40 mg, completed PT, has not followed up with neuro    4. Morbid obesity - needs to work on weight loss    5. Low back pain - s/p laminectomy, increase pain due to weight gain, encouraged to work on weight loss and movement, consider Dr Tate for future     F/u 3 months or sooner if needed    Subjective:   Chief Complaint   Patient presents with    Leg Swelling     Swelling in both legs  Now with rash on both feet   Worse over the last month      Patient ID: Seth Almazan is a 53 y.o. male.    Patient here complaining of both legs swelling for months, stable, no change. Both legs numb from stroke and recent back surgery in 2023 at [facility]. Completed PT for stroke but still limited and does not get much exercise. Has gained 40-50 lbs recently, very sedentary. Legs are swollen all day, does not change in morning or night.     Has not seen neurosurgeon since post op visit in January, cannot return to work yet due to left leg mobility.  Refusing to see surgeon as advised regarding low back pain, refusing all surgeons. Chronic daily low back pain.     Has been compliant with all medications.        The following portions of the patient's history were reviewed and updated as appropriate: allergies, current medications, past family history, past medical history, past social history, past surgical history, and problem list.    Past Medical History:   Diagnosis Date    Head ache     Hypertension     Stroke (HCC)      Past Surgical History:   Procedure Laterality Date    CARPAL TUNNEL RELEASE Left     CRANIECTOMY SUBOCCIPITAL W/ CERVICAL LAMINECTOMY / [facility]I        Family History   Problem Relation Age of Onset    No Known Problems Mother     No Known Problems Father     Cancer Maternal Grandmother     Cancer Maternal Grandfather     Substance Abuse Brother     Substance Abuse Brother     Alcohol abuse Neg Hx     Mental illness Neg Hx      Social History     Socioeconomic History    Marital status: Single     Spouse name: Not on file    Number of children: Not on file    Years of education: Not on file    Highest education level: Not on file   Occupational History    Not on file   Tobacco Use    Smoking status: Every Day     Current packs/day: 0.50     Types: Cigarettes    Smokeless tobacco: Never   Vaping Use    Vaping status: Never Used   Substance and Sexual Activity    Alcohol use: Not Currently     Comment: Everyday     Drug use: No    Sexual activity: Yes     Partners: Female   Other Topics Concern    Not on file   Social History Narrative    Not on file     Social Determinants of Health     Financial Resource Strain: Low Risk  (9/1/2023)    Received from [facility]    Overall Financial Resource Strain (CARDIA)     Difficulty of Paying Living Expenses: Not hard at all   Food Insecurity: No Food Insecurity (9/1/2023)    Received from [facility]    Hunger Vital Sign     Worried About Running Out of Food in the Last Year: Never true     Ran Out of Food in the Last Year: Never true   Transportation Needs: No Transportation Needs (9/4/2023)    Received from [facility]    PRAPARE - Transportation     Lack of Transportation (Medical): No     Lack of Transportation (Non-Medical): No   Physical Activity: Not on file   Stress: Not on file   Social Connections: Feeling Socially Integrated (9/23/2023)    Received from [facility]    OASIS : Social Isolation     How often do you feel lonely or isolated from those around you?: Never   Intimate Partner Violence: Not At Risk (9/1/2023)    Received from  [facility]    Humiliation, Afraid, Rape, and Kick questionnaire     Fear of Current or Ex-Partner: No     Emotionally Abused: No     Physically Abused: No     Sexually Abused: No   Housing Stability: Low Risk  (9/1/2023)    Received from [facility]    Housing Stability Vital Sign     Unable to Pay for Housing in the Last Year: No     Number of Places Lived in the Last Year: 1     Unstable Housing in the Last Year: No       Current Outpatient Medications:     acetaminophen (TYLENOL) 500 mg tablet, Take 500 mg by mouth every 4 (four) hours as needed, Disp: , Rfl:     amLODIPine (NORVASC) 10 mg tablet, Take 1 tablet (10 mg total) by mouth daily, Disp: 90 tablet, Rfl: 1    atorvastatin (LIPITOR) 40 mg tablet, Take 1 tablet (40 mg total) by mouth daily, Disp: 90 tablet, Rfl: 1    Cyanocobalamin 1000 MCG SUBL, Place 1 tablet (1,000 mcg total) under the tongue daily, Disp: 90 tablet, Rfl: 0    doxazosin (CARDURA) 4 mg tablet, TAKE 1 TABLET BY MOUTH DAILY AT BEDTIME, Disp: 90 tablet, Rfl: 1    ferrous sulfate 325 (65 Fe) mg tablet, Take 1 tablet (325 mg total) by mouth every other day, Disp: 90 tablet, Rfl: 1    hydrOXYzine HCL (ATARAX) 25 mg tablet, TAKE 1 TABLET BY MOUTH EVERYDAY AT BEDTIME, Disp: 30 tablet, Rfl: 5    losartan (COZAAR) 25 mg tablet, TAKE 1 TABLET (25 MG TOTAL) BY MOUTH DAILY., Disp: 90 tablet, Rfl: 1    mometasone (ELOCON) 0.1 % cream, Apply topically daily (Patient taking differently: Apply topically as needed), Disp: 45 g, Rfl: 3    multivitamin (THERAGRAN) TABS, Take 1 tablet by mouth daily, Disp: , Rfl:     Polyethylene Glycol 3350 POWD, Take 17 g by mouth daily, Disp: , Rfl:     pregabalin (LYRICA) 150 mg capsule, Take 1 capsule (150 mg total) by mouth 2 (two) times a day, Disp: 60 capsule, Rfl: 0    sertraline (ZOLOFT) 50 mg tablet, Take 1 tablet (50 mg total) by mouth daily, Disp: 90 tablet, Rfl: 3    methocarbamol (ROBAXIN) 500 mg tablet, Take 500 mg by mouth 4  "(four) times a day as needed (Patient not taking: Reported on 4/23/2024), Disp: , Rfl:     oxyCODONE (ROXICODONE) 5 immediate release tablet, TAKE 1 TABLET BY MOUTH EVERY 6 HOURS AS NEEDED FOR MODERATE PAIN (SCALE 4-6/10) FOR UP TO 10 DAYS. (Patient not taking: Reported on 10/10/2023), Disp: , Rfl:     oxyCODONE-acetaminophen (PERCOCET) 5-325 mg per tablet, , Disp: , Rfl:     Current Facility-Administered Medications:     cyanocobalamin injection 1,000 mcg, 1,000 mcg, Intramuscular, Q30 Days, Polly Patten MD, 1,000 mcg at 09/14/20 0841    Review of Systems          Objective:    Vitals:    04/23/24 1145   BP: 118/74   Pulse: 91   Resp: 16   Temp: 98.2 °F (36.8 °C)   TempSrc: Temporal   SpO2: 95%   Weight: (!) 140 kg (308 lb)   Height: 5' 8\" (1.727 m)        Physical Exam  Constitutional:       Appearance: Normal appearance. He is well-developed. He is obese.   HENT:      Head: Normocephalic and atraumatic.   Cardiovascular:      Rate and Rhythm: Normal rate and regular rhythm.      Pulses: Normal pulses.      Heart sounds: Normal heart sounds.   Pulmonary:      Effort: Pulmonary effort is normal.      Breath sounds: Normal breath sounds.   Musculoskeletal:         General: Normal range of motion.      Cervical back: Normal range of motion and neck supple.      Right lower leg: Edema present.      Left lower leg: Edema present.      Comments: B/l +1 edema, venous stasis dermatitis, pulses +2. Hohmans negative   Skin:     General: Skin is warm.   Neurological:      General: No focal deficit present.      Mental Status: He is alert and oriented to person, place, and time.   Psychiatric:         Mood and Affect: Mood normal.         Behavior: Behavior normal.         Thought Content: Thought content normal.         Judgment: Judgment normal.           Depression Screening Follow-up Plan: Patient's depression screening was positive with a PHQ-2 score of . Their PHQ-9 score was 8. Patient assessed for underlying " major depression. They have no active suicidal ideations. Brief counseling provided and recommend additional follow-up/re-evaluation next office visit.

## 2024-04-24 ENCOUNTER — TELEPHONE (OUTPATIENT)
Age: 54
End: 2024-04-24

## 2024-04-24 NOTE — TELEPHONE ENCOUNTER
Patient contacted the office this afternoon after OV yesterday with PCP - states he forgot to ask provider for information on obtaining a handicap placard. Patient is unsure if he has to apply for this through the DMV or if the provider/office would provide assistance with this process. Requesting call back with more information.

## 2024-06-11 DIAGNOSIS — I63.9 ACUTE ISCHEMIC STROKE (HCC): ICD-10-CM

## 2024-06-11 DIAGNOSIS — I10 ESSENTIAL HYPERTENSION: ICD-10-CM

## 2024-06-11 RX ORDER — AMLODIPINE BESYLATE 10 MG/1
10 TABLET ORAL DAILY
Qty: 90 TABLET | Refills: 1 | Status: SHIPPED | OUTPATIENT
Start: 2024-06-11

## 2024-06-11 RX ORDER — ATORVASTATIN CALCIUM 40 MG/1
40 TABLET, FILM COATED ORAL DAILY
Qty: 90 TABLET | Refills: 1 | Status: SHIPPED | OUTPATIENT
Start: 2024-06-11

## 2024-06-11 NOTE — TELEPHONE ENCOUNTER
Reason for call:   [x] Refill   [] Prior Auth  [] Other:     Office:   [x] PCP/Provider - Gayatri Hurd  [] Specialty/Provider -     Medication: Amlodipine   Dose/Frequency: 10 mg Daily   Quantity: 90      Medication: Atorvastatin  Dose/Frequency: 40 mg Daily   Quantity: 90      Pharmacy: [facility] [place] [address]    Does the patient have enough for 3 days?   [] Yes   [x] No - Send as HP to POD

## 2024-07-03 DIAGNOSIS — I10 ESSENTIAL HYPERTENSION: ICD-10-CM

## 2024-07-03 RX ORDER — DOXAZOSIN 4 MG/1
4 TABLET ORAL
Qty: 90 TABLET | Refills: 0 | OUTPATIENT
Start: 2024-07-03

## 2024-07-03 NOTE — TELEPHONE ENCOUNTER
Reason for call:   [x] Refill   [] Prior Auth  [] Other:     Office:   [x] PCP/Provider -   [] Specialty/Provider -     Medication: doxazosin (CARDURA) 4 mg tablet     Dose/Frequency:  TAKE 1 TABLET BY MOUTH DAILY AT BEDTIME     Quantity: 90 tablet     Pharmacy: [facility]/pharmacy #[id number] [address] [phone]     Does the patient have enough for 3 days?   [] Yes   [x] No - Send as HP to POD

## 2024-09-14 DIAGNOSIS — D64.9 ANEMIA, UNSPECIFIED TYPE: ICD-10-CM

## 2024-09-16 RX ORDER — FERROUS SULFATE 325(65) MG
1 TABLET ORAL EVERY OTHER DAY
Qty: 90 TABLET | Refills: 1 | Status: SHIPPED | OUTPATIENT
Start: 2024-09-16

## 2024-09-24 DIAGNOSIS — I10 ESSENTIAL HYPERTENSION: ICD-10-CM

## 2024-09-24 DIAGNOSIS — F43.23 SITUATIONAL MIXED ANXIETY AND DEPRESSIVE DISORDER: ICD-10-CM

## 2024-09-24 RX ORDER — LOSARTAN POTASSIUM 25 MG/1
25 TABLET ORAL DAILY
Qty: 60 TABLET | Refills: 0 | Status: SHIPPED | OUTPATIENT
Start: 2024-09-24

## 2024-10-12 DIAGNOSIS — F43.23 SITUATIONAL MIXED ANXIETY AND DEPRESSIVE DISORDER: ICD-10-CM

## 2024-10-12 RX ORDER — HYDROXYZINE HYDROCHLORIDE 25 MG/1
TABLET, FILM COATED ORAL
Qty: 30 TABLET | Refills: 5 | Status: SHIPPED | OUTPATIENT
Start: 2024-10-12

## 2024-10-14 ENCOUNTER — TELEPHONE (OUTPATIENT)
Age: 54
End: 2024-10-14

## 2024-10-14 NOTE — TELEPHONE ENCOUNTER
Huey from [facility] calling to see if office received medical records request faxed on 10/7. I advised her we did not receive any faxes/requests. Huey was confirming office information and then connection was disrupted.

## 2024-11-20 DIAGNOSIS — I10 ESSENTIAL HYPERTENSION: ICD-10-CM

## 2024-11-20 RX ORDER — LOSARTAN POTASSIUM 25 MG/1
25 TABLET ORAL DAILY
Qty: 60 TABLET | Refills: 0 | Status: SHIPPED | OUTPATIENT
Start: 2024-11-20

## 2024-11-24 DIAGNOSIS — I10 ESSENTIAL HYPERTENSION: ICD-10-CM

## 2024-11-26 RX ORDER — DOXAZOSIN 4 MG/1
4 TABLET ORAL
Qty: 90 TABLET | Refills: 1 | Status: SHIPPED | OUTPATIENT
Start: 2024-11-26

## 2024-12-11 DIAGNOSIS — I63.9 ACUTE ISCHEMIC STROKE (HCC): ICD-10-CM

## 2024-12-12 RX ORDER — ATORVASTATIN CALCIUM 40 MG/1
40 TABLET, FILM COATED ORAL DAILY
Qty: 90 TABLET | Refills: 1 | Status: SHIPPED | OUTPATIENT
Start: 2024-12-12

## 2025-01-17 DIAGNOSIS — I10 ESSENTIAL HYPERTENSION: ICD-10-CM

## 2025-01-17 RX ORDER — LOSARTAN POTASSIUM 25 MG/1
25 TABLET ORAL DAILY
Qty: 60 TABLET | Refills: 0 | OUTPATIENT
Start: 2025-01-17

## 2025-03-19 DIAGNOSIS — F43.23 SITUATIONAL MIXED ANXIETY AND DEPRESSIVE DISORDER: ICD-10-CM
